# Patient Record
Sex: FEMALE | Race: BLACK OR AFRICAN AMERICAN | NOT HISPANIC OR LATINO | Employment: OTHER | URBAN - METROPOLITAN AREA
[De-identification: names, ages, dates, MRNs, and addresses within clinical notes are randomized per-mention and may not be internally consistent; named-entity substitution may affect disease eponyms.]

---

## 2018-01-18 RX ORDER — FENOFIBRATE 48 MG/1
48 TABLET, FILM COATED ORAL DAILY
COMMUNITY
Start: 2017-10-19 | End: 2018-04-17 | Stop reason: SDUPTHER

## 2018-01-18 RX ORDER — AMLODIPINE BESYLATE 5 MG/1
5 TABLET ORAL DAILY
COMMUNITY
Start: 2017-12-02 | End: 2018-04-17 | Stop reason: SDUPTHER

## 2018-01-18 RX ORDER — CARVEDILOL 3.12 MG/1
3.12 TABLET ORAL DAILY
COMMUNITY
Start: 2017-12-02

## 2018-01-18 RX ORDER — ATORVASTATIN CALCIUM 20 MG/1
20 TABLET, FILM COATED ORAL DAILY
COMMUNITY
Start: 2017-12-02 | End: 2018-04-17 | Stop reason: SDUPTHER

## 2018-01-18 RX ORDER — CARVEDILOL 6.25 MG/1
6.25 TABLET ORAL DAILY
COMMUNITY
Start: 2017-12-02 | End: 2018-04-17 | Stop reason: SDUPTHER

## 2018-01-18 RX ORDER — LISINOPRIL AND HYDROCHLOROTHIAZIDE 12.5; 2 MG/1; MG/1
1 TABLET ORAL DAILY
COMMUNITY
Start: 2017-12-02 | End: 2018-04-17 | Stop reason: SDUPTHER

## 2018-04-17 ENCOUNTER — OFFICE VISIT (OUTPATIENT)
Dept: GASTROENTEROLOGY | Facility: CLINIC | Age: 53
End: 2018-04-17
Payer: MEDICARE

## 2018-04-17 VITALS
HEIGHT: 64 IN | BODY MASS INDEX: 20.93 KG/M2 | RESPIRATION RATE: 16 BRPM | SYSTOLIC BLOOD PRESSURE: 148 MMHG | DIASTOLIC BLOOD PRESSURE: 88 MMHG | WEIGHT: 122.63 LBS | HEART RATE: 84 BPM | TEMPERATURE: 98 F

## 2018-04-17 DIAGNOSIS — K70.30 ALCOHOLIC CIRRHOSIS OF LIVER WITHOUT ASCITES: Primary | ICD-10-CM

## 2018-04-17 DIAGNOSIS — M54.40 ACUTE RIGHT-SIDED LOW BACK PAIN WITH SCIATICA, SCIATICA LATERALITY UNSPECIFIED: ICD-10-CM

## 2018-04-17 DIAGNOSIS — I10 ESSENTIAL HYPERTENSION: ICD-10-CM

## 2018-04-17 DIAGNOSIS — K85.20 ALCOHOL-INDUCED ACUTE PANCREATITIS, UNSPECIFIED COMPLICATION STATUS: ICD-10-CM

## 2018-04-17 DIAGNOSIS — K25.9 GASTRIC ULCER WITHOUT HEMORRHAGE OR PERFORATION, UNSPECIFIED CHRONICITY: ICD-10-CM

## 2018-04-17 PROCEDURE — 99215 OFFICE O/P EST HI 40 MIN: CPT | Mod: ,,, | Performed by: INTERNAL MEDICINE

## 2018-04-17 RX ORDER — POTASSIUM CHLORIDE 20 MEQ/1
1 TABLET, EXTENDED RELEASE ORAL DAILY
COMMUNITY
Start: 2018-03-04 | End: 2018-04-17 | Stop reason: SDUPTHER

## 2018-04-17 RX ORDER — LISINOPRIL AND HYDROCHLOROTHIAZIDE 12.5; 2 MG/1; MG/1
1 TABLET ORAL DAILY
Qty: 90 TABLET | Refills: 0 | Status: SHIPPED | OUTPATIENT
Start: 2018-04-17 | End: 2019-08-15

## 2018-04-17 RX ORDER — FENOFIBRATE 48 MG/1
48 TABLET, FILM COATED ORAL DAILY
Qty: 90 TABLET | Refills: 0 | Status: SHIPPED | OUTPATIENT
Start: 2018-04-17

## 2018-04-17 RX ORDER — CARVEDILOL 6.25 MG/1
6.25 TABLET ORAL DAILY
Qty: 90 TABLET | Refills: 0 | Status: SHIPPED | OUTPATIENT
Start: 2018-04-17 | End: 2018-12-27 | Stop reason: SDUPTHER

## 2018-04-17 RX ORDER — ATORVASTATIN CALCIUM 20 MG/1
20 TABLET, FILM COATED ORAL DAILY
Qty: 90 TABLET | Refills: 0 | Status: SHIPPED | OUTPATIENT
Start: 2018-04-17 | End: 2018-12-27 | Stop reason: SDUPTHER

## 2018-04-17 RX ORDER — LANOLIN ALCOHOL/MO/W.PET/CERES
1 CREAM (GRAM) TOPICAL DAILY
Refills: 3 | COMMUNITY
Start: 2018-03-04

## 2018-04-17 RX ORDER — POTASSIUM CHLORIDE 20 MEQ/1
20 TABLET, EXTENDED RELEASE ORAL DAILY
Qty: 90 TABLET | Refills: 0 | Status: SHIPPED | OUTPATIENT
Start: 2018-04-17 | End: 2019-08-15

## 2018-04-17 RX ORDER — AMLODIPINE BESYLATE 5 MG/1
5 TABLET ORAL DAILY
Qty: 90 TABLET | Refills: 0 | Status: SHIPPED | OUTPATIENT
Start: 2018-04-17

## 2018-04-23 ENCOUNTER — TELEPHONE (OUTPATIENT)
Dept: GASTROENTEROLOGY | Facility: CLINIC | Age: 53
End: 2018-04-23

## 2018-04-23 NOTE — TELEPHONE ENCOUNTER
Patient called to speak with Dr. Vanessa to have a form filled out to get a handicap sticker because of her hip and back. Please call patient.

## 2018-06-17 NOTE — PROGRESS NOTES
[facility] Established Patient Visit    Subjective:           PCP: Chrissy Vanessa    Chief Complaint: Follow-up and Medication Refill       HPI:  Fariba Gamez is a 52 y.o. female here for follow up and medication refill. Patient has hx of cirrhosis and continues to drink alcohol. Patient has hx of pancreatitis, and htn.       ROS:   Constitutional: No fevers, chills, weight loss  ENT: No allergies, sore throat, rhinorrhea  CV: No chest pain, palpitations, edema  Pulm: No cough, shortness of breath, wheezing  Ophtho: No vision changes  GI: No blood in stools, change in bowel habits, nausea/vomiting  Denies alternating diarrhea/constipation.   Derm: No rash  Heme: No easy bruising or lymphadenopathy  MSK: No arthralgias or myalgias  : No dysuria, hematuria, frequency, polyuria, or flank tenderness  Endo: No hot or cold intolerance  Neuro: No syncope or seizure, or dizziness  Psych: No hallucination, depression or suicidal ideation      Medical History:  has a past medical history of Alcohol induced acute pancreatitis; Alcoholic cirrhosis of liver without ascites; Alcoholic liver disease; Gastric ulcer, unspecified as acute or chronic, without hemorrhage or perforation; Hyperlipidemia; Hypertension; Left-sided low back pain with left-sided sciatica; Pancreatitis; Stomach ulcer; and Unspecified cirrhosis of liver.      Surgical History:  has no past surgical history on file.    Family History:   Family History   Problem Relation Age of Onset    Hypertension Mother     Kidney failure Mother     Hypertension Father     Heart attack Father        Social History:   Social History   Substance Use Topics    Smoking status: Never Smoker    Smokeless tobacco: Never Used    Alcohol use Yes      Comment: socially       The patient's social and family histories were reviewed by me and updated in the appropriate section of the electronic medical record.    Allergies: Review of patient's allergies  "indicates:  No Known Allergies      Medications:   Current Outpatient Prescriptions   Medication Sig Dispense Refill    amLODIPine (NORVASC) 5 MG tablet Take 1 tablet (5 mg total) by mouth Daily. 90 tablet 0    atorvastatin (LIPITOR) 20 MG tablet Take 1 tablet (20 mg total) by mouth Daily. 90 tablet 0    carvedilol (COREG) 3.125 MG tablet Take 3.125 mg by mouth Daily.      carvedilol (COREG) 6.25 MG tablet Take 1 tablet (6.25 mg total) by mouth Daily. 90 tablet 0    fenofibrate (TRICOR) 48 MG tablet Take 1 tablet (48 mg total) by mouth Daily. 90 tablet 0    lisinopril-hydrochlorothiazide (PRINZIDE,ZESTORETIC) 20-12.5 mg per tablet Take 1 tablet by mouth Daily. 90 tablet 0    magnesium oxide (MAG-OX) 400 mg tablet Take 1 tablet by mouth once daily.  3    potassium chloride SA (K-DUR,KLOR-CON) 20 MEQ tablet Take 1 tablet (20 mEq total) by mouth once daily. 90 tablet 0     No current facility-administered medications for this visit.      All medications and past history have been reviewed.        Objective:        Vital Signs:  Blood pressure (!) 148/88, pulse 84, temperature 97.9 °F (36.6 °C), resp. rate 16, height 5' 4" (1.626 m), weight 55.6 kg (122 lb 9.6 oz). Body mass index is 21.04 kg/m².    Physical Exam:   General Appearance: Well appearing in no acute distress, well developed, well                nourished  Head: Normocephalic, without obvious abnormality  Eyes:  Pupils equal, round and reactive to light  Throat: Lips, mucosa, and tongue normal; teeth and gums normal  Lungs: CTA bilaterally in anterior and posterior fields, no wheezes, no crackles  Heart:  Regular rate and rhythm, no murmurs heard  Abdomen: Soft, non tender, non distended with positive bowel sounds in all four quadrants. No hepatosplenomegaly, ascites, or mass. Negative for succusion splash  : female   Extremities: No cyanosis, edema or deformity  Skin: No rash  Neurologic: Normal exam    Labs:  Lab Results   Component Value Date    " WBC 5.90 04/09/2010    HGB 9.1 (L) 04/09/2010    HCT 28.8 (L) 04/09/2010     04/09/2010    ALT 12 04/09/2010    AST 22 04/09/2010     04/09/2010    K 3.3 (L) 04/09/2010     04/09/2010    CREATININE 0.7 04/09/2010    BUN 7 04/09/2010    CO2 19 (L) 04/09/2010       Imaging:     All lab results and imaging results have been reviewed and discussed with the patient      Assessment:       1. Alcoholic cirrhosis of liver without ascites    2. Alcohol-induced acute pancreatitis, unspecified complication status    3. Gastric ulcer without hemorrhage or perforation, unspecified chronicity    4. Essential hypertension    5. Acute right-sided low back pain with sciatica, sciatica laterality unspecified        Plan:       Fariba was seen today for follow-up and medication refill.    Diagnoses and all orders for this visit:    Alcoholic cirrhosis of liver without ascites  -     potassium chloride SA (K-DUR,KLOR-CON) 20 MEQ tablet; Take 1 tablet (20 mEq total) by mouth once daily.  -     lisinopril-hydrochlorothiazide (PRINZIDE,ZESTORETIC) 20-12.5 mg per tablet; Take 1 tablet by mouth Daily.  -     fenofibrate (TRICOR) 48 MG tablet; Take 1 tablet (48 mg total) by mouth Daily.  -     carvedilol (COREG) 6.25 MG tablet; Take 1 tablet (6.25 mg total) by mouth Daily.  -     atorvastatin (LIPITOR) 20 MG tablet; Take 1 tablet (20 mg total) by mouth Daily.  -     amLODIPine (NORVASC) 5 MG tablet; Take 1 tablet (5 mg total) by mouth Daily.    Alcohol-induced acute pancreatitis, unspecified complication status  -     potassium chloride SA (K-DUR,KLOR-CON) 20 MEQ tablet; Take 1 tablet (20 mEq total) by mouth once daily.  -     lisinopril-hydrochlorothiazide (PRINZIDE,ZESTORETIC) 20-12.5 mg per tablet; Take 1 tablet by mouth Daily.  -     fenofibrate (TRICOR) 48 MG tablet; Take 1 tablet (48 mg total) by mouth Daily.  -     carvedilol (COREG) 6.25 MG tablet; Take 1 tablet (6.25 mg total) by mouth Daily.  -     atorvastatin  (LIPITOR) 20 MG tablet; Take 1 tablet (20 mg total) by mouth Daily.  -     amLODIPine (NORVASC) 5 MG tablet; Take 1 tablet (5 mg total) by mouth Daily.    Gastric ulcer without hemorrhage or perforation, unspecified chronicity  -     potassium chloride SA (K-DUR,KLOR-CON) 20 MEQ tablet; Take 1 tablet (20 mEq total) by mouth once daily.  -     lisinopril-hydrochlorothiazide (PRINZIDE,ZESTORETIC) 20-12.5 mg per tablet; Take 1 tablet by mouth Daily.  -     fenofibrate (TRICOR) 48 MG tablet; Take 1 tablet (48 mg total) by mouth Daily.  -     carvedilol (COREG) 6.25 MG tablet; Take 1 tablet (6.25 mg total) by mouth Daily.  -     atorvastatin (LIPITOR) 20 MG tablet; Take 1 tablet (20 mg total) by mouth Daily.  -     amLODIPine (NORVASC) 5 MG tablet; Take 1 tablet (5 mg total) by mouth Daily.    Essential hypertension  -     potassium chloride SA (K-DUR,KLOR-CON) 20 MEQ tablet; Take 1 tablet (20 mEq total) by mouth once daily.  -     lisinopril-hydrochlorothiazide (PRINZIDE,ZESTORETIC) 20-12.5 mg per tablet; Take 1 tablet by mouth Daily.  -     fenofibrate (TRICOR) 48 MG tablet; Take 1 tablet (48 mg total) by mouth Daily.  -     carvedilol (COREG) 6.25 MG tablet; Take 1 tablet (6.25 mg total) by mouth Daily.  -     atorvastatin (LIPITOR) 20 MG tablet; Take 1 tablet (20 mg total) by mouth Daily.  -     amLODIPine (NORVASC) 5 MG tablet; Take 1 tablet (5 mg total) by mouth Daily.    Acute right-sided low back pain with sciatica, sciatica laterality unspecified  -     potassium chloride SA (K-DUR,KLOR-CON) 20 MEQ tablet; Take 1 tablet (20 mEq total) by mouth once daily.  -     lisinopril-hydrochlorothiazide (PRINZIDE,ZESTORETIC) 20-12.5 mg per tablet; Take 1 tablet by mouth Daily.  -     fenofibrate (TRICOR) 48 MG tablet; Take 1 tablet (48 mg total) by mouth Daily.  -     carvedilol (COREG) 6.25 MG tablet; Take 1 tablet (6.25 mg total) by mouth Daily.  -     atorvastatin (LIPITOR) 20 MG tablet; Take 1 tablet (20 mg total) by  mouth Daily.  -     amLODIPine (NORVASC) 5 MG tablet; Take 1 tablet (5 mg total) by mouth Daily.        See HPI    Follow-up in about 3 months (around 7/17/2018).    The plan was discussed with the patient and all questions/concerns have been answered to the patient's satisfaction.        Electronically signed by Chrissy Vanessa MD    This note was dictated using voice recognition software and may contain grammatical errors.

## 2018-07-25 LAB
CANCER AG125 SERPL-ACNC: 7.2 U/ML (ref 0–38.1)
CANCER AG19-9 SERPL-ACNC: 67 U/ML (ref 0–35)

## 2018-07-27 LAB
ALBUMIN SERPL-MCNC: 3.2 G/DL (ref 3.1–4.7)
ALP SERPL-CCNC: 46 IU/L (ref 40–104)
ALT (SGPT): 10 IU/L (ref 3–33)
AMYLASE SERPL-CCNC: 54 U/L (ref 28–100)
APTT PPP: 32.6 SEC (ref 21.7–37.8)
AST SERPL-CCNC: 16 IU/L (ref 10–40)
BILIRUB SERPL-MCNC: 0.8 MG/DL (ref 0.3–1)
BUN SERPL-MCNC: <5 MG/DL (ref 8–20)
CALCIUM SERPL-MCNC: 8.9 MG/DL (ref 7.7–10.4)
CHLORIDE: 105 MMOL/L (ref 98–110)
CO2 SERPL-SCNC: 21.8 MMOL/L (ref 22.8–31.6)
CREATININE: 0.48 MG/DL (ref 0.6–1.4)
GLUCOSE: 87 MG/DL (ref 70–99)
HCT VFR BLD AUTO: 28 % (ref 36–48)
HGB BLD-MCNC: 10 G/DL (ref 12–15)
INR PPP: 1.1
MAGNESIUM SERPL-MCNC: 1.3 MG/DL (ref 1.5–2.6)
MCH RBC QN AUTO: 34.8 PG (ref 25–35)
MCHC RBC AUTO-ENTMCNC: 35.7 G/DL (ref 31–36)
MCV RBC AUTO: 97.6 FL (ref 79–98)
NUCLEATED RBCS: 0 %
PLATELET # BLD AUTO: 145 K/UL (ref 140–440)
POTASSIUM SERPL-SCNC: 3.6 MMOL/L (ref 3.5–5)
PROT SERPL-MCNC: 6 G/DL (ref 6–8.2)
PROTHROMBIN TIME: 14.4 SEC (ref 11.3–15.2)
RBC # BLD AUTO: 2.87 M/UL (ref 3.5–5.5)
SODIUM: 136 MMOL/L (ref 134–144)
WBC # BLD AUTO: 4.5 K/UL (ref 5–10)

## 2018-07-28 LAB
ALBUMIN SERPL-MCNC: 3.1 G/DL (ref 3.1–4.7)
ALP SERPL-CCNC: 42 IU/L (ref 40–104)
ALT (SGPT): 10 IU/L (ref 3–33)
AMYLASE SERPL-CCNC: 46 U/L (ref 28–100)
AST SERPL-CCNC: 13 IU/L (ref 10–40)
BILIRUB SERPL-MCNC: 0.9 MG/DL (ref 0.3–1)
BUN SERPL-MCNC: <5 MG/DL (ref 8–20)
CALCIUM SERPL-MCNC: 8.4 MG/DL (ref 7.7–10.4)
CHLORIDE: 104 MMOL/L (ref 98–110)
CO2 SERPL-SCNC: 21.6 MMOL/L (ref 22.8–31.6)
CREATININE: 0.51 MG/DL (ref 0.6–1.4)
GLUCOSE: 101 MG/DL (ref 70–99)
HCT VFR BLD AUTO: 26.1 % (ref 36–48)
HGB BLD-MCNC: 9.6 G/DL (ref 12–15)
LIPASE SERPL-CCNC: 63 U/L (ref 0–38)
MAGNESIUM SERPL-MCNC: 2 MG/DL (ref 1.5–2.6)
MCH RBC QN AUTO: 35.6 PG (ref 25–35)
MCHC RBC AUTO-ENTMCNC: 36.8 G/DL (ref 31–36)
MCV RBC AUTO: 96.7 FL (ref 79–98)
NUCLEATED RBCS: 0 %
PHOSPHATE FLD-MCNC: 3.5 MG/DL (ref 2.5–4.9)
PLATELET # BLD AUTO: 135 K/UL (ref 140–440)
POTASSIUM SERPL-SCNC: 3.2 MMOL/L (ref 3.5–5)
PROT SERPL-MCNC: 5.5 G/DL (ref 6–8.2)
RBC # BLD AUTO: 2.7 M/UL (ref 3.5–5.5)
SODIUM: 134 MMOL/L (ref 134–144)
WBC # BLD AUTO: 3.6 K/UL (ref 5–10)

## 2018-07-31 ENCOUNTER — TELEPHONE (OUTPATIENT)
Dept: GASTROENTEROLOGY | Facility: CLINIC | Age: 53
End: 2018-07-31

## 2018-07-31 NOTE — TELEPHONE ENCOUNTER
Patient has some question about pain meds and wants to know if she can start a eating mash potatoes. She was sent home from the hospital on clears.

## 2018-09-12 ENCOUNTER — OFFICE VISIT (OUTPATIENT)
Dept: HEMATOLOGY/ONCOLOGY | Facility: CLINIC | Age: 53
End: 2018-09-12
Payer: MEDICARE

## 2018-09-12 VITALS
HEART RATE: 75 BPM | BODY MASS INDEX: 21.2 KG/M2 | DIASTOLIC BLOOD PRESSURE: 82 MMHG | SYSTOLIC BLOOD PRESSURE: 124 MMHG | WEIGHT: 123.5 LBS | RESPIRATION RATE: 18 BRPM | TEMPERATURE: 99 F

## 2018-09-12 DIAGNOSIS — D69.6 THROMBOCYTOPENIA: ICD-10-CM

## 2018-09-12 DIAGNOSIS — K86.89 PANCREATIC MASS: ICD-10-CM

## 2018-09-12 DIAGNOSIS — D72.819 LEUKOPENIA, UNSPECIFIED TYPE: ICD-10-CM

## 2018-09-12 DIAGNOSIS — C22.7 OTHER SPECIFIED CARCINOMAS OF LIVER: ICD-10-CM

## 2018-09-12 DIAGNOSIS — D64.9 NORMOCHROMIC NORMOCYTIC ANEMIA: ICD-10-CM

## 2018-09-12 DIAGNOSIS — C25.0 MALIGNANT NEOPLASM OF HEAD OF PANCREAS: ICD-10-CM

## 2018-09-12 PROBLEM — C25.9 PANCREATIC CANCER: Status: ACTIVE | Noted: 2018-09-12

## 2018-09-12 PROCEDURE — 99204 OFFICE O/P NEW MOD 45 MIN: CPT | Mod: ,,, | Performed by: INTERNAL MEDICINE

## 2018-09-12 RX ORDER — SUCRALFATE 1 G/10ML
SUSPENSION ORAL
Refills: 0 | COMMUNITY
Start: 2018-07-28 | End: 2019-08-15

## 2018-09-12 RX ORDER — PANCRELIPASE 60000; 12000; 38000 [USP'U]/1; [USP'U]/1; [USP'U]/1
1 CAPSULE, DELAYED RELEASE PELLETS ORAL
Refills: 3 | COMMUNITY
Start: 2018-08-16

## 2018-09-12 RX ORDER — HYDRALAZINE HYDROCHLORIDE 50 MG/1
50 TABLET, FILM COATED ORAL EVERY 12 HOURS
Refills: 0 | COMMUNITY
Start: 2018-07-28

## 2018-09-12 RX ORDER — PANTOPRAZOLE SODIUM 40 MG/1
40 TABLET, DELAYED RELEASE ORAL DAILY
Refills: 0 | COMMUNITY
Start: 2018-07-28

## 2018-09-12 NOTE — LETTER
September 12, 2018      Arnel Bettencourt Jr., MD  [address] [place] [address]           [facility] - Hematology Oncology  [address]  [address]  [place] [address]  Phone: [phone]  Fax: [phone]          Patient: Fariba Gamez   MR Number: [id number]   YOB: 1965   Date of Visit: 9/12/2018       Dear Dr. Arnel Bettencourt Jr.:    Thank you for referring Fariba Gamez to me for evaluation. Attached you will find relevant portions of my assessment and plan of care.    If you have questions, please do not hesitate to call me. I look forward to following Fariba Gamez along with you.    Sincerely,    Manjinder Christy MD    Enclosure  CC:  No Recipients    If you would like to receive this communication electronically, please contact [email or web address] or [phone] to request more information on [employer] access.    For providers and/or their staff who would like to refer a patient to [facility], please contact us through our one-stop-shop provider referral line, [facility], at [phone].    If you feel you have received this communication in error or would no longer like to receive these types of communications, please e-mail [email or web address]

## 2018-09-12 NOTE — PROGRESS NOTES
"[facility] Hematolgy/Oncology  History & Physical    Subjective:      Patient ID:   NAME: Fariba Gamez : 1965     52 y.o. female    Referring Doc: Arnel Bettencourt Jr.,*  Other Physicians: Eugenie Vanessa        Chief Complaint: pancreatic mass    HPI:  52 y.o. female with diagnosis of pancreatic mass who has been referred by Arnel Bettencourt Jr.,* for evaluation by medical hematology/oncology. She is a patient of Dr Vanessa with GI. She has history of alcoholic liver disease, pancreatitis and cirrhosis. She had recent imaging which showed a mass in the head of her pancreas. She has not had any diagnostic biopsy or other tests done as of yet. She feels "good" and has not had any symptoms of discomfort. She breathing ok. She denies any CP, SOB, HA's or N/V. She has some occasional bilateral edema. She has lost weight but has since gained it back.               ROS:   GEN: normal without any fever, night sweats or weight loss  HEENT: normal with no HA's, sore throat, stiff neck, changes in vision  CV: normal with no CP, SOB, PND, MARTIN or orthopnea  PULM: normal with no SOB, cough, hemoptysis, sputum or pleuritic pain  GI: normal with no abdominal pain, nausea, vomiting, constipation, diarrhea, melanotic stools, BRBPR, or hematemesis  : normal with no hematuria, dysuria  BREAST: normal with no mass, discharge, pain  SKIN: normal with no rash, erythema, bruising, or swelling       Past Medical/Surgical History:  Past Medical History:   Diagnosis Date    Alcohol induced acute pancreatitis     Alcoholic cirrhosis of liver without ascites     Alcoholic liver disease     Gastric ulcer, unspecified as acute or chronic, without hemorrhage or perforation     Hyperlipidemia     Hypertension     Left-sided low back pain with left-sided sciatica     Leucopenia 2018    Normochromic normocytic anemia 2018    Pancreatic cancer 2018    Pancreatic mass 2018    Pancreatitis     Stomach " ulcer     Thrombocytopenia 9/12/2018    Unspecified cirrhosis of liver      History reviewed. No pertinent surgical history.      Allergies:  Review of patient's allergies indicates:  No Known Allergies    Social/Family History:  Social History     Socioeconomic History    Marital status: Single     Spouse name: Not on file    Number of children: Not on file    Years of education: Not on file    Highest education level: Not on file   Social Needs    Financial resource strain: Not on file    Food insecurity - worry: Not on file    Food insecurity - inability: Not on file    Transportation needs - medical: Not on file    Transportation needs - non-medical: Not on file   Occupational History    Not on file   Tobacco Use    Smoking status: Never Smoker    Smokeless tobacco: Never Used   Substance and Sexual Activity    Alcohol use: Yes     Comment: socially    Drug use: No    Sexual activity: Not on file   Other Topics Concern    Not on file   Social History Narrative    Not on file     Family History   Problem Relation Age of Onset    Hypertension Mother     Kidney failure Mother     Hypertension Father     Heart attack Father          Medications:    Current Outpatient Medications:     amLODIPine (NORVASC) 5 MG tablet, Take 1 tablet (5 mg total) by mouth Daily., Disp: 90 tablet, Rfl: 0    atorvastatin (LIPITOR) 20 MG tablet, Take 1 tablet (20 mg total) by mouth Daily., Disp: 90 tablet, Rfl: 0    CARAFATE 100 mg/mL suspension, TK 2 TEA PO BID, Disp: , Rfl: 0    carvedilol (COREG) 3.125 MG tablet, Take 3.125 mg by mouth Daily., Disp: , Rfl:     carvedilol (COREG) 6.25 MG tablet, Take 1 tablet (6.25 mg total) by mouth Daily., Disp: 90 tablet, Rfl: 0    CREON CpDR, TK 1 C PO TID WC, Disp: , Rfl: 3    fenofibrate (TRICOR) 48 MG tablet, Take 1 tablet (48 mg total) by mouth Daily., Disp: 90 tablet, Rfl: 0    hydrALAZINE (APRESOLINE) 50 MG tablet, TK 1 T PO Q 12 H, Disp: , Rfl: 0     lisinopril-hydrochlorothiazide (PRINZIDE,ZESTORETIC) 20-12.5 mg per tablet, Take 1 tablet by mouth Daily., Disp: 90 tablet, Rfl: 0    magnesium oxide (MAG-OX) 400 mg tablet, Take 1 tablet by mouth once daily., Disp: , Rfl: 3    pantoprazole (PROTONIX) 40 MG tablet, TK 1 T PO QD, Disp: , Rfl: 0    potassium chloride SA (K-DUR,KLOR-CON) 20 MEQ tablet, Take 1 tablet (20 mEq total) by mouth once daily., Disp: 90 tablet, Rfl: 0      Pathology:      EGD: 7/27/2018    FINAL DIAGNOSIS:  GASTRIC ANTRUM BIOPSY:    - MODERATE ACTIVE CHRONIC ANTRAL GASTRITIS WITH FOCAL FOLLICULAR      GASTRITIS FEATURES, HELICOBACTER NEGATIVE.    - NO INTESTINAL METAPLASIA, GLAND DYSPLASIA, OR MALIGNANCY SEEN.  Note: The patterns of follicular gastritis are not fully developed and  are only focally seen in one of the level sections.  The Giemsa control  slide is satisfactory.  DUODENUM BIOPSY:    - NO HISTOPATHOLOGIC DIAGNOSIS.  SPECIMEN:  ANTRUM DUODENUM  PRE/POST OPERATIVE DIAGNOSIS: LEFT SIDE ABDOMINAL PAIN / ESOPHAGITIS,  GASTRITIS, DUODENITIS      Objective:   Vitals:  Blood pressure 124/82, pulse 75, temperature 98.5 °F (36.9 °C), resp. rate 18, weight 56 kg (123 lb 8 oz).    Physical Examination:   GEN: no apparent distress, comfortable; AAOx3  HEAD: atraumatic and normocephalic  EYES: no pallor, no icterus, PERRLA  ENT: OMM, no pharyngeal erythema, external ears WNL; no nasal discharge; no thrush  NECK: no masses, thyroid normal, trachea midline, no LAD/LN's, supple  CV: RRR with no murmur; normal pulse; normal S1 and S2; mild pedal edema  CHEST: Normal respiratory effort; CTAB; normal breath sounds; no wheeze or crackles  ABDOM: nontender; soft; normal bowel sounds; no rebound/guarding; mildly distended  MUSC/Skeletal: ROM normal; no crepitus; joints normal; no deformities or arthropathy  EXTREM: no clubbing, cyanosis, inflammation or swelling; mild edema (pedal) bilat  SKIN: no rashes, lesions, ulcers, petechiae or subcutaneous  nodules  : no peña  NEURO: grossly intact; motor/sensory WNL; AAOx3; no tremors  PSYCH: normal mood, affect and behavior  LYMPH: normal cervical, supraclavicular, axillary and groin LN's      Labs:     8/5/2018 on chart      7/28/2018  Lab Results   Component Value Date    WBC 3.6 (L) 07/28/2018    HGB 9.6 (L) 07/28/2018    HCT 26.1 (L) 07/28/2018    MCV 96.7 07/28/2018     (L) 07/28/2018    CMP  Sodium   Date Value Ref Range Status   07/28/2018 134 134 - 144 mmol/L      Potassium   Date Value Ref Range Status   07/28/2018 3.2 (L) 3.5 - 5.0 mmol/L      Chloride   Date Value Ref Range Status   07/28/2018 104 98 - 110 mmol/L      CO2   Date Value Ref Range Status   07/28/2018 21.6 (L) 22.8 - 31.6 mmol/L      Glucose   Date Value Ref Range Status   07/28/2018 101 (H) 70 - 99 mg/dL      BUN, Bld   Date Value Ref Range Status   07/28/2018 <5 (L) 8 - 20 mg/dL      Creatinine   Date Value Ref Range Status   07/28/2018 0.51 (L) 0.60 - 1.40 mg/dL      Calcium   Date Value Ref Range Status   07/28/2018 8.4 7.7 - 10.4 mg/dL      Total Protein   Date Value Ref Range Status   07/28/2018 5.5 (L) 6.0 - 8.2 g/dL      Albumin   Date Value Ref Range Status   07/28/2018 3.1 3.1 - 4.7 g/dL      Total Bilirubin   Date Value Ref Range Status   07/28/2018 0.9 0.3 - 1.0 mg/dL      Alkaline Phosphatase   Date Value Ref Range Status   07/28/2018 42 40 - 104 IU/L      AST   Date Value Ref Range Status   07/28/2018 13 10 - 40 IU/L      ALT   Date Value Ref Range Status   04/09/2010 12 10 - 44 U/L Final     Anion Gap   Date Value Ref Range Status   04/09/2010 16 10 - 20 mmol/L Final     eGFR if    Date Value Ref Range Status   04/09/2010 >60 >60 mL/min Final     Comment:     Estimated glomerular filtration rate (eGFR) is normalized to an  average body surface area of 1.73 square meters.  The calculation  used to obtain the eGFR is the adjusted MDRD equation, which factors  patient sex, age, race, and creatinine result.   Since race is unknown  in our information system, the eGFR values for -American  and Non--American patients are given for each creatinine  result.       eGFR if non    Date Value Ref Range Status   04/09/2010 >60 >60 mL/min Final         Radiology/Diagnostic Studies:    Abdom US 8/5/2018:    Mass in head of pancreas 2.5 cm in size      All lab results and imaging results have been reviewed and discussed with the patient    Assessment:   (1) 52 y.o. female with diagnosis of pancreatic mass in head of pancrease seen on Ultrasonography on 8/5  - she is followed by Dr Vanessa with GI  - she has not had any diagnostic procedure or biopsy    (2) Alcoholic liver disease/cirrhosis and prior alcoholic pancreatitis  - she has been sober for 3 months    (3) HTN and hypercholesterolemia    (4) prior stomach ulcers    (5) NCNC anemia, leucopenia and mild thrombocytopenia - most likely related to her liver disease            Plan:       Malignant neoplasm of head of pancreas    Normochromic normocytic anemia    Thrombocytopenia    Leukopenia, unspecified type    Pancreatic mass            PLAN:  1. Refer to Dr Corrigan with GI for evaluation for ERCP for diagnostic biopsy  2. Refer to Dr Lalo Enriquez at [facility]  3. Check CEA,  and AFP  4. F/u with PCP, Card, etc  RTC in  2-3 weeks   Fax note to Arnel Bettencourt Jr.,* Mina Vanessa [place], [place]        I have explained and the patient understands all of  the current recommendation(s). I have answered all of their questions to the best of my ability and to their complete satisfaction.             Thank you for allowing me to participate in this patient's care. Please call with any questions or concerns.    Electronically signed Manjinder Christy MD

## 2018-09-13 ENCOUNTER — TELEPHONE (OUTPATIENT)
Dept: SURGERY | Facility: CLINIC | Age: 53
End: 2018-09-13

## 2018-09-13 DIAGNOSIS — K86.89 PANCREATIC MASS: Primary | ICD-10-CM

## 2018-09-20 ENCOUNTER — TELEPHONE (OUTPATIENT)
Dept: SURGERY | Facility: CLINIC | Age: 53
End: 2018-09-20

## 2018-09-20 NOTE — TELEPHONE ENCOUNTER
----- Message from Landy Lau sent at 9/20/2018 12:10 PM CDT -----  Contact: Pt.Self   Pt. States she would like to speak with nurse in reference to getting confirmation for CT Scan time due to seeing different times and is confused please call Pt. Back @ [phone] Thank You

## 2018-09-25 ENCOUNTER — TELEPHONE (OUTPATIENT)
Dept: SURGERY | Facility: CLINIC | Age: 53
End: 2018-09-25

## 2018-09-25 NOTE — TELEPHONE ENCOUNTER
----- Message from Landy Lau sent at 9/25/2018  2:03 PM CDT -----  Contact: Pt.Self   Pt. States she is returning call from nurse  please call Pt. Back @ [phone] Thank You

## 2018-09-25 NOTE — TELEPHONE ENCOUNTER
----- Message from Shahab Isabel sent at 9/25/2018 11:16 AM CDT -----  Pt missed her appt on yesterday and needs to reschedule it. Please call pt regarding this at [phone]

## 2018-09-26 ENCOUNTER — INITIAL CONSULT (OUTPATIENT)
Dept: SURGERY | Facility: CLINIC | Age: 53
End: 2018-09-26
Payer: MEDICARE

## 2018-09-26 ENCOUNTER — HOSPITAL ENCOUNTER (OUTPATIENT)
Dept: RADIOLOGY | Facility: HOSPITAL | Age: 53
Discharge: HOME | End: 2018-09-26
Attending: SURGERY
Payer: MEDICARE

## 2018-09-26 VITALS
WEIGHT: 123.69 LBS | DIASTOLIC BLOOD PRESSURE: 93 MMHG | SYSTOLIC BLOOD PRESSURE: 183 MMHG | HEART RATE: 66 BPM | HEIGHT: 64 IN | BODY MASS INDEX: 21.11 KG/M2

## 2018-09-26 DIAGNOSIS — K86.1 CHRONIC CALCIFIC PANCREATITIS: ICD-10-CM

## 2018-09-26 DIAGNOSIS — K86.89 PANCREATIC MASS: ICD-10-CM

## 2018-09-26 PROBLEM — C25.0 MALIGNANT NEOPLASM OF HEAD OF PANCREAS: Status: RESOLVED | Noted: 2018-09-12 | Resolved: 2018-09-26

## 2018-09-26 PROCEDURE — 74177 CT ABD & PELVIS W/CONTRAST: CPT | Mod: TC

## 2018-09-26 PROCEDURE — 74177 CT ABD & PELVIS W/CONTRAST: CPT | Mod: 26,,, | Performed by: RADIOLOGY

## 2018-09-26 PROCEDURE — 71260 CT THORAX DX C+: CPT | Mod: 26,,, | Performed by: RADIOLOGY

## 2018-09-26 PROCEDURE — 25500020 PHARM REV CODE 255: Performed by: SURGERY

## 2018-09-26 PROCEDURE — 99213 OFFICE O/P EST LOW 20 MIN: CPT | Mod: PBBFAC,25 | Performed by: NURSE PRACTITIONER

## 2018-09-26 PROCEDURE — 99205 OFFICE O/P NEW HI 60 MIN: CPT | Mod: S$PBB,,, | Performed by: NURSE PRACTITIONER

## 2018-09-26 PROCEDURE — 99999 PR PBB SHADOW E&M-EST. PATIENT-LVL III: CPT | Mod: PBBFAC,,, | Performed by: NURSE PRACTITIONER

## 2018-09-26 RX ADMIN — IOHEXOL 75 ML: 350 INJECTION, SOLUTION INTRAVENOUS at 10:09

## 2018-09-26 NOTE — PROGRESS NOTES
History & Physical    SUBJECTIVE:     History of Present Illness:  Ms. Gamez is a 52 year old female referred by Dr. Christy for ? Pancreatic mass/? Neoplasm. She also diagnosis of cirrhosis.      She has a hx of chronic alcohol induced pancreatitis with a recent episode of acute pancreatitis.  An US showed a mass in ?panc head.    EUS done by Dr. Cummings on 8/30/16 which showed mild/mod chronic pancreatitis changes thru out entire panc with two 2 cm masses in the HOP which contain calcifications and are suspicious for fibrocalcific chronic pancreatitis.  No specimens were obtained and the plan was to stay on creon and see back in clinic in 6 weeks.     She is feeling quite well, good appetite, no abd pain.  Is gaining weight.  3 months no alcohol.       Chief Complaint   Patient presents with    Consult       Review of patient's allergies indicates:  No Known Allergies    Current Outpatient Medications   Medication Sig Dispense Refill    amLODIPine (NORVASC) 5 MG tablet Take 1 tablet (5 mg total) by mouth Daily. 90 tablet 0    atorvastatin (LIPITOR) 20 MG tablet Take 1 tablet (20 mg total) by mouth Daily. 90 tablet 0    CARAFATE 100 mg/mL suspension TK 2 TEA PO BID  0    carvedilol (COREG) 3.125 MG tablet Take 3.125 mg by mouth Daily.      carvedilol (COREG) 6.25 MG tablet Take 1 tablet (6.25 mg total) by mouth Daily. 90 tablet 0    CREON CpDR TK 1 C PO TID WC  3    fenofibrate (TRICOR) 48 MG tablet Take 1 tablet (48 mg total) by mouth Daily. 90 tablet 0    hydrALAZINE (APRESOLINE) 50 MG tablet TK 1 T PO Q 12 H  0    lisinopril-hydrochlorothiazide (PRINZIDE,ZESTORETIC) 20-12.5 mg per tablet Take 1 tablet by mouth Daily. 90 tablet 0    magnesium oxide (MAG-OX) 400 mg tablet Take 1 tablet by mouth once daily.  3    pantoprazole (PROTONIX) 40 MG tablet TK 1 T PO QD  0    potassium chloride SA (K-DUR,KLOR-CON) 20 MEQ tablet Take 1 tablet (20 mEq total) by mouth once daily. 90 tablet 0     No current  "facility-administered medications for this visit.        Past Medical History:   Diagnosis Date    Alcohol induced acute pancreatitis     Alcoholic cirrhosis of liver without ascites     Alcoholic liver disease     Gastric ulcer, unspecified as acute or chronic, without hemorrhage or perforation     Hyperlipidemia     Hypertension     Left-sided low back pain with left-sided sciatica     Leucopenia 9/12/2018    Normochromic normocytic anemia 9/12/2018    Pancreatic cancer 9/12/2018    Pancreatic mass 9/12/2018    Pancreatitis     Stomach ulcer     Thrombocytopenia 9/12/2018    Unspecified cirrhosis of liver      No past surgical history on file.  Family History   Problem Relation Age of Onset    Hypertension Mother     Kidney failure Mother     Hypertension Father     Heart attack Father      Social History     Tobacco Use    Smoking status: Never Smoker    Smokeless tobacco: Never Used   Substance Use Topics    Alcohol use: Yes     Comment: socially    Drug use: Yes     Frequency: 7.0 times per week     Types: Marijuana     Comment: daily        Review of Systems:  Review of Systems   Constitutional: Negative.    HENT: Negative.    Respiratory: Negative.    Cardiovascular: Negative.    Gastrointestinal: Negative.  Negative for abdominal distention and abdominal pain.   Genitourinary: Negative.    Skin: Negative.    Neurological: Negative.    Psychiatric/Behavioral: Negative.        OBJECTIVE:     Vital Signs (Most Recent)  Pulse: 66 (09/26/18 1121)  BP: (!) 183/93 (09/26/18 1121)  5' 4" (1.626 m)  56.1 kg (123 lb 10.9 oz)     Physical Exam:  Physical Exam   Constitutional: She is oriented to person, place, and time. She appears well-developed and well-nourished.   Eyes: Conjunctivae are normal.   Neck: Normal range of motion.   Cardiovascular: Normal rate.   Pulmonary/Chest: Effort normal.   Musculoskeletal: Normal range of motion.   Neurological: She is alert and oriented to person, place, " and time.   Skin: Skin is dry.   Psychiatric: She has a normal mood and affect.           ASSESSMENT/PLAN:     Fibro calcific pancreatitis.    PLAN:  No role for surgery at present time.  Will refer back to GI for continued pancreatitis, pancreatic mass follow up.

## 2018-09-26 NOTE — PROGRESS NOTES
"Patient seen with Alexsandra Randall NP. Today's CT scan personally reviewed and, overall, is not very impressive. There is a vague, small area of mild hyper-enhancement in the head of the pancreas, but not a definite mass. No upstream PD dilation. She tells me that Dr Cummings told her that her pancreas "looked fine" at EUS. We are trying to obtain this report. Final recs to follow.     Copy Dr Christy  "

## 2018-09-26 NOTE — LETTER
September 26, 2018      Manjinder Christy MD  [address]  [address]  [facility] [address]           [facility]  [address]  [place] [address]  Phone: [phone]          Patient: Fariba Gamez   MR Number: [id number]   YOB: 1965   Date of Visit: 9/26/2018       Dear Dr. Manjinder Christy:    Thank you for referring Fariba Gamez to Dr. Enriquez for evaluation. Attached you will find relevant portions of my assessment and plan of care.    If you have questions, please do not hesitate to call me. I look forward to following Fariba Gamez along with you.    Sincerely,    Delores Randall, NP    Enclosure      If you would like to receive this communication electronically, please contact [email or web address] or [phone] to request more information on [employer] Web Link access.    For providers and/or their staff who would like to refer a patient to [facility], please contact us through our one-stop-shop provider referral line, [facility], at [phone].    If you feel you have received this communication in error or would no longer like to receive these types of communications, please e-mail [email or web address]

## 2018-10-04 ENCOUNTER — OFFICE VISIT (OUTPATIENT)
Dept: HEMATOLOGY/ONCOLOGY | Facility: CLINIC | Age: 53
End: 2018-10-04
Payer: MEDICARE

## 2018-10-04 VITALS
SYSTOLIC BLOOD PRESSURE: 157 MMHG | BODY MASS INDEX: 21.89 KG/M2 | WEIGHT: 127.5 LBS | DIASTOLIC BLOOD PRESSURE: 86 MMHG | TEMPERATURE: 98 F | RESPIRATION RATE: 20 BRPM | HEART RATE: 73 BPM

## 2018-10-04 DIAGNOSIS — D64.9 NORMOCHROMIC NORMOCYTIC ANEMIA: Primary | ICD-10-CM

## 2018-10-04 DIAGNOSIS — D69.6 THROMBOCYTOPENIA: ICD-10-CM

## 2018-10-04 DIAGNOSIS — K86.89 PANCREATIC MASS: ICD-10-CM

## 2018-10-04 DIAGNOSIS — K86.1 CHRONIC CALCIFIC PANCREATITIS: ICD-10-CM

## 2018-10-04 DIAGNOSIS — D72.819 LEUKOPENIA, UNSPECIFIED TYPE: ICD-10-CM

## 2018-10-04 PROCEDURE — 99215 OFFICE O/P EST HI 40 MIN: CPT | Mod: ,,, | Performed by: INTERNAL MEDICINE

## 2018-10-04 NOTE — LETTER
October 4, 2018      Chrissy Vanessa MD  [address]  [address]  [place] [address]           [facility] - Hematology Oncology  [address]  [place] [address]  Phone: [phone]  Fax: [phone]          Patient: Fariba Gamez   MR Number: [id number]   YOB: 1965   Date of Visit: 10/4/2018       Dear Dr. Chrissy Vanessa:    Thank you for referring Fariba Gamez to me for evaluation. Attached you will find relevant portions of my assessment and plan of care.    If you have questions, please do not hesitate to call me. I look forward to following Fariba Gamez along with you.    Sincerely,    Manjinder Christy MD    Enclosure  CC:  No Recipients    If you would like to receive this communication electronically, please contact [email or web address] or [phone] to request more information on [employer] access.    For providers and/or their staff who would like to refer a patient to [facility], please contact us through our one-stop-shop provider referral line, [facility], at [phone].    If you feel you have received this communication in error or would no longer like to receive these types of communications, please e-mail [email or web address]

## 2018-10-04 NOTE — PROGRESS NOTES
[facility] Hematology/Oncology  PROGRESS NOTE - 2nd Follow-up Visit      Subjective:       Patient ID:   NAME: Fariba Gamez : 1965     52 y.o. female    Referring Doc: Rut  Other Physicians: Shekhar Araya Bolton    Chief Complaint:  Pancreatic mass f/u    History of Present Illness:     Patient returns today for a 2nd regularly scheduled follow-up visit.  The patient is here today to go over the results of the recently ordered labs, tests and studies. Patient reports that she has subsequently seen Dr Corrigan/Dr Cummings with GI and she reports that he was unable to identify any pancreatic mass but she had some inflammation. I have no records from Dr Corrigan as of yet. She feels great and has been eating well. She has some mild upper respiratory issues. She denies any CP, SOB, Ha's or N/V. She is here by herself. She is a poor historian. She saw Dr Enriquez on 2018.           ROS:   GEN: normal without any fever, night sweats or weight loss  HEENT: normal with no HA's, sore throat, stiff neck, changes in vision  CV: normal with no CP, SOB, PND, MARTIN or orthopnea  PULM: normal with no SOB, cough, hemoptysis, sputum or pleuritic pain  GI: normal with no abdominal pain, nausea, vomiting, constipation, diarrhea, melanotic stools, BRBPR, or hematemesis  : normal with no hematuria, dysuria  BREAST: normal with no mass, discharge, pain  SKIN: normal with no rash, erythema, bruising, or swelling    Allergies:  Review of patient's allergies indicates:  No Known Allergies    Medications:    Current Outpatient Medications:     amLODIPine (NORVASC) 5 MG tablet, Take 1 tablet (5 mg total) by mouth Daily., Disp: 90 tablet, Rfl: 0    atorvastatin (LIPITOR) 20 MG tablet, Take 1 tablet (20 mg total) by mouth Daily., Disp: 90 tablet, Rfl: 0    CARAFATE 100 mg/mL suspension, TK 2 TEA PO BID, Disp: , Rfl: 0    carvedilol (COREG) 3.125 MG tablet, Take 3.125 mg by mouth Daily., Disp: , Rfl:      carvedilol (COREG) 6.25 MG tablet, Take 1 tablet (6.25 mg total) by mouth Daily., Disp: 90 tablet, Rfl: 0    CREON CpDR, TK 1 C PO TID WC, Disp: , Rfl: 3    fenofibrate (TRICOR) 48 MG tablet, Take 1 tablet (48 mg total) by mouth Daily., Disp: 90 tablet, Rfl: 0    hydrALAZINE (APRESOLINE) 50 MG tablet, TK 1 T PO Q 12 H, Disp: , Rfl: 0    lisinopril-hydrochlorothiazide (PRINZIDE,ZESTORETIC) 20-12.5 mg per tablet, Take 1 tablet by mouth Daily., Disp: 90 tablet, Rfl: 0    magnesium oxide (MAG-OX) 400 mg tablet, Take 1 tablet by mouth once daily., Disp: , Rfl: 3    pantoprazole (PROTONIX) 40 MG tablet, TK 1 T PO QD, Disp: , Rfl: 0    potassium chloride SA (K-DUR,KLOR-CON) 20 MEQ tablet, Take 1 tablet (20 mEq total) by mouth once daily., Disp: 90 tablet, Rfl: 0    PMHx/PSHx Updates:  See patient's last visit with me on 9/12/2018.  See H&P on 9/12/2018        Pathology:  Cancer Staging  No matching staging information was found for the patient.          Objective:     Vitals:  Blood pressure (!) 157/86, pulse 73, temperature 98.1 °F (36.7 °C), resp. rate 20, weight 57.8 kg (127 lb 8 oz).    Physical Examination:   GEN: no apparent distress, comfortable; AAOx3  HEAD: atraumatic and normocephalic  EYES: no pallor, no icterus, PERRLA  ENT: OMM, no pharyngeal erythema, external ears WNL; no nasal discharge; no thrush  NECK: no masses, thyroid normal, trachea midline, no LAD/LN's, supple  CV: RRR with no murmur; normal pulse; normal S1 and S2; mild pedal edema  CHEST: Normal respiratory effort; CTAB; normal breath sounds; no wheeze or crackles  ABDOM: nontender; soft; normal bowel sounds; no rebound/guarding; mildly distended  MUSC/Skeletal: ROM normal; no crepitus; joints normal; no deformities or arthropathy  EXTREM: no clubbing, cyanosis, inflammation or swelling; mild edema (pedal) bilat  SKIN: no rashes, lesions, ulcers, petechiae    or subcutaneous nodules  : no peña  NEURO: grossly intact; motor/sensory WNL;  AAOx3; no tremors  PSYCH: normal mood, affect and behavior  LYMPH: normal cervical, supraclavicular, axillary and groin LN's            Labs:      was 68     7.2    Radiology/Diagnostic Studies:    Ct Chest Abdoment Pelvis With Contrast    Result Date: 9/26/2018  EXAMINATION: CT CHEST ABDOMEN PELVIS WITH CONTRAST (XPD) CLINICAL HISTORY: panc mass; Disease of pancreas, unspecified TECHNIQUE: Low dose axial images, sagittal and coronal reformations were obtained from the thoracic inlet to the pubic symphysis following the IV administration of 75 mL of Omnipaque 350 . No oral contrast was administered.  Pancreatic mass protocol was implemented. COMPARISON: No relevant prior. FINDINGS: There is a heterogeneous exophytic left thyroid nodule measuring 2.0 cm.  Recommend thyroid ultrasound for further evaluation. Heart is normal in size.  Pulmonary arteries distribute normally.  Aorta maintains a normal caliber throughout the thorax and abdomen. No mediastinal lymphadenopathy.  Prominent right hilar lymph node noted. The trachea and proximal airways are patent.  The lungs are equally well expanded.  No large consolidative opacity.  There is minimal platelike atelectasis at the right lung base.  No pleural effusion.  No pneumothorax.  There is a small, 0.4 cm nodule within the lateral segment of the right middle lobe (series 3, image 70). The liver appears normal in size.  No focal hepatic lesions.  Portal vein is patent. There are some small calcified gallstones.  No biliary ductal dilatation.  Spleen, adrenal glands, and stomach are unremarkable. In the expected region of the pancreatic head, there is a poorly marginated, subtle area of relative hyper enhancement measuring 1.3 cm, possibly representing the patient's reported pancreatic head mass.  Remaining pancreatic tissue demonstrates extensive atrophy. Bowel shows no evidence of inflammation or obstruction. Kidneys are normal in size.  There is mild right  pelviectasis.  Visualized portions of the ureters are unremarkable.  There is asymmetric right greater than left urinary bladder wall thickening.  Right adnexal hypodensity likely represents an ovarian cyst. Aorta maintains a normal caliber. Soft tissues are unremarkable.  Osseous structures demonstrate no significant abnormality.     In the expected region of the pancreatic head, there is a poorly marginated, inconspicuous area of lesion like enhancement measuring 1.3 cm, possibly representing the patient's reported pancreatic head mass.  Correlation with EUS is recommended. 0.4 cm nodule within the lateral segment of the right middle lobe.  In a low risk patient, no further follow-up is recommended.  In a high-risk patient (history of smoking and/or malignancy) consider follow-up chest CT in 12 months. Heterogeneous, exophytic left thyroid nodule measuring 2.0 cm.  Thyroid ultrasound is recommended for further evaluation. Mild right pelviectasis and asymmetric right urinary bladder wall thickening. Clinical correlation recommended. Probable right ovarian cyst. This report was flagged in Epic as abnormal. Electronically signed by resident: Iscac Jackson Date:    09/26/2018 Time:    11:21 Electronically signed by: Rambo Childers MD Date:    09/26/2018 Time:    11:54      I have reviewed all available lab results and radiology reports.    Assessment/Plan:   (1) 52 y.o. female with diagnosis of pancreatic mass in head of pancrease seen on Ultrasonography on 8/5  - she is followed by Dr Vanessa with GI  - she saw Dr Corrigan/Dr Cummings per my request, had EUS, and she reports that he was unable to identify any pancreatic mass but she had some inflammation.   - she saw Dr Enriquez with Surg-Onc at [facility] on 9/26/2018 and he is awaiting reports from Dr Cummings     (2) Alcoholic liver disease/cirrhosis and prior alcoholic pancreatitis  - she has been sober for 3-4 months     (3) HTN and hypercholesterolemia     (4) prior  stomach ulcers     (5) NCNC anemia, leucopenia and mild thrombocytopenia - most likely related to her liver disease                   Normochromic normocytic anemia    Thrombocytopenia    Leukopenia, unspecified type    Pancreatic mass    Chronic calcific pancreatitis          PLAN:  1. Need reports from Dr Corrigan/Emiliano  2. She needs to follow-up with Dr Vanessa and her PCP  3. RTC in 3 months  Fax note to Chrissy Vanessa MD, Mina Corrigan,     Discussion:       I spent over 25 mins of time with the patient. Reviewed results of the recently ordered labs, tests and studies; made directives with regards to the results. Over half of this time was spent couseling and coordinating care.    I have explained all of the above in detail and the patient understands all of the current recommendation(s). I have answered all of their questions to the best of my ability and to their complete satisfaction.   The patient is to continue with the current management plan.          ADDENDUM:   When my office called Dr Corrigan's office to get his reports, we were told that the patient has missed two scheduled appointments with him and he has never seen her. We will call Dr Cummings's office to see if she saw them instead. We will also call patient as well.         Electronically signed by Manjinder Christy MD

## 2018-12-04 ENCOUNTER — OFFICE VISIT (OUTPATIENT)
Dept: GASTROENTEROLOGY | Facility: CLINIC | Age: 53
End: 2018-12-04
Payer: MEDICARE

## 2018-12-04 VITALS
SYSTOLIC BLOOD PRESSURE: 124 MMHG | DIASTOLIC BLOOD PRESSURE: 78 MMHG | TEMPERATURE: 98 F | HEART RATE: 76 BPM | HEIGHT: 64 IN | RESPIRATION RATE: 18 BRPM | BODY MASS INDEX: 23.22 KG/M2 | WEIGHT: 136 LBS

## 2018-12-04 DIAGNOSIS — K86.89 PANCREATIC MASS: Primary | ICD-10-CM

## 2018-12-04 DIAGNOSIS — K21.9 GASTROESOPHAGEAL REFLUX DISEASE WITHOUT ESOPHAGITIS: ICD-10-CM

## 2018-12-04 DIAGNOSIS — R10.9 ABDOMINAL PAIN, UNSPECIFIED ABDOMINAL LOCATION: ICD-10-CM

## 2018-12-04 DIAGNOSIS — K70.9 ALCOHOLIC LIVER DISEASE: ICD-10-CM

## 2018-12-04 DIAGNOSIS — K25.9 GASTRIC ULCER WITHOUT HEMORRHAGE OR PERFORATION, UNSPECIFIED CHRONICITY: ICD-10-CM

## 2018-12-04 DIAGNOSIS — K86.1 CHRONIC PANCREATITIS, UNSPECIFIED PANCREATITIS TYPE: ICD-10-CM

## 2018-12-04 PROCEDURE — 99215 OFFICE O/P EST HI 40 MIN: CPT | Mod: ,,, | Performed by: INTERNAL MEDICINE

## 2018-12-04 NOTE — PROGRESS NOTES
[facility] Established Patient Visit    Subjective:           PCP: Chrissy Vanessa    Chief Complaint: Follow-up       HPI:  Fariba Gamez is a 53 y.o. female here for follow-up of her pancreatitis, spent 1 hour with patient regarding various management options of pancreatitis. Patient has had a EUS does not show any cancer. Detailed physical examination was done, all labs reviewed. Patient will be closely followed up.      ROS:   Constitutional: No fevers, chills, weight loss  ENT: No allergies, sore throat, rhinorrhea  CV: No chest pain, palpitations, edema  Pulm: No cough, shortness of breath, wheezing  Ophtho: No vision changes  GI: No blood in stools, change in bowel habits, nausea/vomiting  Denies alternating diarrhea/constipation.   Derm: No rash  Heme: No easy bruising or lymphadenopathy  MSK: No arthralgias or myalgias  : No dysuria, hematuria, frequency, polyuria, or flank tenderness  Endo: No hot or cold intolerance  Neuro: No syncope or seizure, or dizziness  Psych: No hallucination, depression or suicidal ideation      Medical History:  has a past medical history of Alcohol induced acute pancreatitis, Alcoholic cirrhosis of liver without ascites, Alcoholic liver disease, Gastric ulcer, unspecified as acute or chronic, without hemorrhage or perforation, Hyperlipidemia, Hypertension, Left-sided low back pain with left-sided sciatica, Leucopenia (9/12/2018), Normochromic normocytic anemia (9/12/2018), Pancreatic cancer (9/12/2018), Pancreatic mass (9/12/2018), Pancreatitis, Stomach ulcer, Thrombocytopenia (9/12/2018), and Unspecified cirrhosis of liver.      Surgical History:  has no past surgical history on file.    Family History:   Family History   Problem Relation Age of Onset    Hypertension Mother     Kidney failure Mother     Hypertension Father     Heart attack Father        Social History:   Social History     Tobacco Use    Smoking status: Never Smoker    Smokeless  "tobacco: Never Used   Substance Use Topics    Alcohol use: Yes     Comment: socially    Drug use: Yes     Frequency: 7.0 times per week     Types: Marijuana     Comment: daily       The patient's social and family histories were reviewed by me and updated in the appropriate section of the electronic medical record.    Allergies: Review of patient's allergies indicates:  No Known Allergies      Medications:   Current Outpatient Medications   Medication Sig Dispense Refill    amLODIPine (NORVASC) 5 MG tablet Take 1 tablet (5 mg total) by mouth Daily. 90 tablet 0    atorvastatin (LIPITOR) 20 MG tablet Take 1 tablet (20 mg total) by mouth Daily. 90 tablet 0    CARAFATE 100 mg/mL suspension TK 2 TEA PO BID  0    carvedilol (COREG) 3.125 MG tablet Take 3.125 mg by mouth Daily.      carvedilol (COREG) 6.25 MG tablet Take 1 tablet (6.25 mg total) by mouth Daily. 90 tablet 0    CREON CpDR TK 1 C PO TID WC  3    fenofibrate (TRICOR) 48 MG tablet Take 1 tablet (48 mg total) by mouth Daily. 90 tablet 0    hydrALAZINE (APRESOLINE) 50 MG tablet TK 1 T PO Q 12 H  0    lisinopril-hydrochlorothiazide (PRINZIDE,ZESTORETIC) 20-12.5 mg per tablet Take 1 tablet by mouth Daily. 90 tablet 0    magnesium oxide (MAG-OX) 400 mg tablet Take 1 tablet by mouth once daily.  3    pantoprazole (PROTONIX) 40 MG tablet TK 1 T PO QD  0    potassium chloride SA (K-DUR,KLOR-CON) 20 MEQ tablet Take 1 tablet (20 mEq total) by mouth once daily. 90 tablet 0     No current facility-administered medications for this visit.      All medications and past history have been reviewed.        Objective:        Vital Signs:  Blood pressure 124/78, pulse 76, temperature 97.8 °F (36.6 °C), resp. rate 18, height 5' 4" (1.626 m), weight 61.7 kg (136 lb). Body mass index is 23.34 kg/m².    Physical Exam:   General Appearance: Well appearing in no acute distress, well developed, well                nourished  Head: Normocephalic, without obvious " abnormality  Eyes:  Pupils equal, round and reactive to light  Throat: Lips, mucosa, and tongue normal; teeth and gums normal  Lungs: CTA bilaterally in anterior and posterior fields, no wheezes, no crackles  Heart:  Regular rate and rhythm, no murmurs heard  Abdomen: Soft, non tender, non distended with positive bowel sounds in all four quadrants. No hepatosplenomegaly, ascites, or mass. Negative for succusion splash  : female  Extremities: No cyanosis, edema or deformity  Skin: No rash  Neurologic: Normal exam    Labs:  Lab Results   Component Value Date    WBC 3.6 (L) 07/28/2018    HGB 9.6 (L) 07/28/2018    HCT 26.1 (L) 07/28/2018     (L) 07/28/2018    ALT 12 04/09/2010    AST 13 07/28/2018     07/28/2018    K 3.2 (L) 07/28/2018     07/28/2018    CREATININE 0.51 (L) 07/28/2018    BUN <5 (L) 07/28/2018    CO2 21.6 (L) 07/28/2018    INR 1.1 07/27/2018       Imaging:     All lab results and imaging results have been reviewed and discussed with the patient      Assessment:       No diagnosis found.    1. Pancreatic mass  2. abd pain  3. Chronic pancreatitis  4. GERD  5. Gastric ulcer  6. Liver disease  Plan:       There are no diagnoses linked to this encounter.    See HPI    No Follow-up on file.    The plan was discussed with the patient and all questions/concerns have been answered to the patient's satisfaction.        Electronically signed by Chrissy Vanessa MD    This note was dictated using voice recognition software and may contain grammatical errors.       K 3.3L, BUN 6L and Phosphorus 2.3L.

## 2018-12-27 DIAGNOSIS — K25.9 GASTRIC ULCER WITHOUT HEMORRHAGE OR PERFORATION, UNSPECIFIED CHRONICITY: ICD-10-CM

## 2018-12-27 DIAGNOSIS — K85.20 ALCOHOL-INDUCED ACUTE PANCREATITIS, UNSPECIFIED COMPLICATION STATUS: ICD-10-CM

## 2018-12-27 DIAGNOSIS — K70.30 ALCOHOLIC CIRRHOSIS OF LIVER WITHOUT ASCITES: ICD-10-CM

## 2018-12-27 DIAGNOSIS — M54.40 ACUTE RIGHT-SIDED LOW BACK PAIN WITH SCIATICA, SCIATICA LATERALITY UNSPECIFIED: ICD-10-CM

## 2018-12-27 DIAGNOSIS — I10 ESSENTIAL HYPERTENSION: ICD-10-CM

## 2019-01-04 ENCOUNTER — TELEPHONE (OUTPATIENT)
Dept: HEMATOLOGY/ONCOLOGY | Facility: CLINIC | Age: 54
End: 2019-01-04

## 2019-01-04 NOTE — TELEPHONE ENCOUNTER
Called to remind the pt to have labs done prior to f/u appt.  Pt will go to the [facility] labs to have the labs done.    Pt SANDY.

## 2019-01-07 ENCOUNTER — OFFICE VISIT (OUTPATIENT)
Dept: HEMATOLOGY/ONCOLOGY | Facility: CLINIC | Age: 54
End: 2019-01-07
Payer: MEDICARE

## 2019-01-07 VITALS
RESPIRATION RATE: 20 BRPM | TEMPERATURE: 98 F | SYSTOLIC BLOOD PRESSURE: 83 MMHG | WEIGHT: 133.88 LBS | HEART RATE: 62 BPM | DIASTOLIC BLOOD PRESSURE: 48 MMHG | BODY MASS INDEX: 22.98 KG/M2

## 2019-01-07 DIAGNOSIS — D72.819 LEUKOPENIA, UNSPECIFIED TYPE: ICD-10-CM

## 2019-01-07 DIAGNOSIS — K86.89 PANCREATIC MASS: ICD-10-CM

## 2019-01-07 DIAGNOSIS — D64.9 NORMOCHROMIC NORMOCYTIC ANEMIA: ICD-10-CM

## 2019-01-07 DIAGNOSIS — D69.6 THROMBOCYTOPENIA: Primary | ICD-10-CM

## 2019-01-07 DIAGNOSIS — K86.1 CHRONIC CALCIFIC PANCREATITIS: ICD-10-CM

## 2019-01-07 PROCEDURE — 99213 PR OFFICE/OUTPT VISIT, EST, LEVL III, 20-29 MIN: ICD-10-PCS | Mod: ,,, | Performed by: INTERNAL MEDICINE

## 2019-01-07 PROCEDURE — 99213 OFFICE O/P EST LOW 20 MIN: CPT | Mod: ,,, | Performed by: INTERNAL MEDICINE

## 2019-01-07 NOTE — PROGRESS NOTES
[facility] Hematology/Oncology  PROGRESS NOTE       Subjective:       Patient ID:   NAME: Fariba Gamez : 1965     53 y.o. female    Referring Doc: Rut  Other Physicians: Otf; Shekhar Do, Natty Cummings    Chief Complaint:  Pancreatic mass f/u    History of Present Illness:     Patient returns today for a 3rd regularly scheduled follow-up visit.  The patient is here today to go over the results of the recently ordered labs, tests and studies. She saw Dr Vanessa recently for follow-up on 2018. She feels great and has been eating well. She denies any CP, SOB, Ha's or N/V. She did not do my requested labs despite being called for a reminder this past Friday. She saw Dr Bettencourt recently. She reports some leg cramps.           ROS:   GEN: normal without any fever, night sweats or weight loss  HEENT: normal with no HA's, sore throat, stiff neck, changes in vision  CV: normal with no CP, SOB, PND, MARTIN or orthopnea  PULM: normal with no SOB, cough, hemoptysis, sputum or pleuritic pain  GI: normal with no abdominal pain, nausea, vomiting, constipation, diarrhea, melanotic stools, BRBPR, or hematemesis  : normal with no hematuria, dysuria  BREAST: normal with no mass, discharge, pain  SKIN: normal with no rash, erythema, bruising, or swelling    Allergies:  Review of patient's allergies indicates:  No Known Allergies    Medications:    Current Outpatient Medications:     amLODIPine (NORVASC) 5 MG tablet, Take 1 tablet (5 mg total) by mouth Daily., Disp: 90 tablet, Rfl: 0    atorvastatin (LIPITOR) 20 MG tablet, Take 1 tablet (20 mg total) by mouth Daily., Disp: 90 tablet, Rfl: 0    CARAFATE 100 mg/mL suspension, TK 2 TEA PO BID, Disp: , Rfl: 0    carvedilol (COREG) 3.125 MG tablet, Take 3.125 mg by mouth Daily., Disp: , Rfl:     carvedilol (COREG) 6.25 MG tablet, Take 1 tablet (6.25 mg total) by mouth Daily., Disp: 90 tablet, Rfl: 0    CREON CpDR, TK 1 C PO TID WC, Disp: , Rfl: 3     fenofibrate (TRICOR) 48 MG tablet, Take 1 tablet (48 mg total) by mouth Daily., Disp: 90 tablet, Rfl: 0    hydrALAZINE (APRESOLINE) 50 MG tablet, TK 1 T PO Q 12 H, Disp: , Rfl: 0    lisinopril-hydrochlorothiazide (PRINZIDE,ZESTORETIC) 20-12.5 mg per tablet, Take 1 tablet by mouth Daily., Disp: 90 tablet, Rfl: 0    magnesium oxide (MAG-OX) 400 mg tablet, Take 1 tablet by mouth once daily., Disp: , Rfl: 3    pantoprazole (PROTONIX) 40 MG tablet, TK 1 T PO QD, Disp: , Rfl: 0    potassium chloride SA (K-DUR,KLOR-CON) 20 MEQ tablet, Take 1 tablet (20 mEq total) by mouth once daily., Disp: 90 tablet, Rfl: 0    PMHx/PSHx Updates:  See patient's last visit with me on 10/4/2018.  See H&P on 9/12/2018        Pathology:  Cancer Staging  No matching staging information was found for the patient.          Objective:     Vitals:  Blood pressure (!) 83/48, pulse 62, temperature 98.3 °F (36.8 °C), resp. rate 20, weight 60.7 kg (133 lb 14.4 oz).    Physical Examination:   GEN: no apparent distress, comfortable; AAOx3  HEAD: atraumatic and normocephalic  EYES: no pallor, no icterus, PERRLA  ENT: OMM, no pharyngeal erythema, external ears WNL; no nasal discharge; no thrush  NECK: no masses, thyroid normal, trachea midline, no LAD/LN's, supple  CV: RRR with no murmur; normal pulse; normal S1 and S2; mild pedal edema  CHEST: Normal respiratory effort; CTAB; normal breath sounds; no wheeze or crackles  ABDOM: nontender; soft; normal bowel sounds; no rebound/guarding; mildly distended  MUSC/Skeletal: ROM normal; no crepitus; joints normal; no deformities or arthropathy  EXTREM: no clubbing, cyanosis, inflammation or swelling; mild edema (pedal) bilat  SKIN: no rashes, lesions, ulcers, petechiae    or subcutaneous nodules  : no peña  NEURO: grossly intact; motor/sensory WNL; AAOx3; no tremors  PSYCH: normal mood, affect and behavior  LYMPH: normal cervical, supraclavicular, axillary and groin LN's            Labs:     1/5/2019 on  chart    Radiology/Diagnostic Studies:    Ct Chest Abdoment Pelvis With Contrast    Result Date: 9/26/2018  EXAMINATION: CT CHEST ABDOMEN PELVIS WITH CONTRAST (XPD) CLINICAL HISTORY: panc mass; Disease of pancreas, unspecified TECHNIQUE: Low dose axial images, sagittal and coronal reformations were obtained from the thoracic inlet to the pubic symphysis following the IV administration of 75 mL of Omnipaque 350 . No oral contrast was administered.  Pancreatic mass protocol was implemented. COMPARISON: No relevant prior. FINDINGS: There is a heterogeneous exophytic left thyroid nodule measuring 2.0 cm.  Recommend thyroid ultrasound for further evaluation. Heart is normal in size.  Pulmonary arteries distribute normally.  Aorta maintains a normal caliber throughout the thorax and abdomen. No mediastinal lymphadenopathy.  Prominent right hilar lymph node noted. The trachea and proximal airways are patent.  The lungs are equally well expanded.  No large consolidative opacity.  There is minimal platelike atelectasis at the right lung base.  No pleural effusion.  No pneumothorax.  There is a small, 0.4 cm nodule within the lateral segment of the right middle lobe (series 3, image 70). The liver appears normal in size.  No focal hepatic lesions.  Portal vein is patent. There are some small calcified gallstones.  No biliary ductal dilatation.  Spleen, adrenal glands, and stomach are unremarkable. In the expected region of the pancreatic head, there is a poorly marginated, subtle area of relative hyper enhancement measuring 1.3 cm, possibly representing the patient's reported pancreatic head mass.  Remaining pancreatic tissue demonstrates extensive atrophy. Bowel shows no evidence of inflammation or obstruction. Kidneys are normal in size.  There is mild right pelviectasis.  Visualized portions of the ureters are unremarkable.  There is asymmetric right greater than left urinary bladder wall thickening.  Right adnexal  hypodensity likely represents an ovarian cyst. Aorta maintains a normal caliber. Soft tissues are unremarkable.  Osseous structures demonstrate no significant abnormality.     In the expected region of the pancreatic head, there is a poorly marginated, inconspicuous area of lesion like enhancement measuring 1.3 cm, possibly representing the patient's reported pancreatic head mass.  Correlation with EUS is recommended. 0.4 cm nodule within the lateral segment of the right middle lobe.  In a low risk patient, no further follow-up is recommended.  In a high-risk patient (history of smoking and/or malignancy) consider follow-up chest CT in 12 months. Heterogeneous, exophytic left thyroid nodule measuring 2.0 cm.  Thyroid ultrasound is recommended for further evaluation. Mild right pelviectasis and asymmetric right urinary bladder wall thickening. Clinical correlation recommended. Probable right ovarian cyst. This report was flagged in Epic as abnormal. Electronically signed by resident: Isacc Jackson Date:    09/26/2018 Time:    11:21 Electronically signed by: Rambo Childers MD Date:    09/26/2018 Time:    11:54      I have reviewed all available lab results and radiology reports.    Assessment/Plan:   (1) 53 y.o. female with diagnosis of pancreatic mass in head of pancrease seen on Ultrasonography on 8/5  - she is followed by Dr Vanessa with GI  - she saw Dr Corrigan/Dr Cummings per my request, had EUS, and she reports that he was unable to identify any pancreatic mass but she had some inflammation.   - she saw Dr Enriquez with Surg-Onc at [facility] on 9/26/2018 and he was awaiting reports from Dr Cummings  - she saw Dr Vanessa with GI on 12/4/2018     (2) Alcoholic liver disease/cirrhosis and prior alcoholic pancreatitis  - she has been sober for several months     (3) HTN and hypercholesterolemia     (4) prior stomach ulcers     (5) NCNC anemia, leucopenia and mild thrombocytopenia - most likely related to her liver  disease    (6) Noncompliance issues                   Thrombocytopenia    Normochromic normocytic anemia    Leukopenia, unspecified type    Chronic calcific pancreatitis    Pancreatic mass          PLAN:  1. Request reports from Dr Corrigan/Emiliano  2. I requested that Dr Vanessa send us his recommendations as his last note from 12/4 had no assessment or plan  3. Schedule repeat CT scans for end of this month  4. RTC in 3 months  5. Encouraged compliance  6. Check up to date labs  Fax note to Shekhar Vanessa Bolton, Bernstein, [place]    Discussion:       I spent over 25 mins of time with the patient. Reviewed results of the recently ordered labs, tests and studies; made directives with regards to the results. Over half of this time was spent couseling and coordinating care.    I have explained all of the above in detail and the patient understands all of the current recommendation(s). I have answered all of their questions to the best of my ability and to their complete satisfaction.   The patient is to continue with the current management plan.          ADDENDUM:   When my office called Dr Corrigan's office to get his reports, we were told that the patient has missed two scheduled appointments with him and he has never seen her. We will call Dr Cummings's office to see if she saw them instead. We will also call patient as well.         Electronically signed by Manjinder Christy MD

## 2019-01-07 NOTE — LETTER
January 7, 2019      Arnel Bettencourt Jr., MD  [address] [place] [place] [address]           [facility] - Hematology Oncology  [address]  [address]  [place] [address]  Phone: [phone]  Fax: [phone]          Patient: Fariba Gamez   MR Number: [id number]   YOB: 1965   Date of Visit: 1/7/2019       Dear Dr. Arnel Bettencourt Jr.:    Thank you for referring Fariba Gamez to me for evaluation. Attached you will find relevant portions of my assessment and plan of care.    If you have questions, please do not hesitate to call me. I look forward to following Fariba Gamez along with you.    Sincerely,    Manjinder Christy MD    Enclosure  CC:  No Recipients    If you would like to receive this communication electronically, please contact [email or web address] or [phone] to request more information on [employer] access.    For providers and/or their staff who would like to refer a patient to [facility], please contact us through our one-stop-shop provider referral line, [facility], at [phone].    If you feel you have received this communication in error or would no longer like to receive these types of communications, please e-mail [email or web address]

## 2019-01-10 RX ORDER — CARVEDILOL 6.25 MG/1
TABLET ORAL
Qty: 90 TABLET | Refills: 0 | Status: SHIPPED | OUTPATIENT
Start: 2019-01-10

## 2019-01-10 RX ORDER — ATORVASTATIN CALCIUM 20 MG/1
TABLET, FILM COATED ORAL
Qty: 90 TABLET | Refills: 0 | Status: SHIPPED | OUTPATIENT
Start: 2019-01-10

## 2019-01-28 ENCOUNTER — TELEPHONE (OUTPATIENT)
Dept: GASTROENTEROLOGY | Facility: CLINIC | Age: 54
End: 2019-01-28

## 2019-01-28 DIAGNOSIS — K76.9 LIVER DISEASE: ICD-10-CM

## 2019-01-28 DIAGNOSIS — K86.89 PANCREATIC MASS: ICD-10-CM

## 2019-01-28 DIAGNOSIS — D64.9 ANEMIA, UNSPECIFIED TYPE: ICD-10-CM

## 2019-01-28 DIAGNOSIS — R10.9 ABDOMINAL PAIN, UNSPECIFIED ABDOMINAL LOCATION: Primary | ICD-10-CM

## 2019-01-28 RX ORDER — POLYETHYLENE GLYCOL 3350, SODIUM SULFATE ANHYDROUS, SODIUM BICARBONATE, SODIUM CHLORIDE, POTASSIUM CHLORIDE 236; 22.74; 6.74; 5.86; 2.97 G/4L; G/4L; G/4L; G/4L; G/4L
4 POWDER, FOR SOLUTION ORAL ONCE
Qty: 1 BOTTLE | Refills: 0 | Status: SHIPPED | OUTPATIENT
Start: 2019-01-28 | End: 2019-01-28

## 2019-01-28 NOTE — TELEPHONE ENCOUNTER
----- Message from Lita Deleon sent at 1/28/2019 11:16 AM CST -----  PT IS HAVING COLONOSCOPY ON 2/8/19 PER DR REYEZ.  NEED ORDERS PLACED AND PREP SENT TO HER PHARMACY.

## 2019-01-31 ENCOUNTER — TELEPHONE (OUTPATIENT)
Dept: HEMATOLOGY/ONCOLOGY | Facility: CLINIC | Age: 54
End: 2019-01-31

## 2019-01-31 NOTE — TELEPHONE ENCOUNTER
Called to see if the pt had any labs done prior to f/u appt.    Called to cancel the appt as the imaging was r/s to 2/7/19. Pt was given clarification on her schedule and oral contast instructions.

## 2019-02-07 ENCOUNTER — TELEPHONE (OUTPATIENT)
Dept: HEMATOLOGY/ONCOLOGY | Facility: CLINIC | Age: 54
End: 2019-02-07

## 2019-02-07 LAB — ISTAT CREATININE: 1.1 MG/DL (ref 0.6–1.4)

## 2019-02-07 NOTE — TELEPHONE ENCOUNTER
Called to see if the pt had any labs done prior to f/u appt.    Pt recently went to the ER and she had scan and labs there.     Pulled recordeds and confirmed the appt for Monday.     RADHA DELGADO

## 2019-02-10 NOTE — PROGRESS NOTES
[facility] Hematology/Oncology  PROGRESS NOTE       Subjective:       Patient ID:   NAME: Fariba Gamez : 1965     53 y.o. female    Referring Doc: Rut  Other Physicians: Otf; Shekhar Do, Natty Cummings    Chief Complaint:  Pancreatic mass f/u    History of Present Illness:     Patient returns today for a 3rd regularly scheduled follow-up visit.  The patient is here today to go over the results of the recently ordered labs, tests and studies. She saw Dr Vanessa recently for follow-up on 2018. She feels great and has been eating well. She denies any CP, SOB, Ha's or N/V.She  had labs done on 2019. She had repeat CT scans on 2019. She sees Dr Cummings again on .         ROS:   GEN: normal without any fever, night sweats or weight loss  HEENT: normal with no HA's, sore throat, stiff neck, changes in vision  CV: normal with no CP, SOB, PND, MARTIN or orthopnea  PULM: normal with no SOB, cough, hemoptysis, sputum or pleuritic pain  GI: normal with no abdominal pain, nausea, vomiting, constipation, diarrhea, melanotic stools, BRBPR, or hematemesis  : normal with no hematuria, dysuria  BREAST: normal with no mass, discharge, pain  SKIN: normal with no rash, erythema, bruising, or swelling    Allergies:  Review of patient's allergies indicates:  No Known Allergies    Medications:    Current Outpatient Medications:     amLODIPine (NORVASC) 5 MG tablet, Take 1 tablet (5 mg total) by mouth Daily., Disp: 90 tablet, Rfl: 0    atorvastatin (LIPITOR) 20 MG tablet, TAKE 1 TABLET(20 MG) BY MOUTH DAILY, Disp: 90 tablet, Rfl: 0    CARAFATE 100 mg/mL suspension, TK 2 TEA PO BID, Disp: , Rfl: 0    carvedilol (COREG) 3.125 MG tablet, Take 3.125 mg by mouth Daily., Disp: , Rfl:     carvedilol (COREG) 6.25 MG tablet, TAKE 1 TABLET(6.25 MG) BY MOUTH DAILY, Disp: 90 tablet, Rfl: 0    CREON CpDR, TK 1 C PO TID WC, Disp: , Rfl: 3    fenofibrate (TRICOR) 48 MG tablet, Take 1 tablet (48 mg total) by  mouth Daily., Disp: 90 tablet, Rfl: 0    hydrALAZINE (APRESOLINE) 50 MG tablet, TK 1 T PO Q 12 H, Disp: , Rfl: 0    lisinopril-hydrochlorothiazide (PRINZIDE,ZESTORETIC) 20-12.5 mg per tablet, Take 1 tablet by mouth Daily., Disp: 90 tablet, Rfl: 0    magnesium oxide (MAG-OX) 400 mg tablet, Take 1 tablet by mouth once daily., Disp: , Rfl: 3    pantoprazole (PROTONIX) 40 MG tablet, TK 1 T PO QD, Disp: , Rfl: 0    potassium chloride SA (K-DUR,KLOR-CON) 20 MEQ tablet, Take 1 tablet (20 mEq total) by mouth once daily., Disp: 90 tablet, Rfl: 0    PMHx/PSHx Updates:  See patient's last visit with me on 1/7/2019  See H&P on 9/12/2018        Pathology:  Cancer Staging  No matching staging information was found for the patient.          Objective:     Vitals:  Blood pressure 121/76, pulse 62, temperature 98 °F (36.7 °C), resp. rate 20, weight 57.6 kg (126 lb 14.4 oz).    Physical Examination:   GEN: no apparent distress, comfortable; AAOx3  HEAD: atraumatic and normocephalic  EYES: no pallor, no icterus, PERRLA  ENT: OMM, no pharyngeal erythema, external ears WNL; no nasal discharge; no thrush  NECK: no masses, thyroid normal, trachea midline, no LAD/LN's, supple  CV: RRR with no murmur; normal pulse; normal S1 and S2; mild pedal edema  CHEST: Normal respiratory effort; CTAB; normal breath sounds; no wheeze or crackles  ABDOM: nontender; soft; normal bowel sounds; no rebound/guarding; mildly distended  MUSC/Skeletal: ROM normal; no crepitus; joints normal; no deformities or arthropathy  EXTREM: no clubbing, cyanosis, inflammation or swelling; mild edema (pedal) bilat  SKIN: no rashes, lesions, ulcers, petechiae    or subcutaneous nodules  : no peña  NEURO: grossly intact; motor/sensory WNL; AAOx3; no tremors  PSYCH: normal mood, affect and behavior  LYMPH: normal cervical, supraclavicular, axillary and groin LN's            Labs:     2/4/2019    Radiology/Diagnostic Studies:    CT Abdom 2/7/2019    IMPRESSION:    1.  Poorly defined round slightly heterogeneous focus on early arterial phase imaging in pancreatic head measuring up to 11 mm in size, occurring at site of previous cystic-appearing pancreatic mass on 02/03/2017 MRI. It is likely that  this has not significantly changed in size, although comparison on current CT imaging with that of prior MR imaging is suboptimal. In addition to the previously reported differential diagnosis, cystic islet cell tumor is an additional consideration. Considerations for further evaluation include  endoscopic ultrasound targeted to the pancreatic head with potential tissue sampling of the potential persistent pancreatic head mass and/or repeat pancreatic protocol MRI abdomen without and with IV contrast for more precise comparison with the prior MRI.    2. New enlarged 12 mm celiac axis lymph node could either be reactive in nature due to infectious or inflammatory process or indicate neoplasm such as lymphoma  or metastatic disease. There is no additional evidence of metastatic disease or enlarged lymph nodes however. It is conceivable this too could be reassessed at time of potential endoscopic ultrasound, although if this is unable to be performed, short-term imaging follow-up with either CT or MRI of the abdomen would be helpful to evaluate for stability.    3. Resolution of acute pancreatitis since 07/23/2018, now with few scattered pancreatic parenchymal calcifications which could indicate sequelae of chronic  pancreatitis.    4. Cholelithiasis.    5. Mild bilateral urothelial enhancement can be seen with urinary tract infection or reflux.    6. 37 mm left ovarian cyst is new since 07/23/2018. This is statistically likely of benign etiology and may be physiologic in nature. Further evaluation with ultrasound pelvis in 12 weeks is suggested to evaluate for resolution.    7. Unchanged 4 mm right middle lobe nodule.      CXR  2/7/2019:    IMPRESSION: No acute intrathoracic abnormality  seen              Ct Chest Abdoment Pelvis With Contrast    Result Date: 9/26/2018  EXAMINATION: CT CHEST ABDOMEN PELVIS WITH CONTRAST (XPD) CLINICAL HISTORY: panc mass; Disease of pancreas, unspecified TECHNIQUE: Low dose axial images, sagittal and coronal reformations were obtained from the thoracic inlet to the pubic symphysis following the IV administration of 75 mL of Omnipaque 350 . No oral contrast was administered.  Pancreatic mass protocol was implemented. COMPARISON: No relevant prior. FINDINGS: There is a heterogeneous exophytic left thyroid nodule measuring 2.0 cm.  Recommend thyroid ultrasound for further evaluation. Heart is normal in size.  Pulmonary arteries distribute normally.  Aorta maintains a normal caliber throughout the thorax and abdomen. No mediastinal lymphadenopathy.  Prominent right hilar lymph node noted. The trachea and proximal airways are patent.  The lungs are equally well expanded.  No large consolidative opacity.  There is minimal platelike atelectasis at the right lung base.  No pleural effusion.  No pneumothorax.  There is a small, 0.4 cm nodule within the lateral segment of the right middle lobe (series 3, image 70). The liver appears normal in size.  No focal hepatic lesions.  Portal vein is patent. There are some small calcified gallstones.  No biliary ductal dilatation.  Spleen, adrenal glands, and stomach are unremarkable. In the expected region of the pancreatic head, there is a poorly marginated, subtle area of relative hyper enhancement measuring 1.3 cm, possibly representing the patient's reported pancreatic head mass.  Remaining pancreatic tissue demonstrates extensive atrophy. Bowel shows no evidence of inflammation or obstruction. Kidneys are normal in size.  There is mild right pelviectasis.  Visualized portions of the ureters are unremarkable.  There is asymmetric right greater than left urinary bladder wall thickening.  Right adnexal hypodensity likely represents  an ovarian cyst. Aorta maintains a normal caliber. Soft tissues are unremarkable.  Osseous structures demonstrate no significant abnormality.     In the expected region of the pancreatic head, there is a poorly marginated, inconspicuous area of lesion like enhancement measuring 1.3 cm, possibly representing the patient's reported pancreatic head mass.  Correlation with EUS is recommended. 0.4 cm nodule within the lateral segment of the right middle lobe.  In a low risk patient, no further follow-up is recommended.  In a high-risk patient (history of smoking and/or malignancy) consider follow-up chest CT in 12 months. Heterogeneous, exophytic left thyroid nodule measuring 2.0 cm.  Thyroid ultrasound is recommended for further evaluation. Mild right pelviectasis and asymmetric right urinary bladder wall thickening. Clinical correlation recommended. Probable right ovarian cyst. This report was flagged in Epic as abnormal. Electronically signed by resident: Isacc Jackson Date:    09/26/2018 Time:    11:21 Electronically signed by: Rambo Childers MD Date:    09/26/2018 Time:    11:54      I have reviewed all available lab results and radiology reports.    Assessment/Plan:   (1) 53 y.o. female with diagnosis of pancreatic mass in head of pancrease seen on Ultrasonography on 8/5  - she is followed by Dr Vanessa with GI  - she saw Dr Corrigan/Dr Cummings per my request, had EUS, and she reports that he was unable to identify any pancreatic mass but she had some inflammation.   - she saw Dr Enriquez with Surg-Onc at [facility] on 9/26/2018 and he was awaiting reports from Dr Cummings  - she saw Dr Vanessa with GI on 12/4/2018  - she had recent scans on 2/7/2019     (2) Alcoholic liver disease/cirrhosis and prior alcoholic pancreatitis  - she has been sober for several months     (3) HTN and hypercholesterolemia     (4) prior stomach ulcers     (5) NCNC anemia, leucopenia and mild thrombocytopenia - most likely related to her  liver disease  - latest hgb 13.7 and WNL    (6) Noncompliance issues    (7) Left ovarian cyst - will refer to GYN                   Thrombocytopenia    Normochromic normocytic anemia    Leukopenia, unspecified type    Pancreatic mass          PLAN:  1.  F/u with GI and hepatology, and Dr Enriquez  2. I requested that Dr Vanessa send us his recommendations as his last note from 12/4 had no assessment or plan  3.  Get repeat MRI of abdom with pancreatic protocol in 3 months per radiology's recommendations  4. RTC in 3 months  5. Encouraged compliance  6. Refer to GYN for left ovarian cyst    Fax note to Shekhar Vanessa Bolton, Bernstein, [place]    Discussion:       I spent over 25 mins of time with the patient. Reviewed results of the recently ordered labs, tests and studies; made directives with regards to the results. Over half of this time was spent couseling and coordinating care.    I have explained all of the above in detail and the patient understands all of the current recommendation(s). I have answered all of their questions to the best of my ability and to their complete satisfaction.   The patient is to continue with the current management plan.              Electronically signed by Manjinder Christy MD

## 2019-02-11 ENCOUNTER — OFFICE VISIT (OUTPATIENT)
Dept: HEMATOLOGY/ONCOLOGY | Facility: CLINIC | Age: 54
End: 2019-02-11
Payer: MEDICARE

## 2019-02-11 ENCOUNTER — TELEPHONE (OUTPATIENT)
Dept: HEMATOLOGY/ONCOLOGY | Facility: CLINIC | Age: 54
End: 2019-02-11

## 2019-02-11 VITALS
RESPIRATION RATE: 20 BRPM | HEART RATE: 62 BPM | TEMPERATURE: 98 F | WEIGHT: 126.88 LBS | SYSTOLIC BLOOD PRESSURE: 121 MMHG | DIASTOLIC BLOOD PRESSURE: 76 MMHG | BODY MASS INDEX: 21.78 KG/M2

## 2019-02-11 DIAGNOSIS — D69.6 THROMBOCYTOPENIA: Primary | ICD-10-CM

## 2019-02-11 DIAGNOSIS — D72.819 LEUKOPENIA, UNSPECIFIED TYPE: ICD-10-CM

## 2019-02-11 DIAGNOSIS — D64.9 NORMOCHROMIC NORMOCYTIC ANEMIA: ICD-10-CM

## 2019-02-11 DIAGNOSIS — K86.89 PANCREATIC MASS: ICD-10-CM

## 2019-02-11 DIAGNOSIS — N83.202 CYST OF LEFT OVARY: ICD-10-CM

## 2019-02-11 PROCEDURE — 99213 PR OFFICE/OUTPT VISIT, EST, LEVL III, 20-29 MIN: ICD-10-PCS | Mod: ,,, | Performed by: INTERNAL MEDICINE

## 2019-02-11 PROCEDURE — 99213 OFFICE O/P EST LOW 20 MIN: CPT | Mod: ,,, | Performed by: INTERNAL MEDICINE

## 2019-02-11 NOTE — TELEPHONE ENCOUNTER
Patient has appointment 02/11/2019 to go over results     ----- Message from Manjinder Christy MD sent at 2/10/2019  3:16 PM CST -----  She needs RTC to go over

## 2019-03-26 ENCOUNTER — OFFICE VISIT (OUTPATIENT)
Dept: GASTROENTEROLOGY | Facility: CLINIC | Age: 54
End: 2019-03-26
Payer: MEDICARE

## 2019-03-26 VITALS
RESPIRATION RATE: 18 BRPM | HEART RATE: 60 BPM | WEIGHT: 127.81 LBS | BODY MASS INDEX: 21.82 KG/M2 | HEIGHT: 64 IN | DIASTOLIC BLOOD PRESSURE: 72 MMHG | TEMPERATURE: 97 F | SYSTOLIC BLOOD PRESSURE: 112 MMHG

## 2019-03-26 DIAGNOSIS — R10.9 ABDOMINAL PAIN, UNSPECIFIED ABDOMINAL LOCATION: ICD-10-CM

## 2019-03-26 DIAGNOSIS — K21.9 GASTROESOPHAGEAL REFLUX DISEASE WITHOUT ESOPHAGITIS: ICD-10-CM

## 2019-03-26 DIAGNOSIS — I10 ESSENTIAL HYPERTENSION: ICD-10-CM

## 2019-03-26 DIAGNOSIS — K76.9 LIVER DISEASE: Primary | ICD-10-CM

## 2019-03-26 DIAGNOSIS — K70.9 ALCOHOLIC LIVER DISEASE: ICD-10-CM

## 2019-03-26 DIAGNOSIS — D64.9 ANEMIA, UNSPECIFIED TYPE: ICD-10-CM

## 2019-03-26 DIAGNOSIS — K86.1 CHRONIC PANCREATITIS, UNSPECIFIED PANCREATITIS TYPE: ICD-10-CM

## 2019-03-26 PROCEDURE — 99215 PR OFFICE/OUTPT VISIT, EST, LEVL V, 40-54 MIN: ICD-10-PCS | Mod: ,,, | Performed by: INTERNAL MEDICINE

## 2019-03-26 PROCEDURE — 99215 OFFICE O/P EST HI 40 MIN: CPT | Mod: ,,, | Performed by: INTERNAL MEDICINE

## 2019-03-26 RX ORDER — SPIRONOLACTONE 25 MG/1
25 TABLET ORAL DAILY
Refills: 3 | COMMUNITY
Start: 2019-01-29

## 2019-03-26 NOTE — PROGRESS NOTES
[facility] Established Patient Visit    Subjective:           PCP: Chrissy Vanessa    Chief Complaint: No chief complaint on file.       HPI:  Fariba Gamez is a 53 y.o. female here for follow-up of recent colonoscopy and EUS. A detailed examination was done and there was no definite evidence of cancer. She had a needle aspiration of the pancreas and going to be followed by Dr. Cummings. Reviewed 3 months of lab work in detail and this was within normal limits. There were no fresh objective findings. She has been advised to refrain from drinking. Her blood pressure is in good condition. Will check hepatitis profile if not already done.      ROS:   Constitutional: No fevers, chills, weight loss  ENT: No allergies, sore throat, rhinorrhea  CV: No chest pain, palpitations, edema  Pulm: No cough, shortness of breath, wheezing  Ophtho: No vision changes  GI: No blood in stools, change in bowel habits, nausea/vomiting  Denies alternating diarrhea/constipation.   Derm: No rash  Heme: No easy bruising or lymphadenopathy  MSK: No arthralgias or myalgias  : No dysuria, hematuria, frequency, polyuria, or flank tenderness  Endo: No hot or cold intolerance  Neuro: No syncope or seizure, or dizziness  Psych: No hallucination, depression or suicidal ideation      Medical History:  has a past medical history of Alcohol induced acute pancreatitis, Alcoholic cirrhosis of liver without ascites, Alcoholic liver disease, Gastric ulcer, unspecified as acute or chronic, without hemorrhage or perforation, Hyperlipidemia, Hypertension, Left-sided low back pain with left-sided sciatica, Leucopenia (9/12/2018), Normochromic normocytic anemia (9/12/2018), Pancreatic cancer (9/12/2018), Pancreatic mass (9/12/2018), Pancreatitis, Stomach ulcer, Thrombocytopenia (9/12/2018), and Unspecified cirrhosis of liver.      Surgical History:  has no past surgical history on file.    Family History:   Family History   Problem Relation Age  of Onset    Hypertension Mother     Kidney failure Mother     Hypertension Father     Heart attack Father        Social History:   Social History     Tobacco Use    Smoking status: Never Smoker    Smokeless tobacco: Never Used   Substance Use Topics    Alcohol use: Yes     Comment: socially    Drug use: Yes     Frequency: 7.0 times per week     Types: Marijuana     Comment: daily       The patient's social and family histories were reviewed by me and updated in the appropriate section of the electronic medical record.    Allergies: Review of patient's allergies indicates:  No Known Allergies      Medications:   Current Outpatient Medications   Medication Sig Dispense Refill    amLODIPine (NORVASC) 5 MG tablet Take 1 tablet (5 mg total) by mouth Daily. 90 tablet 0    atorvastatin (LIPITOR) 20 MG tablet TAKE 1 TABLET(20 MG) BY MOUTH DAILY 90 tablet 0    CARAFATE 100 mg/mL suspension TK 2 TEA PO BID  0    CREON CpDR TK 1 C PO TID WC  3    fenofibrate (TRICOR) 48 MG tablet Take 1 tablet (48 mg total) by mouth Daily. 90 tablet 0    hydrALAZINE (APRESOLINE) 50 MG tablet TK 1 T PO Q 12 H  0    lisinopril-hydrochlorothiazide (PRINZIDE,ZESTORETIC) 20-12.5 mg per tablet Take 1 tablet by mouth Daily. 90 tablet 0    magnesium oxide (MAG-OX) 400 mg tablet Take 1 tablet by mouth once daily.  3    pantoprazole (PROTONIX) 40 MG tablet TK 1 T PO QD  0    potassium chloride SA (K-DUR,KLOR-CON) 20 MEQ tablet Take 1 tablet (20 mEq total) by mouth once daily. 90 tablet 0    carvedilol (COREG) 3.125 MG tablet Take 3.125 mg by mouth Daily.      carvedilol (COREG) 6.25 MG tablet TAKE 1 TABLET(6.25 MG) BY MOUTH DAILY 90 tablet 0    spironolactone (ALDACTONE) 25 MG tablet Take 1 tablet by mouth once daily.  3     No current facility-administered medications for this visit.      All medications and past history have been reviewed.        Objective:        Vital Signs:  Blood pressure 112/72, pulse 60, temperature 97.2 °F  "(36.2 °C), resp. rate 18, height 5' 4" (1.626 m), weight 58 kg (127 lb 12.8 oz). Body mass index is 21.94 kg/m².    Physical Exam:   General Appearance: Well appearing in no acute distress, well developed, well                nourished  Head: Normocephalic, without obvious abnormality  Eyes:  Pupils equal, round and reactive to light  Throat: Lips, mucosa, and tongue normal; teeth and gums normal  Lungs: CTA bilaterally in anterior and posterior fields, no wheezes, no crackles  Heart:  Regular rate and rhythm, no murmurs heard  Abdomen: Soft, non tender, non distended with positive bowel sounds in all four quadrants. No hepatosplenomegaly, ascites, or mass. Negative for succusion splash  : female   Extremities: No cyanosis, edema or deformity  Skin: No rash  Neurologic: Normal exam    Labs:  Lab Results   Component Value Date    WBC 3.6 (L) 07/28/2018    HGB 9.6 (L) 07/28/2018    HCT 26.1 (L) 07/28/2018     (L) 07/28/2018    ALT 12 04/09/2010    AST 13 07/28/2018     07/28/2018    K 3.2 (L) 07/28/2018     07/28/2018    CREATININE 0.51 (L) 07/28/2018    BUN <5 (L) 07/28/2018    CO2 21.6 (L) 07/28/2018    INR 1.1 07/27/2018       Imaging:     All lab results and imaging results have been reviewed and discussed with the patient      Assessment:       1. Liver disease    2. Alcoholic liver disease    3. Abdominal pain, unspecified abdominal location    4. Gastroesophageal reflux disease without esophagitis    5. Chronic pancreatitis, unspecified pancreatitis type    6. Anemia, unspecified type    7. Essential hypertension        Plan:       Diagnoses and all orders for this visit:    Liver disease    Alcoholic liver disease    Abdominal pain, unspecified abdominal location    Gastroesophageal reflux disease without esophagitis    Chronic pancreatitis, unspecified pancreatitis type    Anemia, unspecified type    Essential hypertension        See HPI    No follow-ups on file.    The plan was discussed " with the patient and all questions/concerns have been answered to the patient's satisfaction.        Electronically signed by Chrissy Vanessa MD    This note was dictated using voice recognition software and may contain grammatical errors.

## 2019-08-14 ENCOUNTER — TELEPHONE (OUTPATIENT)
Dept: HEMATOLOGY/ONCOLOGY | Facility: CLINIC | Age: 54
End: 2019-08-14

## 2019-08-14 NOTE — TELEPHONE ENCOUNTER
Called to see if the pt did the MRI or the labs that was ordered at the last office visit.     Pt did labs but not the MRI.    Pt will f/u tomorrow.

## 2019-08-14 NOTE — PROGRESS NOTES
[facility] Hematology/Oncology  PROGRESS NOTE       Subjective:       Patient ID:   NAME: Fariba Gamez : 1965     53 y.o. female    Referring Doc: Rut  Other Physicians: Otf; Shekhar Do, Natty Cummings    Chief Complaint:  Pancreatic mass f/u    History of Present Illness:     Patient returns today for a regularly scheduled follow-up visit.  The patient is here today to go over the results of the recently ordered labs, tests and studies. She saw Dr Vanessa recently for follow-up on 2019. She feels great and has been eating well. She denies any CP, SOB, Ha's or N/V.    She was supposed to get an MRI of the abdomen but she did not have it done. She has not been getting regular blood work either.     She had pancreatic mass biopsy with Dr Cummings at [facility] on 3/14/2019.     She is seeing Dr Cummings next week.         ROS:   GEN: normal without any fever, night sweats or weight loss  HEENT: normal with no HA's, sore throat, stiff neck, changes in vision  CV: normal with no CP, SOB, PND, MARTIN or orthopnea  PULM: normal with no SOB, cough, hemoptysis, sputum or pleuritic pain  GI: normal with no abdominal pain, nausea, vomiting, constipation, diarrhea, melanotic stools, BRBPR, or hematemesis  : normal with no hematuria, dysuria  BREAST: normal with no mass, discharge, pain  SKIN: normal with no rash, erythema, bruising, or swelling    Allergies:  Review of patient's allergies indicates:  No Known Allergies    Medications:    Current Outpatient Medications:     amLODIPine (NORVASC) 5 MG tablet, Take 1 tablet (5 mg total) by mouth Daily., Disp: 90 tablet, Rfl: 0    atorvastatin (LIPITOR) 20 MG tablet, TAKE 1 TABLET(20 MG) BY MOUTH DAILY, Disp: 90 tablet, Rfl: 0    carvedilol (COREG) 3.125 MG tablet, Take 3.125 mg by mouth Daily., Disp: , Rfl:     carvedilol (COREG) 6.25 MG tablet, TAKE 1 TABLET(6.25 MG) BY MOUTH DAILY, Disp: 90 tablet, Rfl: 0    CREON CpDR, TK 1 C PO TID WC, Disp: , Rfl: 3     fenofibrate (TRICOR) 48 MG tablet, Take 1 tablet (48 mg total) by mouth Daily., Disp: 90 tablet, Rfl: 0    hydrALAZINE (APRESOLINE) 50 MG tablet, TK 1 T PO Q 12 H, Disp: , Rfl: 0    hydrALAZINE (APRESOLINE) 50 MG tablet, Take 50 mg by mouth., Disp: , Rfl:     magnesium oxide (MAG-OX) 400 mg tablet, Take 1 tablet by mouth once daily., Disp: , Rfl: 3    omeprazole (PRILOSEC) 40 MG capsule, omeprazole 40 mg capsule,delayed release, Disp: , Rfl:     pantoprazole (PROTONIX) 40 MG tablet, TK 1 T PO QD, Disp: , Rfl: 0    spironolactone (ALDACTONE) 25 MG tablet, Take 1 tablet by mouth once daily., Disp: , Rfl: 3    PMHx/PSHx Updates:  See patient's last visit with me on 2/11/2019  See H&P on 9/12/2018        Pathology:  Cancer Staging  No matching staging information was found for the patient.    Pancreatic mass biopsy  3/14/2019:    Pancreas, core biopsy:  Scant specimen with mostly blood clot, and few entrapped pancreatic acini and partially preserved, superficial intestinal type mucosal fragments - insufficient for definitive diagnostic assessment.      Objective:     Vitals:  Blood pressure (!) 166/87, pulse 64, temperature 98.4 °F (36.9 °C), temperature source Oral, resp. rate 20, weight 50.6 kg (111 lb 9.6 oz).    Physical Examination:   GEN: no apparent distress, comfortable; AAOx3  HEAD: atraumatic and normocephalic  EYES: no pallor, no icterus, PERRLA  ENT: OMM, no pharyngeal erythema, external ears WNL; no nasal discharge; no thrush  NECK: no masses, thyroid normal, trachea midline, no LAD/LN's, supple  CV: RRR with no murmur; normal pulse; normal S1 and S2; mild pedal edema  CHEST: Normal respiratory effort; CTAB; normal breath sounds; no wheeze or crackles  ABDOM: nontender; soft; normal bowel sounds; no rebound/guarding; mildly distended and improved  MUSC/Skeletal: ROM normal; no crepitus; joints normal; no deformities or arthropathy  EXTREM: no clubbing, cyanosis, inflammation or swelling; prior edema  (pedal) bilat resolved  SKIN: no rashes, lesions, ulcers, petechiae    or subcutaneous nodules  : no peña  NEURO: grossly intact; motor/sensory WNL; AAOx3; no tremors  PSYCH: normal mood, affect and behavior  LYMPH: normal cervical, supraclavicular, axillary and groin LN's            Labs:     2/4/2019 last on chart    Radiology/Diagnostic Studies:    CT Abdom 2/7/2019    IMPRESSION:    1. Poorly defined round slightly heterogeneous focus on early arterial phase imaging in pancreatic head measuring up to 11 mm in size, occurring at site of previous cystic-appearing pancreatic mass on 02/03/2017 MRI. It is likely that  this has not significantly changed in size, although comparison on current CT imaging with that of prior MR imaging is suboptimal. In addition to the previously reported differential diagnosis, cystic islet cell tumor is an additional consideration. Considerations for further evaluation include  endoscopic ultrasound targeted to the pancreatic head with potential tissue sampling of the potential persistent pancreatic head mass and/or repeat pancreatic protocol MRI abdomen without and with IV contrast for more precise comparison with the prior MRI.    2. New enlarged 12 mm celiac axis lymph node could either be reactive in nature due to infectious or inflammatory process or indicate neoplasm such as lymphoma  or metastatic disease. There is no additional evidence of metastatic disease or enlarged lymph nodes however. It is conceivable this too could be reassessed at time of potential endoscopic ultrasound, although if this is unable to be performed, short-term imaging follow-up with either CT or MRI of the abdomen would be helpful to evaluate for stability.    3. Resolution of acute pancreatitis since 07/23/2018, now with few scattered pancreatic parenchymal calcifications which could indicate sequelae of chronic  pancreatitis.    4. Cholelithiasis.    5. Mild bilateral urothelial enhancement can be  seen with urinary tract infection or reflux.    6. 37 mm left ovarian cyst is new since 07/23/2018. This is statistically likely of benign etiology and may be physiologic in nature. Further evaluation with ultrasound pelvis in 12 weeks is suggested to evaluate for resolution.    7. Unchanged 4 mm right middle lobe nodule.      CXR  2/7/2019:    IMPRESSION: No acute intrathoracic abnormality seen              Ct Chest Abdoment Pelvis With Contrast    Result Date: 9/26/2018  EXAMINATION: CT CHEST ABDOMEN PELVIS WITH CONTRAST (XPD) CLINICAL HISTORY: panc mass; Disease of pancreas, unspecified TECHNIQUE: Low dose axial images, sagittal and coronal reformations were obtained from the thoracic inlet to the pubic symphysis following the IV administration of 75 mL of Omnipaque 350 . No oral contrast was administered.  Pancreatic mass protocol was implemented. COMPARISON: No relevant prior. FINDINGS: There is a heterogeneous exophytic left thyroid nodule measuring 2.0 cm.  Recommend thyroid ultrasound for further evaluation. Heart is normal in size.  Pulmonary arteries distribute normally.  Aorta maintains a normal caliber throughout the thorax and abdomen. No mediastinal lymphadenopathy.  Prominent right hilar lymph node noted. The trachea and proximal airways are patent.  The lungs are equally well expanded.  No large consolidative opacity.  There is minimal platelike atelectasis at the right lung base.  No pleural effusion.  No pneumothorax.  There is a small, 0.4 cm nodule within the lateral segment of the right middle lobe (series 3, image 70). The liver appears normal in size.  No focal hepatic lesions.  Portal vein is patent. There are some small calcified gallstones.  No biliary ductal dilatation.  Spleen, adrenal glands, and stomach are unremarkable. In the expected region of the pancreatic head, there is a poorly marginated, subtle area of relative hyper enhancement measuring 1.3 cm, possibly representing the  patient's reported pancreatic head mass.  Remaining pancreatic tissue demonstrates extensive atrophy. Bowel shows no evidence of inflammation or obstruction. Kidneys are normal in size.  There is mild right pelviectasis.  Visualized portions of the ureters are unremarkable.  There is asymmetric right greater than left urinary bladder wall thickening.  Right adnexal hypodensity likely represents an ovarian cyst. Aorta maintains a normal caliber. Soft tissues are unremarkable.  Osseous structures demonstrate no significant abnormality.     In the expected region of the pancreatic head, there is a poorly marginated, inconspicuous area of lesion like enhancement measuring 1.3 cm, possibly representing the patient's reported pancreatic head mass.  Correlation with EUS is recommended. 0.4 cm nodule within the lateral segment of the right middle lobe.  In a low risk patient, no further follow-up is recommended.  In a high-risk patient (history of smoking and/or malignancy) consider follow-up chest CT in 12 months. Heterogeneous, exophytic left thyroid nodule measuring 2.0 cm.  Thyroid ultrasound is recommended for further evaluation. Mild right pelviectasis and asymmetric right urinary bladder wall thickening. Clinical correlation recommended. Probable right ovarian cyst. This report was flagged in Epic as abnormal. Electronically signed by resident: Isacc Jackson Date:    09/26/2018 Time:    11:21 Electronically signed by: Rambo Childers MD Date:    09/26/2018 Time:    11:54      I have reviewed all available lab results and radiology reports.    Assessment/Plan:   (1) 53 y.o. female with diagnosis of pancreatic mass in head of pancrease seen on Ultrasonography on 8/5  - she is followed by Dr Vanessa with GI  - she saw Dr Corrigan/Dr Cummings per my request, had EUS, and she reports that he was unable to identify any pancreatic mass but she had some inflammation.   - she saw Dr Enriquez with Surg-Onc at [facility] on  9/26/2018 and he was awaiting reports from Dr Cummings  - she saw Dr Vanessa with GI on 12/4/2018  - she had last scans on 2/7/2019, and was supposed to get MRI but did not have it done  - she had pancreatic mass biopsy on 4/14/2019 with Dr Cummings which appears to have been nondiagnostic     (2) Alcoholic liver disease/cirrhosis and prior alcoholic pancreatitis  - she has been sober for several months     (3) HTN and hypercholesterolemia     (4) prior stomach ulcers     (5) NCNC anemia, leucopenia and mild thrombocytopenia - most likely related to her liver disease  - latest hgb 13.7 and WNL    (6) Noncompliance issues which hinders my ability to provide her with care    (7) Left ovarian cyst                    Thrombocytopenia    Leukopenia, unspecified type    Normochromic normocytic anemia          PLAN:  1.  F/u with GI and hepatology - she is seeing Dr Cummings next week - will follow there lead   2.  Encouraged compliance  3.  Previously recommended a repeat MRI of abdom with pancreatic protocol in 3 months per radiology's recommendations but patient did not have it done - she is seeing Dr Cummings next week, if they want an MRI, I will let them order it  4. Check up to date labs  5. RTC in 3 months         Fax note to Shekhar Vanessa Bolton, V. Joshi,  Rut Do    Discussion:       I spent over 25 mins of time with the patient. Reviewed results of the recently ordered labs, tests and studies; made directives with regards to the results. Over half of this time was spent couseling and coordinating care.    I have explained all of the above in detail and the patient understands all of the current recommendation(s). I have answered all of their questions to the best of my ability and to their complete satisfaction.   The patient is to continue with the current management plan.              Electronically signed by Manjinder Christy MD

## 2019-08-15 ENCOUNTER — LAB VISIT (OUTPATIENT)
Dept: LAB | Facility: HOSPITAL | Age: 54
End: 2019-08-15
Attending: INTERNAL MEDICINE
Payer: MEDICARE

## 2019-08-15 ENCOUNTER — OFFICE VISIT (OUTPATIENT)
Dept: HEMATOLOGY/ONCOLOGY | Facility: CLINIC | Age: 54
End: 2019-08-15
Payer: MEDICARE

## 2019-08-15 VITALS
RESPIRATION RATE: 20 BRPM | HEART RATE: 64 BPM | SYSTOLIC BLOOD PRESSURE: 166 MMHG | DIASTOLIC BLOOD PRESSURE: 87 MMHG | TEMPERATURE: 98 F | BODY MASS INDEX: 19.16 KG/M2 | WEIGHT: 111.63 LBS

## 2019-08-15 DIAGNOSIS — D72.819 LEUKOPENIA, UNSPECIFIED TYPE: ICD-10-CM

## 2019-08-15 DIAGNOSIS — D69.6 THROMBOCYTOPENIA: ICD-10-CM

## 2019-08-15 DIAGNOSIS — D64.9 NORMOCHROMIC NORMOCYTIC ANEMIA: ICD-10-CM

## 2019-08-15 DIAGNOSIS — D69.6 THROMBOCYTOPENIA: Primary | ICD-10-CM

## 2019-08-15 LAB
ALBUMIN SERPL BCP-MCNC: 4.3 G/DL (ref 3.5–5.2)
ALP SERPL-CCNC: 53 U/L (ref 55–135)
ALT SERPL W/O P-5'-P-CCNC: 14 U/L (ref 10–44)
ANION GAP SERPL CALC-SCNC: 11 MMOL/L (ref 8–16)
AST SERPL-CCNC: 23 U/L (ref 10–40)
BASOPHILS # BLD AUTO: 0.06 K/UL (ref 0–0.2)
BASOPHILS NFR BLD: 0.9 % (ref 0–1.9)
BILIRUB SERPL-MCNC: 0.5 MG/DL (ref 0.1–1)
BUN SERPL-MCNC: 7 MG/DL (ref 6–20)
CALCIUM SERPL-MCNC: 10.1 MG/DL (ref 8.7–10.5)
CHLORIDE SERPL-SCNC: 102 MMOL/L (ref 95–110)
CO2 SERPL-SCNC: 22 MMOL/L (ref 23–29)
CREAT SERPL-MCNC: 0.6 MG/DL (ref 0.5–1.4)
DIFFERENTIAL METHOD: ABNORMAL
EOSINOPHIL # BLD AUTO: 0.1 K/UL (ref 0–0.5)
EOSINOPHIL NFR BLD: 0.9 % (ref 0–8)
ERYTHROCYTE [DISTWIDTH] IN BLOOD BY AUTOMATED COUNT: 12.6 % (ref 11.5–14.5)
EST. GFR  (AFRICAN AMERICAN): >60 ML/MIN/1.73 M^2
EST. GFR  (NON AFRICAN AMERICAN): >60 ML/MIN/1.73 M^2
GLUCOSE SERPL-MCNC: 96 MG/DL (ref 70–110)
HCT VFR BLD AUTO: 34.2 % (ref 37–48.5)
HGB BLD-MCNC: 12.3 G/DL (ref 12–16)
IMM GRANULOCYTES # BLD AUTO: 0.07 K/UL (ref 0–0.04)
IMM GRANULOCYTES NFR BLD AUTO: 1 % (ref 0–0.5)
LYMPHOCYTES # BLD AUTO: 1.9 K/UL (ref 1–4.8)
LYMPHOCYTES NFR BLD: 28.8 % (ref 18–48)
MCH RBC QN AUTO: 34.6 PG (ref 27–31)
MCHC RBC AUTO-ENTMCNC: 36 G/DL (ref 32–36)
MCV RBC AUTO: 96 FL (ref 82–98)
MONOCYTES # BLD AUTO: 1 K/UL (ref 0.3–1)
MONOCYTES NFR BLD: 14.5 % (ref 4–15)
NEUTROPHILS # BLD AUTO: 3.6 K/UL (ref 1.8–7.7)
NEUTROPHILS NFR BLD: 53.9 % (ref 38–73)
NRBC BLD-RTO: 0 /100 WBC
PLATELET # BLD AUTO: 224 K/UL (ref 150–350)
PMV BLD AUTO: 8.5 FL (ref 9.2–12.9)
POTASSIUM SERPL-SCNC: 4 MMOL/L (ref 3.5–5.1)
PROT SERPL-MCNC: 8.1 G/DL (ref 6–8.4)
RBC # BLD AUTO: 3.56 M/UL (ref 4–5.4)
SODIUM SERPL-SCNC: 135 MMOL/L (ref 136–145)
WBC # BLD AUTO: 6.69 K/UL (ref 3.9–12.7)

## 2019-08-15 PROCEDURE — 99213 OFFICE O/P EST LOW 20 MIN: CPT | Mod: S$GLB,,, | Performed by: INTERNAL MEDICINE

## 2019-08-15 PROCEDURE — 85025 COMPLETE CBC W/AUTO DIFF WBC: CPT

## 2019-08-15 PROCEDURE — 99213 PR OFFICE/OUTPT VISIT, EST, LEVL III, 20-29 MIN: ICD-10-PCS | Mod: S$GLB,,, | Performed by: INTERNAL MEDICINE

## 2019-08-15 PROCEDURE — 36415 COLL VENOUS BLD VENIPUNCTURE: CPT

## 2019-08-15 PROCEDURE — 80053 COMPREHEN METABOLIC PANEL: CPT

## 2019-08-15 RX ORDER — OMEPRAZOLE 40 MG/1
40 CAPSULE, DELAYED RELEASE ORAL EVERY MORNING
COMMUNITY

## 2019-08-15 RX ORDER — HYDRALAZINE HYDROCHLORIDE 50 MG/1
50 TABLET, FILM COATED ORAL
COMMUNITY

## 2019-10-03 ENCOUNTER — OFFICE VISIT (OUTPATIENT)
Dept: GASTROENTEROLOGY | Facility: CLINIC | Age: 54
End: 2019-10-03
Payer: MEDICARE

## 2019-10-03 VITALS
HEART RATE: 84 BPM | DIASTOLIC BLOOD PRESSURE: 78 MMHG | WEIGHT: 119 LBS | HEIGHT: 64 IN | BODY MASS INDEX: 20.32 KG/M2 | SYSTOLIC BLOOD PRESSURE: 106 MMHG | TEMPERATURE: 99 F

## 2019-10-03 DIAGNOSIS — D64.9 ANEMIA, UNSPECIFIED TYPE: ICD-10-CM

## 2019-10-03 DIAGNOSIS — K74.60 HEPATIC CIRRHOSIS, UNSPECIFIED HEPATIC CIRRHOSIS TYPE, UNSPECIFIED WHETHER ASCITES PRESENT: ICD-10-CM

## 2019-10-03 DIAGNOSIS — K86.89 PANCREATIC MASS: ICD-10-CM

## 2019-10-03 DIAGNOSIS — K70.9 ALCOHOLIC LIVER DISEASE: Primary | ICD-10-CM

## 2019-10-03 DIAGNOSIS — K86.1 CHRONIC PANCREATITIS, UNSPECIFIED PANCREATITIS TYPE: ICD-10-CM

## 2019-10-03 DIAGNOSIS — K21.9 GASTROESOPHAGEAL REFLUX DISEASE, ESOPHAGITIS PRESENCE NOT SPECIFIED: ICD-10-CM

## 2019-10-03 PROCEDURE — 3078F PR MOST RECENT DIASTOLIC BLOOD PRESSURE < 80 MM HG: ICD-10-PCS | Mod: S$GLB,,, | Performed by: INTERNAL MEDICINE

## 2019-10-03 PROCEDURE — 3008F PR BODY MASS INDEX (BMI) DOCUMENTED: ICD-10-PCS | Mod: S$GLB,,, | Performed by: INTERNAL MEDICINE

## 2019-10-03 PROCEDURE — 3074F SYST BP LT 130 MM HG: CPT | Mod: S$GLB,,, | Performed by: INTERNAL MEDICINE

## 2019-10-03 PROCEDURE — 3074F PR MOST RECENT SYSTOLIC BLOOD PRESSURE < 130 MM HG: ICD-10-PCS | Mod: S$GLB,,, | Performed by: INTERNAL MEDICINE

## 2019-10-03 PROCEDURE — 99215 OFFICE O/P EST HI 40 MIN: CPT | Mod: S$GLB,,, | Performed by: INTERNAL MEDICINE

## 2019-10-03 PROCEDURE — 3078F DIAST BP <80 MM HG: CPT | Mod: S$GLB,,, | Performed by: INTERNAL MEDICINE

## 2019-10-03 PROCEDURE — 3008F BODY MASS INDEX DOCD: CPT | Mod: S$GLB,,, | Performed by: INTERNAL MEDICINE

## 2019-10-03 PROCEDURE — 99215 PR OFFICE/OUTPT VISIT, EST, LEVL V, 40-54 MIN: ICD-10-PCS | Mod: S$GLB,,, | Performed by: INTERNAL MEDICINE

## 2019-10-03 NOTE — PROGRESS NOTES
[facility] Established Patient Visit    Subjective:           PCP: Chrissy Vanessa    Chief Complaint: Follow-up and Cirrhosis       HPI:  Fariba Gamez is a 53 y.o. female here for evaluation of medical problems, liver disease, thrombocytopenia, leukopenia, pancreatic insufficiency, GERD, chronic pancreatitis.  Patient states that she unfortunately started drinking and needs help will refer patient to Dr. Mccord, reviewed  lab work looks reasonably well    ROS:   Constitutional: No fevers, chills, weight loss [place] get  ENT: No allergies, sore throat, rhinorrhea  CV: No chest pain, palpitations, edema  Pulm: No cough, shortness of breath, whee of her medical problems and needs  Ophtho: No vision changes  GI: No blood in stools, change in bowel habits, nausea/vomiting liver disease, cirrhosis, thrombocytopenia, pancreatic insufficiency, GERD, pancreatitis, BMI 20.4  Denies alternating diarrhea/constipation.   Derm: No rash  Heme: No easy bruising or lymphadenopathy  MSK: No arthralgias or myalgias  : No dysuria, hematuria, frequency, polyuria, or flank tenderness  Endo: No hot or cold intolerance  Neuro: No syncope or seizure, or dizziness  Psych: No hallucination, depression or suicidal ideation      Medical History:  has a past medical history of Alcohol induced acute pancreatitis, Alcoholic cirrhosis of liver without ascites, Alcoholic liver disease, Gastric ulcer, unspecified as acute or chronic, without hemorrhage or perforation, Hyperlipidemia, Hypertension, Left-sided low back pain with left-sided sciatica, Leucopenia (9/12/2018), Normochromic normocytic anemia (9/12/2018), Pancreatic cancer (9/12/2018), Pancreatic mass (9/12/2018), Pancreatitis, Stomach ulcer, Thrombocytopenia (9/12/2018), and Unspecified cirrhosis of liver.      Surgical History:  has no past surgical history on file.    Family History:   Family History   Problem Relation Age of Onset    Hypertension Mother      "Kidney failure Mother     Hypertension Father     Heart attack Father        Social History:   Social History     Tobacco Use    Smoking status: Never Smoker    Smokeless tobacco: Never Used   Substance Use Topics    Alcohol use: Yes     Comment: socially    Drug use: Yes     Frequency: 7.0 times per week     Types: Marijuana     Comment: daily       The patient's social and family histories were reviewed by me and updated in the appropriate section of the electronic medical record.    Allergies: Review of patient's allergies indicates:  No Known Allergies      Medications:   Current Outpatient Medications   Medication Sig Dispense Refill    amLODIPine (NORVASC) 5 MG tablet Take 1 tablet (5 mg total) by mouth Daily. 90 tablet 0    atorvastatin (LIPITOR) 20 MG tablet TAKE 1 TABLET(20 MG) BY MOUTH DAILY 90 tablet 0    carvedilol (COREG) 3.125 MG tablet Take 3.125 mg by mouth Daily.      carvedilol (COREG) 6.25 MG tablet TAKE 1 TABLET(6.25 MG) BY MOUTH DAILY 90 tablet 0    CREON CpDR TK 1 C PO TID WC  3    fenofibrate (TRICOR) 48 MG tablet Take 1 tablet (48 mg total) by mouth Daily. 90 tablet 0    hydrALAZINE (APRESOLINE) 50 MG tablet TK 1 T PO Q 12 H  0    hydrALAZINE (APRESOLINE) 50 MG tablet Take 50 mg by mouth.      magnesium oxide (MAG-OX) 400 mg tablet Take 1 tablet by mouth once daily.  3    omeprazole (PRILOSEC) 40 MG capsule omeprazole 40 mg capsule,delayed release      pantoprazole (PROTONIX) 40 MG tablet TK 1 T PO QD  0    spironolactone (ALDACTONE) 25 MG tablet Take 1 tablet by mouth once daily.  3     No current facility-administered medications for this visit.      All medications and past history have been reviewed.        Objective:        Vital Signs:  Blood pressure 106/78, pulse 84, temperature 99 °F (37.2 °C), height 5' 4" (1.626 m), weight 54 kg (119 lb). Body mass index is 20.43 kg/m².    Physical Exam:   General Appearance: Well appearing in no acute distress, well developed, " well                nourished  Head: Normocephalic, without obvious abnormality  Eyes:  Pupils equal, round and reactive to light  Throat: Lips, mucosa, and tongue normal; teeth and gums normal  Lungs: CTA bilaterally in anterior and posterior fields, no wheezes, no crackles  Heart:  Regular rate and rhythm, no murmurs heard  Abdomen: Soft, non tender, non distended with positive bowel sounds in all four quadrants. No hepatosplenomegaly, ascites, or mass. Negative for succusion splash cirrhosis  : female   Extremities: No cyanosis, edema or deformity  Skin: No rash  Neurologic: Normal exam  Psychiatric: alcohol abuse    Labs:  Lab Results   Component Value Date    WBC 6.69 08/15/2019    HGB 12.3 08/15/2019    HCT 34.2 (L) 08/15/2019     08/15/2019    ALT 14 08/15/2019    AST 23 08/15/2019     (L) 08/15/2019    K 4.0 08/15/2019     08/15/2019    CREATININE 0.6 08/15/2019    BUN 7 08/15/2019    CO2 22 (L) 08/15/2019    INR 1.1 07/27/2018       Imaging:     All lab results and imaging results have been reviewed and discussed with the patient      Assessment:       1. Alcoholic liver disease    2. Anemia, unspecified type    3. Gastroesophageal reflux disease, esophagitis presence not specified    4. Pancreatic mass    5. Chronic pancreatitis, unspecified pancreatitis type    6. Hepatic cirrhosis, unspecified hepatic cirrhosis type, unspecified whether ascites present    7. BMI 20.0-20.9, adult        Plan:       Fariba was seen today for follow-up and cirrhosis.    Diagnoses and all orders for this visit:    Alcoholic liver disease    Anemia, unspecified type    Gastroesophageal reflux disease, esophagitis presence not specified    Pancreatic mass    Chronic pancreatitis, unspecified pancreatitis type    Hepatic cirrhosis, unspecified hepatic cirrhosis type, unspecified whether ascites present    BMI 20.0-20.9, adult        See HPI    Follow up in about 3 months (around 1/3/2020), or if symptoms  worsen or fail to improve.    The plan was discussed with the patient and all questions/concerns have been answered to the patient's satisfaction.        Electronically signed by Chrissy Vanessa MD    This note was dictated using voice recognition software and may contain grammatical errors.

## 2019-11-13 ENCOUNTER — TELEPHONE (OUTPATIENT)
Dept: HEMATOLOGY/ONCOLOGY | Facility: CLINIC | Age: 54
End: 2019-11-13

## 2019-11-13 NOTE — TELEPHONE ENCOUNTER
Called to see if the pt had any labs done prior to f/u appt.    Pt VU labs to be done @ [facility]

## 2019-11-14 ENCOUNTER — LAB VISIT (OUTPATIENT)
Dept: LAB | Facility: HOSPITAL | Age: 54
End: 2019-11-14
Attending: INTERNAL MEDICINE
Payer: MEDICARE

## 2019-11-14 ENCOUNTER — OFFICE VISIT (OUTPATIENT)
Dept: HEMATOLOGY/ONCOLOGY | Facility: CLINIC | Age: 54
End: 2019-11-14
Payer: MEDICARE

## 2019-11-14 VITALS
TEMPERATURE: 99 F | RESPIRATION RATE: 18 BRPM | WEIGHT: 124.69 LBS | DIASTOLIC BLOOD PRESSURE: 92 MMHG | HEART RATE: 71 BPM | SYSTOLIC BLOOD PRESSURE: 150 MMHG | BODY MASS INDEX: 21.4 KG/M2

## 2019-11-14 DIAGNOSIS — D64.9 NORMOCHROMIC NORMOCYTIC ANEMIA: ICD-10-CM

## 2019-11-14 DIAGNOSIS — D69.6 THROMBOCYTOPENIA: ICD-10-CM

## 2019-11-14 DIAGNOSIS — D72.819 LEUKOPENIA, UNSPECIFIED TYPE: ICD-10-CM

## 2019-11-14 DIAGNOSIS — D72.819 LEUKOPENIA, UNSPECIFIED TYPE: Primary | ICD-10-CM

## 2019-11-14 DIAGNOSIS — K86.89 PANCREATIC MASS: ICD-10-CM

## 2019-11-14 LAB
ALBUMIN SERPL BCP-MCNC: 4.7 G/DL (ref 3.5–5.2)
ALP SERPL-CCNC: 54 U/L (ref 55–135)
ALT SERPL W/O P-5'-P-CCNC: 14 U/L (ref 10–44)
ANION GAP SERPL CALC-SCNC: 10 MMOL/L (ref 8–16)
AST SERPL-CCNC: 21 U/L (ref 10–40)
BASOPHILS # BLD AUTO: 0.04 K/UL (ref 0–0.2)
BASOPHILS NFR BLD: 0.7 % (ref 0–1.9)
BILIRUB SERPL-MCNC: 1.2 MG/DL (ref 0.1–1)
BUN SERPL-MCNC: 11 MG/DL (ref 6–20)
CALCIUM SERPL-MCNC: 9.7 MG/DL (ref 8.7–10.5)
CHLORIDE SERPL-SCNC: 104 MMOL/L (ref 95–110)
CO2 SERPL-SCNC: 22 MMOL/L (ref 23–29)
CREAT SERPL-MCNC: 0.8 MG/DL (ref 0.5–1.4)
DIFFERENTIAL METHOD: ABNORMAL
EOSINOPHIL # BLD AUTO: 0.1 K/UL (ref 0–0.5)
EOSINOPHIL NFR BLD: 2.1 % (ref 0–8)
ERYTHROCYTE [DISTWIDTH] IN BLOOD BY AUTOMATED COUNT: 12.8 % (ref 11.5–14.5)
EST. GFR  (AFRICAN AMERICAN): >60 ML/MIN/1.73 M^2
EST. GFR  (NON AFRICAN AMERICAN): >60 ML/MIN/1.73 M^2
GLUCOSE SERPL-MCNC: 95 MG/DL (ref 70–110)
HCT VFR BLD AUTO: 36.7 % (ref 37–48.5)
HGB BLD-MCNC: 13 G/DL (ref 12–16)
IMM GRANULOCYTES # BLD AUTO: 0.02 K/UL (ref 0–0.04)
IMM GRANULOCYTES NFR BLD AUTO: 0.3 % (ref 0–0.5)
LYMPHOCYTES # BLD AUTO: 2 K/UL (ref 1–4.8)
LYMPHOCYTES NFR BLD: 31.8 % (ref 18–48)
MCH RBC QN AUTO: 34.3 PG (ref 27–31)
MCHC RBC AUTO-ENTMCNC: 35.4 G/DL (ref 32–36)
MCV RBC AUTO: 97 FL (ref 82–98)
MONOCYTES # BLD AUTO: 0.6 K/UL (ref 0.3–1)
MONOCYTES NFR BLD: 10.3 % (ref 4–15)
NEUTROPHILS # BLD AUTO: 3.4 K/UL (ref 1.8–7.7)
NEUTROPHILS NFR BLD: 54.8 % (ref 38–73)
NRBC BLD-RTO: 0 /100 WBC
PLATELET # BLD AUTO: 226 K/UL (ref 150–350)
PMV BLD AUTO: 8.3 FL (ref 9.2–12.9)
POTASSIUM SERPL-SCNC: 3.2 MMOL/L (ref 3.5–5.1)
PROT SERPL-MCNC: 8.1 G/DL (ref 6–8.4)
RBC # BLD AUTO: 3.79 M/UL (ref 4–5.4)
SODIUM SERPL-SCNC: 136 MMOL/L (ref 136–145)
WBC # BLD AUTO: 6.13 K/UL (ref 3.9–12.7)

## 2019-11-14 PROCEDURE — 99213 PR OFFICE/OUTPT VISIT, EST, LEVL III, 20-29 MIN: ICD-10-PCS | Mod: S$GLB,,, | Performed by: INTERNAL MEDICINE

## 2019-11-14 PROCEDURE — 85025 COMPLETE CBC W/AUTO DIFF WBC: CPT

## 2019-11-14 PROCEDURE — 3008F BODY MASS INDEX DOCD: CPT | Mod: S$GLB,,, | Performed by: INTERNAL MEDICINE

## 2019-11-14 PROCEDURE — 3008F PR BODY MASS INDEX (BMI) DOCUMENTED: ICD-10-PCS | Mod: S$GLB,,, | Performed by: INTERNAL MEDICINE

## 2019-11-14 PROCEDURE — 3077F PR MOST RECENT SYSTOLIC BLOOD PRESSURE >= 140 MM HG: ICD-10-PCS | Mod: S$GLB,,, | Performed by: INTERNAL MEDICINE

## 2019-11-14 PROCEDURE — 99213 OFFICE O/P EST LOW 20 MIN: CPT | Mod: S$GLB,,, | Performed by: INTERNAL MEDICINE

## 2019-11-14 PROCEDURE — 80053 COMPREHEN METABOLIC PANEL: CPT

## 2019-11-14 PROCEDURE — 3080F DIAST BP >= 90 MM HG: CPT | Mod: S$GLB,,, | Performed by: INTERNAL MEDICINE

## 2019-11-14 PROCEDURE — 36415 COLL VENOUS BLD VENIPUNCTURE: CPT

## 2019-11-14 PROCEDURE — 3080F PR MOST RECENT DIASTOLIC BLOOD PRESSURE >= 90 MM HG: ICD-10-PCS | Mod: S$GLB,,, | Performed by: INTERNAL MEDICINE

## 2019-11-14 PROCEDURE — 3077F SYST BP >= 140 MM HG: CPT | Mod: S$GLB,,, | Performed by: INTERNAL MEDICINE

## 2019-11-15 ENCOUNTER — TELEPHONE (OUTPATIENT)
Dept: HEMATOLOGY/ONCOLOGY | Facility: CLINIC | Age: 54
End: 2019-11-15

## 2019-12-05 ENCOUNTER — HOSPITAL ENCOUNTER (OUTPATIENT)
Dept: RADIOLOGY | Facility: HOSPITAL | Age: 54
Discharge: HOME | End: 2019-12-05
Attending: INTERNAL MEDICINE
Payer: MEDICARE

## 2019-12-05 DIAGNOSIS — D69.6 THROMBOCYTOPENIA: ICD-10-CM

## 2019-12-05 DIAGNOSIS — D64.9 NORMOCHROMIC NORMOCYTIC ANEMIA: ICD-10-CM

## 2019-12-05 DIAGNOSIS — D72.819 LEUKOPENIA, UNSPECIFIED TYPE: ICD-10-CM

## 2019-12-05 DIAGNOSIS — K86.89 PANCREATIC MASS: ICD-10-CM

## 2019-12-05 PROCEDURE — 25500020 PHARM REV CODE 255: Mod: PO | Performed by: INTERNAL MEDICINE

## 2019-12-05 PROCEDURE — 74178 CT ABD&PLV WO CNTR FLWD CNTR: CPT | Mod: TC

## 2019-12-05 RX ADMIN — IOHEXOL 100 ML: 350 INJECTION, SOLUTION INTRAVENOUS at 11:12

## 2020-02-12 ENCOUNTER — OFFICE VISIT (OUTPATIENT)
Dept: GASTROENTEROLOGY | Facility: CLINIC | Age: 55
End: 2020-02-12
Payer: MEDICARE

## 2020-02-12 VITALS
BODY MASS INDEX: 21.68 KG/M2 | SYSTOLIC BLOOD PRESSURE: 166 MMHG | WEIGHT: 127 LBS | HEIGHT: 64 IN | OXYGEN SATURATION: 98 % | TEMPERATURE: 98 F | DIASTOLIC BLOOD PRESSURE: 106 MMHG | HEART RATE: 72 BPM

## 2020-02-12 DIAGNOSIS — K74.60 HEPATIC CIRRHOSIS, UNSPECIFIED HEPATIC CIRRHOSIS TYPE, UNSPECIFIED WHETHER ASCITES PRESENT: ICD-10-CM

## 2020-02-12 DIAGNOSIS — K21.9 GASTROESOPHAGEAL REFLUX DISEASE WITHOUT ESOPHAGITIS: ICD-10-CM

## 2020-02-12 DIAGNOSIS — D69.6 THROMBOCYTOPENIA: ICD-10-CM

## 2020-02-12 DIAGNOSIS — K86.1 CHRONIC CALCIFIC PANCREATITIS: ICD-10-CM

## 2020-02-12 DIAGNOSIS — K76.9 LIVER DISEASE: ICD-10-CM

## 2020-02-12 DIAGNOSIS — K86.1 CHRONIC PANCREATITIS, UNSPECIFIED PANCREATITIS TYPE: Primary | ICD-10-CM

## 2020-02-12 PROCEDURE — 3077F PR MOST RECENT SYSTOLIC BLOOD PRESSURE >= 140 MM HG: ICD-10-PCS | Mod: S$GLB,,, | Performed by: INTERNAL MEDICINE

## 2020-02-12 PROCEDURE — 3080F PR MOST RECENT DIASTOLIC BLOOD PRESSURE >= 90 MM HG: ICD-10-PCS | Mod: S$GLB,,, | Performed by: INTERNAL MEDICINE

## 2020-02-12 PROCEDURE — 99215 PR OFFICE/OUTPT VISIT, EST, LEVL V, 40-54 MIN: ICD-10-PCS | Mod: S$GLB,,, | Performed by: INTERNAL MEDICINE

## 2020-02-12 PROCEDURE — 99215 OFFICE O/P EST HI 40 MIN: CPT | Mod: S$GLB,,, | Performed by: INTERNAL MEDICINE

## 2020-02-12 PROCEDURE — 3077F SYST BP >= 140 MM HG: CPT | Mod: S$GLB,,, | Performed by: INTERNAL MEDICINE

## 2020-02-12 PROCEDURE — 3080F DIAST BP >= 90 MM HG: CPT | Mod: S$GLB,,, | Performed by: INTERNAL MEDICINE

## 2020-02-12 PROCEDURE — 3008F PR BODY MASS INDEX (BMI) DOCUMENTED: ICD-10-PCS | Mod: S$GLB,,, | Performed by: INTERNAL MEDICINE

## 2020-02-12 PROCEDURE — 3008F BODY MASS INDEX DOCD: CPT | Mod: S$GLB,,, | Performed by: INTERNAL MEDICINE

## 2020-02-12 NOTE — PROGRESS NOTES
[facility] Established Patient Visit    Subjective:           PCP: Chrissy Vanessa    Chief Complaint: Pancreatic mass       HPI:  Fariba Gamez is a 54 y.o. female here for   evaluation of her abdominal pain, patient has chronic CT pancreatitis, denies dysphagia or odynophagia or early satiety hematemesis fever chills or rigors, no lower GI symptoms, no nausea vomiting, going to have a repeat EUS in the next few months, will review labs from ,  get EKG no other fresh objective findings, detailed examination done. Takes Protonix and Creon. Has significant medical history of pancreatic mass, calcific pancreatitis, thrombocytopenia, liver disease, gastric ulcer, cirrhosis of liver.    ROS:   Constitutional: No fevers, chills, weight loss  ENT: No allergies, sore throat, rhinorrhea  CV: No chest pain, palpitations, edema  Pulm: No cough, shortness of breath, wheezing  Ophtho: No vision changes  GI: No blood in stools, change in bowel habits, nausea/vomiting chronic pancreatitis, liver disease, cirrhosis  Denies alternating diarrhea/constipation.   Derm: No rash  Heme: No easy bruising or lymphadenopathy  MSK: No arthralgias or myalgias  : No dysuria, hematuria, frequency, polyuria, or flank tenderness  Endo: No hot or cold intolerance  Neuro: No syncope or seizure, or dizziness  Psych: No hallucination, depression or suicidal ideation      Medical History:  has a past medical history of Alcohol induced acute pancreatitis, Alcoholic cirrhosis of liver without ascites, Alcoholic liver disease, Gastric ulcer, unspecified as acute or chronic, without hemorrhage or perforation, Hyperlipidemia, Hypertension, Left-sided low back pain with left-sided sciatica, Leucopenia (9/12/2018), Normochromic normocytic anemia (9/12/2018), Pancreatic cancer (9/12/2018), Pancreatic mass (9/12/2018), Pancreatitis, Stomach ulcer, Thrombocytopenia (9/12/2018), and Unspecified cirrhosis of liver.      Surgical  History:  has no past surgical history on file.    Family History:   Family History   Problem Relation Age of Onset    Hypertension Mother     Kidney failure Mother     Hypertension Father     Heart attack Father        Social History:   Social History     Tobacco Use    Smoking status: Never Smoker    Smokeless tobacco: Never Used   Substance Use Topics    Alcohol use: Yes     Comment: socially    Drug use: Yes     Frequency: 7.0 times per week     Types: Marijuana     Comment: daily       The patient's social and family histories were reviewed by me and updated in the appropriate section of the electronic medical record.    Allergies: Review of patient's allergies indicates:  No Known Allergies      Medications:   Current Outpatient Medications   Medication Sig Dispense Refill    amLODIPine (NORVASC) 5 MG tablet Take 1 tablet (5 mg total) by mouth Daily. 90 tablet 0    atorvastatin (LIPITOR) 20 MG tablet TAKE 1 TABLET(20 MG) BY MOUTH DAILY 90 tablet 0    carvedilol (COREG) 3.125 MG tablet Take 3.125 mg by mouth Daily.      carvedilol (COREG) 6.25 MG tablet TAKE 1 TABLET(6.25 MG) BY MOUTH DAILY 90 tablet 0    CREON CpDR TK 1 C PO TID WC  3    fenofibrate (TRICOR) 48 MG tablet Take 1 tablet (48 mg total) by mouth Daily. 90 tablet 0    hydrALAZINE (APRESOLINE) 50 MG tablet TK 1 T PO Q 12 H  0    hydrALAZINE (APRESOLINE) 50 MG tablet Take 50 mg by mouth.      magnesium oxide (MAG-OX) 400 mg tablet Take 1 tablet by mouth once daily.  3    omeprazole (PRILOSEC) 40 MG capsule omeprazole 40 mg capsule,delayed release      pantoprazole (PROTONIX) 40 MG tablet TK 1 T PO QD  0    spironolactone (ALDACTONE) 25 MG tablet Take 1 tablet by mouth once daily.  3     No current facility-administered medications for this visit.      All medications and past history have been reviewed.        Objective:        Vital Signs:  Blood pressure (!) 166/106, pulse 72, temperature 98.2 °F (36.8 °C), temperature source  "Temporal, height 5' 4" (1.626 m), weight 57.6 kg (127 lb), SpO2 98 %. Body mass index is 21.8 kg/m².    Physical Exam:   General Appearance: Well appearing in no acute distress, well developed, well  nourished  Head: Normocephalic, without obvious abnormality  Eyes:  Pupils equal, round and reactive to light  Throat: Lips, mucosa, and tongue normal; teeth and gums normal  Lungs: CTA bilaterally in anterior and posterior fields, no wheezes, no crackles  Heart:  Regular rate and rhythm, no murmurs heard  Abdomen: Soft, non tender, non distended with positive bowel sounds in all four quadrants. No hepatosplenomegaly, ascites, or mass. Negative for succusion splash  : female   Extremities: No cyanosis, edema or deformity  Skin: No rash  Neurologic: Normal exam    Labs:  Lab Results   Component Value Date    WBC 6.13 11/14/2019    HGB 13.0 11/14/2019    HCT 36.7 (L) 11/14/2019     11/14/2019    ALT 14 11/14/2019    AST 21 11/14/2019     11/14/2019    K 3.2 (L) 11/14/2019     11/14/2019    CREATININE 0.8 11/14/2019    BUN 11 11/14/2019    CO2 22 (L) 11/14/2019    INR 1.1 07/27/2018       Imaging:     All lab results and imaging results have been reviewed and discussed with the patient      Assessment:       1. Chronic pancreatitis, unspecified pancreatitis type    2. Hepatic cirrhosis, unspecified hepatic cirrhosis type, unspecified whether ascites present    3. Gastroesophageal reflux disease without esophagitis    4. Chronic calcific pancreatitis    5. Thrombocytopenia    6. Liver disease    7. BMI 21.0-21.9, adult        Plan:       Fariba was seen today for pancreatic mass.    Diagnoses and all orders for this visit:    Chronic pancreatitis, unspecified pancreatitis type    Hepatic cirrhosis, unspecified hepatic cirrhosis type, unspecified whether ascites present    Gastroesophageal reflux disease without esophagitis    Chronic calcific pancreatitis    Thrombocytopenia    Liver disease    BMI " 21.0-21.9, adult        See HPI    Follow up if symptoms worsen or fail to improve.    The plan was discussed with the patient and all questions/concerns have been answered to the patient's satisfaction.        Electronically signed by Chrissy Vanessa MD    This note was dictated using voice recognition software and may contain grammatical errors.

## 2020-02-12 NOTE — PROGRESS NOTES
[facility] Hematology/Oncology  PROGRESS NOTE       Subjective:       Patient ID:   NAME: Fariba Gamez : 1965     54 y.o. female    Referring Doc: Rut  Other Physicians: Otf; Shekhar Do, Natty Cummings    Chief Complaint:  Pancreatic mass f/u    History of Present Illness:     Patient returns today for a regularly scheduled follow-up visit.  The patient is here today to go over the results of the recently ordered labs, tests and studies.  She reports that she has been feeling great and has been eating well. She denies any CP, SOB, Ha's or N/V. She is here with her daughtercon.     She previously had had a pancreatic mass biopsy with Dr Cummings at [facility] on 3/14/2019. She saw Dr Cummings last in 2019. She sees them again in next month for a repeat scope. She had repeat Ct done on 2019. She saw Dr Vanessa for f/u just yesterday.        ROS:   GEN: normal without any fever, night sweats or weight loss  HEENT: normal with no HA's, sore throat, stiff neck, changes in vision  CV: normal with no CP, SOB, PND, MARTIN or orthopnea  PULM: normal with no SOB, cough, hemoptysis, sputum or pleuritic pain  GI: normal with no abdominal pain, nausea, vomiting, constipation, diarrhea, melanotic stools, BRBPR, or hematemesis  : normal with no hematuria, dysuria  BREAST: normal with no mass, discharge, pain  SKIN: normal with no rash, erythema, bruising, or swelling    Allergies:  Review of patient's allergies indicates:  No Known Allergies    Medications:    Current Outpatient Medications:     amLODIPine (NORVASC) 5 MG tablet, Take 1 tablet (5 mg total) by mouth Daily., Disp: 90 tablet, Rfl: 0    atorvastatin (LIPITOR) 20 MG tablet, TAKE 1 TABLET(20 MG) BY MOUTH DAILY, Disp: 90 tablet, Rfl: 0    carvedilol (COREG) 3.125 MG tablet, Take 3.125 mg by mouth Daily., Disp: , Rfl:     carvedilol (COREG) 6.25 MG tablet, TAKE 1 TABLET(6.25 MG) BY MOUTH DAILY, Disp: 90 tablet, Rfl: 0    CREON CpDR, TK 1 C PO TID  WC, Disp: , Rfl: 3    fenofibrate (TRICOR) 48 MG tablet, Take 1 tablet (48 mg total) by mouth Daily., Disp: 90 tablet, Rfl: 0    hydrALAZINE (APRESOLINE) 50 MG tablet, TK 1 T PO Q 12 H, Disp: , Rfl: 0    hydrALAZINE (APRESOLINE) 50 MG tablet, Take 50 mg by mouth., Disp: , Rfl:     magnesium oxide (MAG-OX) 400 mg tablet, Take 1 tablet by mouth once daily., Disp: , Rfl: 3    omeprazole (PRILOSEC) 40 MG capsule, omeprazole 40 mg capsule,delayed release, Disp: , Rfl:     pantoprazole (PROTONIX) 40 MG tablet, TK 1 T PO QD, Disp: , Rfl: 0    spironolactone (ALDACTONE) 25 MG tablet, Take 1 tablet by mouth once daily., Disp: , Rfl: 3    PMHx/PSHx Updates:  See patient's last visit with me on 11/14/2019  See H&P on 9/12/2018        Pathology:  Cancer Staging  No matching staging information was found for the patient.    Pancreatic mass biopsy  3/14/2019:    Pancreas, core biopsy:  Scant specimen with mostly blood clot, and few entrapped pancreatic acini and partially preserved, superficial intestinal type mucosal fragments - insufficient for definitive diagnostic assessment.      Objective:     Vitals:  Blood pressure (!) 150/97, pulse 68, temperature 98.3 °F (36.8 °C), temperature source Oral, resp. rate 19, weight 57.1 kg (125 lb 12.8 oz).    Physical Examination:   GEN: no apparent distress, comfortable; AAOx3  HEAD: atraumatic and normocephalic  EYES: no pallor, no icterus, PERRLA  ENT: OMM, no pharyngeal erythema, external ears WNL; no nasal discharge; no thrush  NECK: no masses, thyroid normal, trachea midline, no LAD/LN's, supple  CV: RRR with no murmur; normal pulse; normal S1 and S2; mild pedal edema  CHEST: Normal respiratory effort; CTAB; normal breath sounds; no wheeze or crackles  ABDOM: nontender; soft; normal bowel sounds; no rebound/guarding; mildly distended and improved  MUSC/Skeletal: ROM normal; no crepitus; joints normal; no deformities or arthropathy  EXTREM: no clubbing, cyanosis, inflammation  or swelling; prior edema (pedal) bilat resolved  SKIN: no rashes, lesions, ulcers, petechiae    or subcutaneous nodules  : no peña  NEURO: grossly intact; motor/sensory WNL; AAOx3; no tremors  PSYCH: normal mood, affect and behavior  LYMPH: normal cervical, supraclavicular, axillary and groin LN's            Labs:     2/13/2020  Lab Results   Component Value Date    WBC 4.10 02/13/2020    HGB 12.1 02/13/2020    HCT 33.3 (L) 02/13/2020    MCV 96 02/13/2020     02/13/2020       CMP pending      Radiology/Diagnostic Studies:    Ct abdom  12/5/2019:  Impression       1. 11 mm heterogeneously enhancing structure of the head of the pancreas is again identified, slightly less conspicuous when compared with previous exam.  2. There is a new focus of hypoenhancement within the body of the pancreas measuring 9 mm in diameter, which may reflect a dilated side duct, or developing cystic lesion.  3. No interval change of 12 mm celiac axis lymph node.  4. Cholelithiasis.  5. Small hiatal hernia.               CT Abdom 2/7/2019    IMPRESSION:    1. Poorly defined round slightly heterogeneous focus on early arterial phase imaging in pancreatic head measuring up to 11 mm in size, occurring at site of previous cystic-appearing pancreatic mass on 02/03/2017 MRI. It is likely that  this has not significantly changed in size, although comparison on current CT imaging with that of prior MR imaging is suboptimal. In addition to the previously reported differential diagnosis, cystic islet cell tumor is an additional consideration. Considerations for further evaluation include  endoscopic ultrasound targeted to the pancreatic head with potential tissue sampling of the potential persistent pancreatic head mass and/or repeat pancreatic protocol MRI abdomen without and with IV contrast for more precise comparison with the prior MRI.    2. New enlarged 12 mm celiac axis lymph node could either be reactive in nature due to infectious  or inflammatory process or indicate neoplasm such as lymphoma  or metastatic disease. There is no additional evidence of metastatic disease or enlarged lymph nodes however. It is conceivable this too could be reassessed at time of potential endoscopic ultrasound, although if this is unable to be performed, short-term imaging follow-up with either CT or MRI of the abdomen would be helpful to evaluate for stability.    3. Resolution of acute pancreatitis since 07/23/2018, now with few scattered pancreatic parenchymal calcifications which could indicate sequelae of chronic  pancreatitis.    4. Cholelithiasis.    5. Mild bilateral urothelial enhancement can be seen with urinary tract infection or reflux.    6. 37 mm left ovarian cyst is new since 07/23/2018. This is statistically likely of benign etiology and may be physiologic in nature. Further evaluation with ultrasound pelvis in 12 weeks is suggested to evaluate for resolution.    7. Unchanged 4 mm right middle lobe nodule.      CXR  2/7/2019:    IMPRESSION: No acute intrathoracic abnormality seen              Ct Chest Abdoment Pelvis With Contrast    Result Date: 9/26/2018  EXAMINATION: CT CHEST ABDOMEN PELVIS WITH CONTRAST (XPD) CLINICAL HISTORY: panc mass; Disease of pancreas, unspecified TECHNIQUE: Low dose axial images, sagittal and coronal reformations were obtained from the thoracic inlet to the pubic symphysis following the IV administration of 75 mL of Omnipaque 350 . No oral contrast was administered.  Pancreatic mass protocol was implemented. COMPARISON: No relevant prior. FINDINGS: There is a heterogeneous exophytic left thyroid nodule measuring 2.0 cm.  Recommend thyroid ultrasound for further evaluation. Heart is normal in size.  Pulmonary arteries distribute normally.  Aorta maintains a normal caliber throughout the thorax and abdomen. No mediastinal lymphadenopathy.  Prominent right hilar lymph node noted. The trachea and proximal airways are patent.   The lungs are equally well expanded.  No large consolidative opacity.  There is minimal platelike atelectasis at the right lung base.  No pleural effusion.  No pneumothorax.  There is a small, 0.4 cm nodule within the lateral segment of the right middle lobe (series 3, image 70). The liver appears normal in size.  No focal hepatic lesions.  Portal vein is patent. There are some small calcified gallstones.  No biliary ductal dilatation.  Spleen, adrenal glands, and stomach are unremarkable. In the expected region of the pancreatic head, there is a poorly marginated, subtle area of relative hyper enhancement measuring 1.3 cm, possibly representing the patient's reported pancreatic head mass.  Remaining pancreatic tissue demonstrates extensive atrophy. Bowel shows no evidence of inflammation or obstruction. Kidneys are normal in size.  There is mild right pelviectasis.  Visualized portions of the ureters are unremarkable.  There is asymmetric right greater than left urinary bladder wall thickening.  Right adnexal hypodensity likely represents an ovarian cyst. Aorta maintains a normal caliber. Soft tissues are unremarkable.  Osseous structures demonstrate no significant abnormality.     In the expected region of the pancreatic head, there is a poorly marginated, inconspicuous area of lesion like enhancement measuring 1.3 cm, possibly representing the patient's reported pancreatic head mass.  Correlation with EUS is recommended. 0.4 cm nodule within the lateral segment of the right middle lobe.  In a low risk patient, no further follow-up is recommended.  In a high-risk patient (history of smoking and/or malignancy) consider follow-up chest CT in 12 months. Heterogeneous, exophytic left thyroid nodule measuring 2.0 cm.  Thyroid ultrasound is recommended for further evaluation. Mild right pelviectasis and asymmetric right urinary bladder wall thickening. Clinical correlation recommended. Probable right ovarian cyst. This  report was flagged in Epic as abnormal. Electronically signed by resident: Isacc Jackson Date:    09/26/2018 Time:    11:21 Electronically signed by: Rambo Childers MD Date:    09/26/2018 Time:    11:54      I have reviewed all available lab results and radiology reports.    Assessment/Plan:   (1) 54 y.o. female with diagnosis of pancreatic mass in head of pancrease seen on Ultrasonography on 8/5  - she is followed by Dr Vanessa with GI  - she saw Dr Corrigan/Dr Cummings per my request, had EUS, and she reports that he was unable to identify any pancreatic mass but she had some inflammation.   - she saw Dr Enriquez with Surg-Onc at [facility] on 9/26/2018 and he was awaiting reports from Dr Cummings  - she saw Dr Vanessa with GI on 12/4/2018  - she had last scans on 2/7/2019, and was supposed to get MRI but did not have it done  - she had pancreatic mass biopsy on 4/14/2019 with Dr Cummings which appears to have been nondiagnostic  - she saw Dr Cummings in Nov 2019 and Dr Vanessa just yesterday  - she previously never did the recommended MRI that had been scheduled  - repeat CT abdom was done on 12/5/2019  - she sees Dr Cummings again next month for repeat scope     (2) Alcoholic liver disease/cirrhosis and prior alcoholic pancreatitis  - she has been sober for several months     (3) HTN and hypercholesterolemia     (4) prior stomach ulcers     (5) NCNC anemia, leucopenia and mild thrombocytopenia - most likely related to her liver disease  - latest hgb 12.1 and WNL  - wbc 4.1 and also WNL  - platelets are 150,000 and adequate    (6) Noncompliance issues which hinders my ability to provide her with care    (7) Left ovarian cyst                    Thrombocytopenia    Leukopenia, unspecified type    Normochromic normocytic anemia    Pancreatic mass          PLAN:  1.  F/u with GI and hepatology - will follow there lead - she sees Dr Cummings again next month for repeat scope  2.  Encouraged compliance  3.  Previously recommended a  repeat MRI of abdom with pancreatic protocol but she never went and had the test done  4. Fax latest Ct of abdom/pelvis to Dr Cummings and Dr Vanessa  5. Check up to date labs every 3 months  6. RTC in 6 months         Fax note to Shekhar Vanessa Bolton, V. Joshi, Bernstein, [place]    Discussion:       I spent over 25 mins of time with the patient. Reviewed results of the recently ordered labs, tests and studies; made directives with regards to the results. Over half of this time was spent couseling and coordinating care.    I have explained all of the above in detail and the patient understands all of the current recommendation(s). I have answered all of their questions to the best of my ability and to their complete satisfaction.   The patient is to continue with the current management plan.              Electronically signed by Manjinder Christy MD

## 2020-02-13 ENCOUNTER — OFFICE VISIT (OUTPATIENT)
Dept: HEMATOLOGY/ONCOLOGY | Facility: CLINIC | Age: 55
End: 2020-02-13
Payer: MEDICARE

## 2020-02-13 ENCOUNTER — LAB VISIT (OUTPATIENT)
Dept: LAB | Facility: HOSPITAL | Age: 55
End: 2020-02-13
Attending: INTERNAL MEDICINE
Payer: MEDICARE

## 2020-02-13 VITALS
SYSTOLIC BLOOD PRESSURE: 150 MMHG | HEART RATE: 68 BPM | RESPIRATION RATE: 19 BRPM | WEIGHT: 125.81 LBS | BODY MASS INDEX: 21.59 KG/M2 | DIASTOLIC BLOOD PRESSURE: 97 MMHG | TEMPERATURE: 98 F

## 2020-02-13 DIAGNOSIS — D69.6 THROMBOCYTOPENIA: ICD-10-CM

## 2020-02-13 DIAGNOSIS — K86.89 PANCREATIC MASS: ICD-10-CM

## 2020-02-13 DIAGNOSIS — D64.9 NORMOCHROMIC NORMOCYTIC ANEMIA: ICD-10-CM

## 2020-02-13 DIAGNOSIS — D72.819 LEUKOPENIA, UNSPECIFIED TYPE: ICD-10-CM

## 2020-02-13 DIAGNOSIS — D69.6 THROMBOCYTOPENIA: Primary | ICD-10-CM

## 2020-02-13 LAB
ALBUMIN SERPL BCP-MCNC: 4 G/DL (ref 3.5–5.2)
ALP SERPL-CCNC: 49 U/L (ref 55–135)
ALT SERPL W/O P-5'-P-CCNC: 11 U/L (ref 10–44)
ANION GAP SERPL CALC-SCNC: 10 MMOL/L (ref 8–16)
AST SERPL-CCNC: 21 U/L (ref 10–40)
BASOPHILS # BLD AUTO: 0.04 K/UL (ref 0–0.2)
BASOPHILS NFR BLD: 1 % (ref 0–1.9)
BILIRUB SERPL-MCNC: 0.8 MG/DL (ref 0.1–1)
BUN SERPL-MCNC: 11 MG/DL (ref 6–20)
CALCIUM SERPL-MCNC: 9.1 MG/DL (ref 8.7–10.5)
CHLORIDE SERPL-SCNC: 104 MMOL/L (ref 95–110)
CO2 SERPL-SCNC: 23 MMOL/L (ref 23–29)
CREAT SERPL-MCNC: 0.8 MG/DL (ref 0.5–1.4)
DIFFERENTIAL METHOD: ABNORMAL
EOSINOPHIL # BLD AUTO: 0.2 K/UL (ref 0–0.5)
EOSINOPHIL NFR BLD: 4.1 % (ref 0–8)
ERYTHROCYTE [DISTWIDTH] IN BLOOD BY AUTOMATED COUNT: 13 % (ref 11.5–14.5)
EST. GFR  (AFRICAN AMERICAN): >60 ML/MIN/1.73 M^2
EST. GFR  (NON AFRICAN AMERICAN): >60 ML/MIN/1.73 M^2
GLUCOSE SERPL-MCNC: 96 MG/DL (ref 70–110)
HCT VFR BLD AUTO: 33.3 % (ref 37–48.5)
HGB BLD-MCNC: 12.1 G/DL (ref 12–16)
IMM GRANULOCYTES # BLD AUTO: 0.01 K/UL (ref 0–0.04)
IMM GRANULOCYTES NFR BLD AUTO: 0.2 % (ref 0–0.5)
LYMPHOCYTES # BLD AUTO: 1.5 K/UL (ref 1–4.8)
LYMPHOCYTES NFR BLD: 36.6 % (ref 18–48)
MCH RBC QN AUTO: 34.8 PG (ref 27–31)
MCHC RBC AUTO-ENTMCNC: 36.3 G/DL (ref 32–36)
MCV RBC AUTO: 96 FL (ref 82–98)
MONOCYTES # BLD AUTO: 0.5 K/UL (ref 0.3–1)
MONOCYTES NFR BLD: 12 % (ref 4–15)
NEUTROPHILS # BLD AUTO: 1.9 K/UL (ref 1.8–7.7)
NEUTROPHILS NFR BLD: 46.1 % (ref 38–73)
NRBC BLD-RTO: 0 /100 WBC
PLATELET # BLD AUTO: 150 K/UL (ref 150–350)
PMV BLD AUTO: 8.7 FL (ref 9.2–12.9)
POTASSIUM SERPL-SCNC: 3.7 MMOL/L (ref 3.5–5.1)
PROT SERPL-MCNC: 7 G/DL (ref 6–8.4)
RBC # BLD AUTO: 3.48 M/UL (ref 4–5.4)
SODIUM SERPL-SCNC: 137 MMOL/L (ref 136–145)
WBC # BLD AUTO: 4.1 K/UL (ref 3.9–12.7)

## 2020-02-13 PROCEDURE — 3080F DIAST BP >= 90 MM HG: CPT | Mod: S$GLB,,, | Performed by: INTERNAL MEDICINE

## 2020-02-13 PROCEDURE — 3008F BODY MASS INDEX DOCD: CPT | Mod: S$GLB,,, | Performed by: INTERNAL MEDICINE

## 2020-02-13 PROCEDURE — 3077F SYST BP >= 140 MM HG: CPT | Mod: S$GLB,,, | Performed by: INTERNAL MEDICINE

## 2020-02-13 PROCEDURE — 80053 COMPREHEN METABOLIC PANEL: CPT

## 2020-02-13 PROCEDURE — 3077F PR MOST RECENT SYSTOLIC BLOOD PRESSURE >= 140 MM HG: ICD-10-PCS | Mod: S$GLB,,, | Performed by: INTERNAL MEDICINE

## 2020-02-13 PROCEDURE — 36415 COLL VENOUS BLD VENIPUNCTURE: CPT

## 2020-02-13 PROCEDURE — 3008F PR BODY MASS INDEX (BMI) DOCUMENTED: ICD-10-PCS | Mod: S$GLB,,, | Performed by: INTERNAL MEDICINE

## 2020-02-13 PROCEDURE — 99213 PR OFFICE/OUTPT VISIT, EST, LEVL III, 20-29 MIN: ICD-10-PCS | Mod: S$GLB,,, | Performed by: INTERNAL MEDICINE

## 2020-02-13 PROCEDURE — 3080F PR MOST RECENT DIASTOLIC BLOOD PRESSURE >= 90 MM HG: ICD-10-PCS | Mod: S$GLB,,, | Performed by: INTERNAL MEDICINE

## 2020-02-13 PROCEDURE — 99213 OFFICE O/P EST LOW 20 MIN: CPT | Mod: S$GLB,,, | Performed by: INTERNAL MEDICINE

## 2020-02-13 PROCEDURE — 85025 COMPLETE CBC W/AUTO DIFF WBC: CPT

## 2020-09-18 ENCOUNTER — TELEPHONE (OUTPATIENT)
Dept: HEMATOLOGY/ONCOLOGY | Facility: CLINIC | Age: 55
End: 2020-09-18

## 2020-09-18 DIAGNOSIS — M25.559 PAIN IN UNSPECIFIED HIP: Primary | ICD-10-CM

## 2020-09-20 NOTE — PROGRESS NOTES
"[facility] Hematology/Oncology  PROGRESS NOTE       Subjective:       Patient ID:   NAME: Fariba Gamez : 1965     54 y.o. female    Referring Doc: Rut  Other Physicians: Shekhar Araya, Natty Cummings    Chief Complaint:  Pancreatic mass f/u    History of Present Illness:     Patient returns today for a regularly scheduled follow-up visit.  The patient is here today to go over the results of the recently ordered labs, tests and studies.  She reports that she has been feeling "great" ; she has been eating well. She denies any CP, SOB, Ha's or N/V. She is here by herself today.     She previously had had a pancreatic mass biopsy with Dr Cummings at [facility] on 3/14/2019. She saw Dr Cummings again next year    Discussed covid19 precautions        ROS:   GEN: normal without any fever, night sweats or weight loss  HEENT: normal with no HA's, sore throat, stiff neck, changes in vision  CV: normal with no CP, SOB, PND, MARTIN or orthopnea  PULM: normal with no SOB, cough, hemoptysis, sputum or pleuritic pain  GI: normal with no abdominal pain, nausea, vomiting, constipation, diarrhea, melanotic stools, BRBPR, or hematemesis  : normal with no hematuria, dysuria  BREAST: normal with no mass, discharge, pain  SKIN: normal with no rash, erythema, bruising, or swelling    Allergies:  Review of patient's allergies indicates:  No Known Allergies    Medications:    Current Outpatient Medications:     amLODIPine (NORVASC) 5 MG tablet, Take 1 tablet (5 mg total) by mouth Daily., Disp: 90 tablet, Rfl: 0    atorvastatin (LIPITOR) 20 MG tablet, TAKE 1 TABLET(20 MG) BY MOUTH DAILY, Disp: 90 tablet, Rfl: 0    carvedilol (COREG) 3.125 MG tablet, Take 3.125 mg by mouth Daily., Disp: , Rfl:     carvedilol (COREG) 6.25 MG tablet, TAKE 1 TABLET(6.25 MG) BY MOUTH DAILY, Disp: 90 tablet, Rfl: 0    CREON CpDR, TK 1 C PO TID WC, Disp: , Rfl: 3    fenofibrate (TRICOR) 48 MG tablet, Take 1 tablet (48 mg total) by mouth Daily., Disp: " 90 tablet, Rfl: 0    hydrALAZINE (APRESOLINE) 50 MG tablet, TK 1 T PO Q 12 H, Disp: , Rfl: 0    hydrALAZINE (APRESOLINE) 50 MG tablet, Take 50 mg by mouth., Disp: , Rfl:     magnesium oxide (MAG-OX) 400 mg tablet, Take 1 tablet by mouth once daily., Disp: , Rfl: 3    omeprazole (PRILOSEC) 40 MG capsule, omeprazole 40 mg capsule,delayed release, Disp: , Rfl:     pantoprazole (PROTONIX) 40 MG tablet, TK 1 T PO QD, Disp: , Rfl: 0    spironolactone (ALDACTONE) 25 MG tablet, Take 1 tablet by mouth once daily., Disp: , Rfl: 3    PMHx/PSHx Updates:  See patient's last visit with me on 2/13/2020  See H&P on 9/12/2018        Pathology:  Cancer Staging  No matching staging information was found for the patient.    Pancreatic mass biopsy  3/14/2019:    Pancreas, core biopsy:  Scant specimen with mostly blood clot, and few entrapped pancreatic acini and partially preserved, superficial intestinal type mucosal fragments - insufficient for definitive diagnostic assessment.      Objective:     Vitals:  Blood pressure 114/75, pulse 69, temperature 96.8 °F (36 °C), weight 63.5 kg (140 lb).    Physical Examination:   GEN: no apparent distress, comfortable; AAOx3  HEAD: atraumatic and normocephalic  EYES: no pallor, no icterus, PERRLA  ENT: OMM, no pharyngeal erythema, external ears WNL; no nasal discharge; no thrush  NECK: no masses, thyroid normal, trachea midline, no LAD/LN's, supple  CV: RRR with no murmur; normal pulse; normal S1 and S2; mild pedal edema  CHEST: Normal respiratory effort; CTAB; normal breath sounds; no wheeze or crackles  ABDOM: nontender; soft; normal bowel sounds; no rebound/guarding; mildly distended and improved  MUSC/Skeletal: ROM normal; no crepitus; joints normal; no deformities or arthropathy  EXTREM: no clubbing, cyanosis, inflammation or swelling; prior edema (pedal) bilat resolved  SKIN: no rashes, lesions, ulcers, petechiae    or subcutaneous nodules  : no peña  NEURO: grossly intact;  motor/sensory WNL; AAOx3; no tremors  PSYCH: normal mood, affect and behavior  LYMPH: normal cervical, supraclavicular, axillary and groin LN's            Labs:      done at [place] this past Saturday           Radiology/Diagnostic Studies:    Ct abdom  12/5/2019:  Impression       1. 11 mm heterogeneously enhancing structure of the head of the pancreas is again identified, slightly less conspicuous when compared with previous exam.  2. There is a new focus of hypoenhancement within the body of the pancreas measuring 9 mm in diameter, which may reflect a dilated side duct, or developing cystic lesion.  3. No interval change of 12 mm celiac axis lymph node.  4. Cholelithiasis.  5. Small hiatal hernia.               CT Abdom 2/7/2019    IMPRESSION:    1. Poorly defined round slightly heterogeneous focus on early arterial phase imaging in pancreatic head measuring up to 11 mm in size, occurring at site of previous cystic-appearing pancreatic mass on 02/03/2017 MRI. It is likely that  this has not significantly changed in size, although comparison on current CT imaging with that of prior MR imaging is suboptimal. In addition to the previously reported differential diagnosis, cystic islet cell tumor is an additional consideration. Considerations for further evaluation include  endoscopic ultrasound targeted to the pancreatic head with potential tissue sampling of the potential persistent pancreatic head mass and/or repeat pancreatic protocol MRI abdomen without and with IV contrast for more precise comparison with the prior MRI.    2. New enlarged 12 mm celiac axis lymph node could either be reactive in nature due to infectious or inflammatory process or indicate neoplasm such as lymphoma  or metastatic disease. There is no additional evidence of metastatic disease or enlarged lymph nodes however. It is conceivable this too could be reassessed at time of potential endoscopic ultrasound, although if this is unable to be  performed, short-term imaging follow-up with either CT or MRI of the abdomen would be helpful to evaluate for stability.    3. Resolution of acute pancreatitis since 07/23/2018, now with few scattered pancreatic parenchymal calcifications which could indicate sequelae of chronic  pancreatitis.    4. Cholelithiasis.    5. Mild bilateral urothelial enhancement can be seen with urinary tract infection or reflux.    6. 37 mm left ovarian cyst is new since 07/23/2018. This is statistically likely of benign etiology and may be physiologic in nature. Further evaluation with ultrasound pelvis in 12 weeks is suggested to evaluate for resolution.    7. Unchanged 4 mm right middle lobe nodule.      CXR  2/7/2019:    IMPRESSION: No acute intrathoracic abnormality seen              Ct Chest Abdoment Pelvis With Contrast    Result Date: 9/26/2018  EXAMINATION: CT CHEST ABDOMEN PELVIS WITH CONTRAST (XPD) CLINICAL HISTORY: panc mass; Disease of pancreas, unspecified TECHNIQUE: Low dose axial images, sagittal and coronal reformations were obtained from the thoracic inlet to the pubic symphysis following the IV administration of 75 mL of Omnipaque 350 . No oral contrast was administered.  Pancreatic mass protocol was implemented. COMPARISON: No relevant prior. FINDINGS: There is a heterogeneous exophytic left thyroid nodule measuring 2.0 cm.  Recommend thyroid ultrasound for further evaluation. Heart is normal in size.  Pulmonary arteries distribute normally.  Aorta maintains a normal caliber throughout the thorax and abdomen. No mediastinal lymphadenopathy.  Prominent right hilar lymph node noted. The trachea and proximal airways are patent.  The lungs are equally well expanded.  No large consolidative opacity.  There is minimal platelike atelectasis at the right lung base.  No pleural effusion.  No pneumothorax.  There is a small, 0.4 cm nodule within the lateral segment of the right middle lobe (series 3, image 70). The liver  appears normal in size.  No focal hepatic lesions.  Portal vein is patent. There are some small calcified gallstones.  No biliary ductal dilatation.  Spleen, adrenal glands, and stomach are unremarkable. In the expected region of the pancreatic head, there is a poorly marginated, subtle area of relative hyper enhancement measuring 1.3 cm, possibly representing the patient's reported pancreatic head mass.  Remaining pancreatic tissue demonstrates extensive atrophy. Bowel shows no evidence of inflammation or obstruction. Kidneys are normal in size.  There is mild right pelviectasis.  Visualized portions of the ureters are unremarkable.  There is asymmetric right greater than left urinary bladder wall thickening.  Right adnexal hypodensity likely represents an ovarian cyst. Aorta maintains a normal caliber. Soft tissues are unremarkable.  Osseous structures demonstrate no significant abnormality.     In the expected region of the pancreatic head, there is a poorly marginated, inconspicuous area of lesion like enhancement measuring 1.3 cm, possibly representing the patient's reported pancreatic head mass.  Correlation with EUS is recommended. 0.4 cm nodule within the lateral segment of the right middle lobe.  In a low risk patient, no further follow-up is recommended.  In a high-risk patient (history of smoking and/or malignancy) consider follow-up chest CT in 12 months. Heterogeneous, exophytic left thyroid nodule measuring 2.0 cm.  Thyroid ultrasound is recommended for further evaluation. Mild right pelviectasis and asymmetric right urinary bladder wall thickening. Clinical correlation recommended. Probable right ovarian cyst. This report was flagged in Epic as abnormal. Electronically signed by resident: Isacc Jackson Date:    09/26/2018 Time:    11:21 Electronically signed by: Rambo Childers MD Date:    09/26/2018 Time:    11:54      I have reviewed all available lab results and radiology reports.    Assessment/Plan:    (1) 54 y.o. female with diagnosis of pancreatic mass in head of pancrease seen on Ultrasonography on 8/5  - she is followed by Dr Vanessa with GI  - she saw Dr Corrigan/Dr Cummings per my request, had EUS, and she reports that he was unable to identify any pancreatic mass but she had some inflammation.   - she saw Dr Enriquez with Surg-Onc at [facility] on 9/26/2018 and he was awaiting reports from Dr Cummings  - she saw Dr Vanessa with GI on 12/4/2018  - she had last scans on 2/7/2019, and was supposed to get MRI but did not have it done  - she had pancreatic mass biopsy on 4/14/2019 with Dr Cummings which appears to have been nondiagnostic  - she saw Dr Cummings in Nov 2019 and Dr Vanessa just yesterday  - she previously never did the recommended MRI that had been scheduled  - repeat CT abdom was done on 12/5/2019 9/21/2020:  - she sees Dr Cummings again next year  - she had some labs done recently at [place]     (2) Alcoholic liver disease/cirrhosis and prior alcoholic pancreatitis  - she has been sober for several months     (3) HTN and hypercholesterolemia     (4) prior stomach ulcers     (5) NCNC anemia, leucopenia and mild thrombocytopenia - most likely related to her liver disease  - latest hgb 12.1 and WNL  - wbc 4.1 and also WNL  - platelets are 150,000 and adequate    (6) Noncompliance issues which hinders my ability to provide her with care    (7) Left ovarian cyst                    Normochromic normocytic anemia    Leukopenia, unspecified type    Thrombocytopenia          PLAN:  1.  F/u with GI and hepatology - will follow there lead - she sees Dr Cummings again next year  2.  Encouraged compliance  3.  recommend repeat CT abdom   5. Check up to date labs every 6 months  6. RTC in 6 months         Fax note to Shekhar Vanessa Bolton, V. Joshi,  [place]    Discussion:     COVID-19 Discussion:    I had long discussion with patient and any applicable family about the COVID-19 coronavirus epidemic and  the recommended precautions with regard to cancer and/or hematology patients. I have re-iterated the CDC recommendations for adequate hand washing, use of hand -like products, and coughing into elbow, etc. In addition, especially for our patients who are on chemotherapy and/or our otherwise immunocompromised patients, I have recommended avoidance of crowds, including movie theaters, restaurants, churches, etc. I have recommended avoidance of any sick or symptomatic family members and/or friends. I have also recommended avoidance of any raw and unwashed food products, and general avoidance of food items that have not been prepared by themselves. The patient has been asked to call us immediately with any symptom developments, issues, questions or other general concerns.       I spent over 25 mins of time with the patient. Reviewed results of the recently ordered labs, tests and studies; made directives with regards to the results. Over half of this time was spent couseling and coordinating care.    I have explained all of the above in detail and the patient understands all of the current recommendation(s). I have answered all of their questions to the best of my ability and to their complete satisfaction.   The patient is to continue with the current management plan.              Electronically signed by Manjinder Christy MD

## 2020-09-21 ENCOUNTER — OFFICE VISIT (OUTPATIENT)
Dept: HEMATOLOGY/ONCOLOGY | Facility: CLINIC | Age: 55
End: 2020-09-21
Payer: MEDICARE

## 2020-09-21 VITALS
SYSTOLIC BLOOD PRESSURE: 114 MMHG | DIASTOLIC BLOOD PRESSURE: 75 MMHG | TEMPERATURE: 97 F | BODY MASS INDEX: 24.03 KG/M2 | WEIGHT: 140 LBS | HEART RATE: 69 BPM

## 2020-09-21 DIAGNOSIS — D69.6 THROMBOCYTOPENIA: ICD-10-CM

## 2020-09-21 DIAGNOSIS — K86.89 PANCREATIC MASS: ICD-10-CM

## 2020-09-21 DIAGNOSIS — D72.819 LEUKOPENIA, UNSPECIFIED TYPE: ICD-10-CM

## 2020-09-21 DIAGNOSIS — D64.9 NORMOCHROMIC NORMOCYTIC ANEMIA: Primary | ICD-10-CM

## 2020-09-21 DIAGNOSIS — R97.8 OTHER ABNORMAL TUMOR MARKERS: ICD-10-CM

## 2020-09-21 PROCEDURE — 3078F PR MOST RECENT DIASTOLIC BLOOD PRESSURE < 80 MM HG: ICD-10-PCS | Mod: S$GLB,,, | Performed by: INTERNAL MEDICINE

## 2020-09-21 PROCEDURE — 3074F SYST BP LT 130 MM HG: CPT | Mod: S$GLB,,, | Performed by: INTERNAL MEDICINE

## 2020-09-21 PROCEDURE — 99213 OFFICE O/P EST LOW 20 MIN: CPT | Mod: S$GLB,,, | Performed by: INTERNAL MEDICINE

## 2020-09-21 PROCEDURE — 3078F DIAST BP <80 MM HG: CPT | Mod: S$GLB,,, | Performed by: INTERNAL MEDICINE

## 2020-09-21 PROCEDURE — 3008F PR BODY MASS INDEX (BMI) DOCUMENTED: ICD-10-PCS | Mod: S$GLB,,, | Performed by: INTERNAL MEDICINE

## 2020-09-21 PROCEDURE — 99213 PR OFFICE/OUTPT VISIT, EST, LEVL III, 20-29 MIN: ICD-10-PCS | Mod: S$GLB,,, | Performed by: INTERNAL MEDICINE

## 2020-09-21 PROCEDURE — 3074F PR MOST RECENT SYSTOLIC BLOOD PRESSURE < 130 MM HG: ICD-10-PCS | Mod: S$GLB,,, | Performed by: INTERNAL MEDICINE

## 2020-09-21 PROCEDURE — 3008F BODY MASS INDEX DOCD: CPT | Mod: S$GLB,,, | Performed by: INTERNAL MEDICINE

## 2020-10-14 ENCOUNTER — HOSPITAL ENCOUNTER (OUTPATIENT)
Dept: RADIOLOGY | Facility: HOSPITAL | Age: 55
Discharge: HOME | End: 2020-10-14
Attending: INTERNAL MEDICINE
Payer: MEDICARE

## 2020-10-14 DIAGNOSIS — K86.89 PANCREATIC MASS: ICD-10-CM

## 2020-10-14 DIAGNOSIS — D72.819 LEUKOPENIA, UNSPECIFIED TYPE: ICD-10-CM

## 2020-10-14 DIAGNOSIS — D64.9 NORMOCHROMIC NORMOCYTIC ANEMIA: ICD-10-CM

## 2020-10-14 DIAGNOSIS — D69.6 THROMBOCYTOPENIA: ICD-10-CM

## 2020-10-14 LAB
CREAT SERPL-MCNC: 0.7 MG/DL (ref 0.5–1.4)
SAMPLE: NORMAL

## 2020-10-14 PROCEDURE — 74178 CT ABD&PLV WO CNTR FLWD CNTR: CPT | Mod: TC,PO

## 2020-10-14 PROCEDURE — 25500020 PHARM REV CODE 255: Mod: PO | Performed by: INTERNAL MEDICINE

## 2020-10-14 RX ADMIN — IOHEXOL 100 ML: 350 INJECTION, SOLUTION INTRAVENOUS at 01:10

## 2020-10-15 ENCOUNTER — TELEPHONE (OUTPATIENT)
Dept: HEMATOLOGY/ONCOLOGY | Facility: CLINIC | Age: 55
End: 2020-10-15

## 2020-10-15 NOTE — TELEPHONE ENCOUNTER
Called the patient and instructed her on the CT.  I asked her who does she see for GI and she instructed me Dr. Cummings.   I faxed the CT report.

## 2020-11-24 ENCOUNTER — TELEPHONE (OUTPATIENT)
Dept: HEMATOLOGY/ONCOLOGY | Facility: CLINIC | Age: 55
End: 2020-11-24

## 2020-11-24 NOTE — TELEPHONE ENCOUNTER
----- Message from Chanelle Goldman sent at 11/19/2020  3:47 PM CST -----  The patient wants a call back to diswcuss the results of her scan. She said she would like to talk to Dr. Christy. [phone].

## 2021-02-12 ENCOUNTER — HOSPITAL ENCOUNTER (OUTPATIENT)
Dept: PREADMISSION TESTING | Facility: HOSPITAL | Age: 56
Discharge: HOME | End: 2021-02-12
Attending: INTERNAL MEDICINE
Payer: MEDICARE

## 2021-02-12 DIAGNOSIS — Z01.818 OTHER SPECIFIED PRE-OPERATIVE EXAMINATION: Primary | ICD-10-CM

## 2021-02-12 DIAGNOSIS — Z01.818 OTHER SPECIFIED PRE-OPERATIVE EXAMINATION: ICD-10-CM

## 2021-02-12 PROCEDURE — 93010 EKG 12-LEAD: ICD-10-PCS | Mod: ,,, | Performed by: INTERNAL MEDICINE

## 2021-02-12 PROCEDURE — 93005 ELECTROCARDIOGRAM TRACING: CPT | Performed by: INTERNAL MEDICINE

## 2021-02-12 PROCEDURE — 93010 ELECTROCARDIOGRAM REPORT: CPT | Mod: ,,, | Performed by: INTERNAL MEDICINE

## 2021-04-19 DIAGNOSIS — K74.60 UNSPECIFIED CIRRHOSIS OF LIVER: Primary | ICD-10-CM

## 2021-04-30 NOTE — PROGRESS NOTES
[facility] Hematology/Oncology  PROGRESS NOTE       Subjective:       Patient ID:   NAME: Fariba Gamez : 1965     54 y.o. female    Referring Doc: Rut  Other Physicians: Shekhar Araya, Natty Cummings    Chief Complaint:  Pancreatic mass f/u    History of Present Illness:     Patient returns today for a regularly scheduled follow-up visit.  The patient is here today to go over the results of the recently ordered labs, tests and studies.  She reports that she has been feeling great and has been eating well. She denies any CP, SOB, Ha's or N/V. She is here with her daughtercon.      She saw Dr Vanessa in Oct 2019.    She had pancreatic mass biopsy with Dr Cummings at [facility] on 3/14/2019. She saw Dr Cummings just last week and she is seeing him again in one year.        ROS:   GEN: normal without any fever, night sweats or weight loss  HEENT: normal with no HA's, sore throat, stiff neck, changes in vision  CV: normal with no CP, SOB, PND, MARTIN or orthopnea  PULM: normal with no SOB, cough, hemoptysis, sputum or pleuritic pain  GI: normal with no abdominal pain, nausea, vomiting, constipation, diarrhea, melanotic stools, BRBPR, or hematemesis  : normal with no hematuria, dysuria  BREAST: normal with no mass, discharge, pain  SKIN: normal with no rash, erythema, bruising, or swelling    Allergies:  Review of patient's allergies indicates:  No Known Allergies    Medications:    Current Outpatient Medications:     amLODIPine (NORVASC) 5 MG tablet, Take 1 tablet (5 mg total) by mouth Daily., Disp: 90 tablet, Rfl: 0    atorvastatin (LIPITOR) 20 MG tablet, TAKE 1 TABLET(20 MG) BY MOUTH DAILY, Disp: 90 tablet, Rfl: 0    carvedilol (COREG) 3.125 MG tablet, Take 3.125 mg by mouth Daily., Disp: , Rfl:     carvedilol (COREG) 6.25 MG tablet, TAKE 1 TABLET(6.25 MG) BY MOUTH DAILY, Disp: 90 tablet, Rfl: 0    CREON CpDR, TK 1 C PO TID WC, Disp: , Rfl: 3    fenofibrate (TRICOR) 48 MG tablet, Take 1 tablet (48 mg  total) by mouth Daily., Disp: 90 tablet, Rfl: 0    hydrALAZINE (APRESOLINE) 50 MG tablet, TK 1 T PO Q 12 H, Disp: , Rfl: 0    hydrALAZINE (APRESOLINE) 50 MG tablet, Take 50 mg by mouth., Disp: , Rfl:     magnesium oxide (MAG-OX) 400 mg tablet, Take 1 tablet by mouth once daily., Disp: , Rfl: 3    omeprazole (PRILOSEC) 40 MG capsule, omeprazole 40 mg capsule,delayed release, Disp: , Rfl:     pantoprazole (PROTONIX) 40 MG tablet, TK 1 T PO QD, Disp: , Rfl: 0    spironolactone (ALDACTONE) 25 MG tablet, Take 1 tablet by mouth once daily., Disp: , Rfl: 3    PMHx/PSHx Updates:  See patient's last visit with me on 8/15/2019  See H&P on 9/12/2018        Pathology:  Cancer Staging  No matching staging information was found for the patient.    Pancreatic mass biopsy  3/14/2019:    Pancreas, core biopsy:  Scant specimen with mostly blood clot, and few entrapped pancreatic acini and partially preserved, superficial intestinal type mucosal fragments - insufficient for definitive diagnostic assessment.      Objective:     Vitals:  Blood pressure (!) 150/92, pulse 71, temperature 98.9 °F (37.2 °C), temperature source Oral, resp. rate 18, weight 56.6 kg (124 lb 11.2 oz).    Physical Examination:   GEN: no apparent distress, comfortable; AAOx3  HEAD: atraumatic and normocephalic  EYES: no pallor, no icterus, PERRLA  ENT: OMM, no pharyngeal erythema, external ears WNL; no nasal discharge; no thrush  NECK: no masses, thyroid normal, trachea midline, no LAD/LN's, supple  CV: RRR with no murmur; normal pulse; normal S1 and S2; mild pedal edema  CHEST: Normal respiratory effort; CTAB; normal breath sounds; no wheeze or crackles  ABDOM: nontender; soft; normal bowel sounds; no rebound/guarding; mildly distended and improved  MUSC/Skeletal: ROM normal; no crepitus; joints normal; no deformities or arthropathy  EXTREM: no clubbing, cyanosis, inflammation or swelling; prior edema (pedal) bilat resolved  SKIN: no rashes, lesions,  ulcers, petechiae    or subcutaneous nodules  : no peña  NEURO: grossly intact; motor/sensory WNL; AAOx3; no tremors  PSYCH: normal mood, affect and behavior  LYMPH: normal cervical, supraclavicular, axillary and groin LN's            Labs:     11/14/2019  Lab Results   Component Value Date    WBC 6.13 11/14/2019    HGB 13.0 11/14/2019    HCT 36.7 (L) 11/14/2019    MCV 97 11/14/2019     11/14/2019         [facility]    Radiology/Diagnostic Studies:    CT Abdom 2/7/2019    IMPRESSION:    1. Poorly defined round slightly heterogeneous focus on early arterial phase imaging in pancreatic head measuring up to 11 mm in size, occurring at site of previous cystic-appearing pancreatic mass on 02/03/2017 MRI. It is likely that  this has not significantly changed in size, although comparison on current CT imaging with that of prior MR imaging is suboptimal. In addition to the previously reported differential diagnosis, cystic islet cell tumor is an additional consideration. Considerations for further evaluation include  endoscopic ultrasound targeted to the pancreatic head with potential tissue sampling of the potential persistent pancreatic head mass and/or repeat pancreatic protocol MRI abdomen without and with IV contrast for more precise comparison with the prior MRI.    2. New enlarged 12 mm celiac axis lymph node could either be reactive in nature due to infectious or inflammatory process or indicate neoplasm such as lymphoma  or metastatic disease. There is no additional evidence of metastatic disease or enlarged lymph nodes however. It is conceivable this too could be reassessed at time of potential endoscopic ultrasound, although if this is unable to be performed, short-term imaging follow-up with either CT or MRI of the abdomen would be helpful to evaluate for stability.    3. Resolution of acute pancreatitis since 07/23/2018, now with few scattered pancreatic parenchymal calcifications which could  indicate sequelae of chronic  pancreatitis.    4. Cholelithiasis.    5. Mild bilateral urothelial enhancement can be seen with urinary tract infection or reflux.    6. 37 mm left ovarian cyst is new since 07/23/2018. This is statistically likely of benign etiology and may be physiologic in nature. Further evaluation with ultrasound pelvis in 12 weeks is suggested to evaluate for resolution.    7. Unchanged 4 mm right middle lobe nodule.      CXR  2/7/2019:    IMPRESSION: No acute intrathoracic abnormality seen              Ct Chest Abdoment Pelvis With Contrast    Result Date: 9/26/2018  EXAMINATION: CT CHEST ABDOMEN PELVIS WITH CONTRAST (XPD) CLINICAL HISTORY: panc mass; Disease of pancreas, unspecified TECHNIQUE: Low dose axial images, sagittal and coronal reformations were obtained from the thoracic inlet to the pubic symphysis following the IV administration of 75 mL of Omnipaque 350 . No oral contrast was administered.  Pancreatic mass protocol was implemented. COMPARISON: No relevant prior. FINDINGS: There is a heterogeneous exophytic left thyroid nodule measuring 2.0 cm.  Recommend thyroid ultrasound for further evaluation. Heart is normal in size.  Pulmonary arteries distribute normally.  Aorta maintains a normal caliber throughout the thorax and abdomen. No mediastinal lymphadenopathy.  Prominent right hilar lymph node noted. The trachea and proximal airways are patent.  The lungs are equally well expanded.  No large consolidative opacity.  There is minimal platelike atelectasis at the right lung base.  No pleural effusion.  No pneumothorax.  There is a small, 0.4 cm nodule within the lateral segment of the right middle lobe (series 3, image 70). The liver appears normal in size.  No focal hepatic lesions.  Portal vein is patent. There are some small calcified gallstones.  No biliary ductal dilatation.  Spleen, adrenal glands, and stomach are unremarkable. In the expected region of the pancreatic head,  there is a poorly marginated, subtle area of relative hyper enhancement measuring 1.3 cm, possibly representing the patient's reported pancreatic head mass.  Remaining pancreatic tissue demonstrates extensive atrophy. Bowel shows no evidence of inflammation or obstruction. Kidneys are normal in size.  There is mild right pelviectasis.  Visualized portions of the ureters are unremarkable.  There is asymmetric right greater than left urinary bladder wall thickening.  Right adnexal hypodensity likely represents an ovarian cyst. Aorta maintains a normal caliber. Soft tissues are unremarkable.  Osseous structures demonstrate no significant abnormality.     In the expected region of the pancreatic head, there is a poorly marginated, inconspicuous area of lesion like enhancement measuring 1.3 cm, possibly representing the patient's reported pancreatic head mass.  Correlation with EUS is recommended. 0.4 cm nodule within the lateral segment of the right middle lobe.  In a low risk patient, no further follow-up is recommended.  In a high-risk patient (history of smoking and/or malignancy) consider follow-up chest CT in 12 months. Heterogeneous, exophytic left thyroid nodule measuring 2.0 cm.  Thyroid ultrasound is recommended for further evaluation. Mild right pelviectasis and asymmetric right urinary bladder wall thickening. Clinical correlation recommended. Probable right ovarian cyst. This report was flagged in Epic as abnormal. Electronically signed by resident: Isacc Jackson Date:    09/26/2018 Time:    11:21 Electronically signed by: Rambo Childers MD Date:    09/26/2018 Time:    11:54      I have reviewed all available lab results and radiology reports.    Assessment/Plan:   (1) 54 y.o. female with diagnosis of pancreatic mass in head of pancrease seen on Ultrasonography on 8/5  - she is followed by Dr Vanessa with GI  - she saw Dr Corrigan/Dr Cummings per my request, had EUS, and she reports that he was unable to  identify any pancreatic mass but she had some inflammation.   - she saw Dr Enriquez with Surg-Onc at [facility] on 9/26/2018 and he was awaiting reports from Dr Cummings  - she saw Dr Vanessa with GI on 12/4/2018  - she had last scans on 2/7/2019, and was supposed to get MRI but did not have it done  - she had pancreatic mass biopsy on 4/14/2019 with Dr Cummings which appears to have been nondiagnostic  - she saw Dr Cummings just last week and Dr Vanessa this past Oct 2019  - she never did the recommended MRI that had been scheduled     (2) Alcoholic liver disease/cirrhosis and prior alcoholic pancreatitis  - she has been sober for several months     (3) HTN and hypercholesterolemia     (4) prior stomach ulcers     (5) NCNC anemia, leucopenia and mild thrombocytopenia - most likely related to her liver disease  - latest hgb 13.0 and WNL    (6) Noncompliance issues which hinders my ability to provide her with care    (7) Left ovarian cyst                    Leukopenia, unspecified type    Normochromic normocytic anemia    Thrombocytopenia    Pancreatic mass          PLAN:  1.  F/u with GI and hepatology - will follow there lead   2.  Encouraged compliance  3.  Previously recommended a repeat MRI of abdom with pancreatic protocol but she never went and had the test done  4. Will re-order Ct of abdom/pelvis  5. Check up to date labs every 3 months  6. RTC in 3 months         Fax note to Shekhar Vanessa Bolton, V. Joshi,  [place]    Discussion:       I spent over 25 mins of time with the patient. Reviewed results of the recently ordered labs, tests and studies; made directives with regards to the results. Over half of this time was spent couseling and coordinating care.    I have explained all of the above in detail and the patient understands all of the current recommendation(s). I have answered all of their questions to the best of my ability and to their complete satisfaction.   The patient is to continue with  the current management plan.              Electronically signed by Manjinder Christy MD         Minocycline Pregnancy And Lactation Text: This medication is Pregnancy Category D and not consider safe during pregnancy. It is also excreted in breast milk.

## 2021-05-03 ENCOUNTER — HOSPITAL ENCOUNTER (OUTPATIENT)
Dept: RADIOLOGY | Facility: HOSPITAL | Age: 56
Discharge: HOME | End: 2021-05-03
Attending: INTERNAL MEDICINE
Payer: MEDICARE

## 2021-05-03 DIAGNOSIS — K74.60 UNSPECIFIED CIRRHOSIS OF LIVER: ICD-10-CM

## 2021-05-03 PROCEDURE — 76705 ECHO EXAM OF ABDOMEN: CPT | Mod: TC

## 2022-01-14 DIAGNOSIS — K74.60 UNSPECIFIED CIRRHOSIS OF LIVER: Primary | ICD-10-CM

## 2022-01-28 NOTE — PROGRESS NOTES
"[facility] Hematology/Oncology  PROGRESS NOTE       Subjective:       Patient ID:   NAME: Fariba Gamez : 1965     56 y.o. female    Referring Doc: Rut  Other Physicians: Shekhar Araya, Natty Cummings    Chief Complaint:  Pancreatic mass f/u    History of Present Illness:     Patient returns today for a regularly scheduled follow-up visit.  The patient is here today to go over the results of the recently ordered labs, tests and studies. She last showed for a clinic visit in 2020.      She reports that she has been feeling "great" ; she has been eating well. She denies any CP, SOB, Ha's or N/V. She is here by herself today.     She previously had had a pancreatic mass biopsy with Dr Cummings at [facility] on 3/14/2019. She sees Dr Cummings again in 2022    Discussed covid19 precautions - she has not been vaccinated        ROS:   GEN: normal without any fever, night sweats or weight loss  HEENT: normal with no HA's, sore throat, stiff neck, changes in vision  CV: normal with no CP, SOB, PND, MARTIN or orthopnea  PULM: normal with no SOB, cough, hemoptysis, sputum or pleuritic pain  GI: normal with no abdominal pain, nausea, vomiting, constipation, diarrhea, melanotic stools, BRBPR, or hematemesis  : normal with no hematuria, dysuria  BREAST: normal with no mass, discharge, pain  SKIN: normal with no rash, erythema, bruising, or swelling    Allergies:  Review of patient's allergies indicates:  No Known Allergies    Medications:    Current Outpatient Medications:     amLODIPine (NORVASC) 5 MG tablet, Take 1 tablet (5 mg total) by mouth Daily., Disp: 90 tablet, Rfl: 0    atorvastatin (LIPITOR) 20 MG tablet, TAKE 1 TABLET(20 MG) BY MOUTH DAILY, Disp: 90 tablet, Rfl: 0    carvedilol (COREG) 3.125 MG tablet, Take 3.125 mg by mouth Daily., Disp: , Rfl:     carvedilol (COREG) 6.25 MG tablet, TAKE 1 TABLET(6.25 MG) BY MOUTH DAILY, Disp: 90 tablet, Rfl: 0    CREON CpDR, TK 1 C PO TID WC, Disp: , Rfl: 3    " "fenofibrate (TRICOR) 48 MG tablet, Take 1 tablet (48 mg total) by mouth Daily., Disp: 90 tablet, Rfl: 0    hydrALAZINE (APRESOLINE) 50 MG tablet, TK 1 T PO Q 12 H, Disp: , Rfl: 0    hydrALAZINE (APRESOLINE) 50 MG tablet, Take 50 mg by mouth., Disp: , Rfl:     magnesium oxide (MAG-OX) 400 mg tablet, Take 1 tablet by mouth once daily., Disp: , Rfl: 3    omeprazole (PRILOSEC) 40 MG capsule, omeprazole 40 mg capsule,delayed release, Disp: , Rfl:     pantoprazole (PROTONIX) 40 MG tablet, TK 1 T PO QD, Disp: , Rfl: 0    spironolactone (ALDACTONE) 25 MG tablet, Take 1 tablet by mouth once daily., Disp: , Rfl: 3    PMHx/PSHx Updates:  See patient's last visit with me on 9/21/2020  See H&P on 9/12/2018        Pathology:  Cancer Staging  No matching staging information was found for the patient.    Pancreatic mass biopsy  3/14/2019:    Pancreas, core biopsy:  Scant specimen with mostly blood clot, and few entrapped pancreatic acini and partially preserved, superficial intestinal type mucosal fragments - insufficient for definitive diagnostic assessment.      Objective:     Vitals:  Blood pressure (!) 146/87, pulse 76, temperature 98 °F (36.7 °C), resp. rate 18, height 5' 4" (1.626 m), weight 63 kg (139 lb).    Physical Examination:   GEN: no apparent distress, comfortable; AAOx3  HEAD: atraumatic and normocephalic  EYES: no pallor, no icterus, PERRLA  ENT: OMM, no pharyngeal erythema, external ears WNL; no nasal discharge; no thrush  NECK: no masses, thyroid normal, trachea midline, no LAD/LN's, supple  CV: RRR with no murmur; normal pulse; normal S1 and S2; mild pedal edema  CHEST: Normal respiratory effort; CTAB; normal breath sounds; no wheeze or crackles  ABDOM: nontender; soft; normal bowel sounds; no rebound/guarding; mildly distended and improved  MUSC/Skeletal: ROM normal; no crepitus; joints normal; no deformities or arthropathy  EXTREM: no clubbing, cyanosis, inflammation or swelling; no current edema  SKIN: " no rashes, lesions, ulcers, petechiae    or subcutaneous nodules  : no peña  NEURO: grossly intact; motor/sensory WNL; AAOx3; no tremors  PSYCH: normal mood, affect and behavior  LYMPH: normal cervical, supraclavicular, axillary and groin LN's            Labs:     None recent           Radiology/Diagnostic Studies:    US abdom  1/31/2022:    IMPRESSION:  1. Mild morphologic features of cirrhosis without mass to suggest hepatocellular carcinoma.  2. Cholelithiasis without acute cholecystitis.  3. Heterogeneous appearing pancreatic parenchyma with macroscopic calcification compatible with chronic pancreatitis. No ductal dilation or discrete mass is identified.  4. No ascites present.            Ct abdom  12/5/2019:  Impression       1. 11 mm heterogeneously enhancing structure of the head of the pancreas is again identified, slightly less conspicuous when compared with previous exam.  2. There is a new focus of hypoenhancement within the body of the pancreas measuring 9 mm in diameter, which may reflect a dilated side duct, or developing cystic lesion.  3. No interval change of 12 mm celiac axis lymph node.  4. Cholelithiasis.  5. Small hiatal hernia.               CT Abdom 2/7/2019    IMPRESSION:    1. Poorly defined round slightly heterogeneous focus on early arterial phase imaging in pancreatic head measuring up to 11 mm in size, occurring at site of previous cystic-appearing pancreatic mass on 02/03/2017 MRI. It is likely that  this has not significantly changed in size, although comparison on current CT imaging with that of prior MR imaging is suboptimal. In addition to the previously reported differential diagnosis, cystic islet cell tumor is an additional consideration. Considerations for further evaluation include  endoscopic ultrasound targeted to the pancreatic head with potential tissue sampling of the potential persistent pancreatic head mass and/or repeat pancreatic protocol MRI abdomen without and  with IV contrast for more precise comparison with the prior MRI.    2. New enlarged 12 mm celiac axis lymph node could either be reactive in nature due to infectious or inflammatory process or indicate neoplasm such as lymphoma  or metastatic disease. There is no additional evidence of metastatic disease or enlarged lymph nodes however. It is conceivable this too could be reassessed at time of potential endoscopic ultrasound, although if this is unable to be performed, short-term imaging follow-up with either CT or MRI of the abdomen would be helpful to evaluate for stability.    3. Resolution of acute pancreatitis since 07/23/2018, now with few scattered pancreatic parenchymal calcifications which could indicate sequelae of chronic  pancreatitis.    4. Cholelithiasis.    5. Mild bilateral urothelial enhancement can be seen with urinary tract infection or reflux.    6. 37 mm left ovarian cyst is new since 07/23/2018. This is statistically likely of benign etiology and may be physiologic in nature. Further evaluation with ultrasound pelvis in 12 weeks is suggested to evaluate for resolution.    7. Unchanged 4 mm right middle lobe nodule.      CXR  2/7/2019:    IMPRESSION: No acute intrathoracic abnormality seen              Ct Chest Abdoment Pelvis With Contrast    Result Date: 9/26/2018  EXAMINATION: CT CHEST ABDOMEN PELVIS WITH CONTRAST (XPD) CLINICAL HISTORY: panc mass; Disease of pancreas, unspecified TECHNIQUE: Low dose axial images, sagittal and coronal reformations were obtained from the thoracic inlet to the pubic symphysis following the IV administration of 75 mL of Omnipaque 350 . No oral contrast was administered.  Pancreatic mass protocol was implemented. COMPARISON: No relevant prior. FINDINGS: There is a heterogeneous exophytic left thyroid nodule measuring 2.0 cm.  Recommend thyroid ultrasound for further evaluation. Heart is normal in size.  Pulmonary arteries distribute normally.  Aorta maintains a  normal caliber throughout the thorax and abdomen. No mediastinal lymphadenopathy.  Prominent right hilar lymph node noted. The trachea and proximal airways are patent.  The lungs are equally well expanded.  No large consolidative opacity.  There is minimal platelike atelectasis at the right lung base.  No pleural effusion.  No pneumothorax.  There is a small, 0.4 cm nodule within the lateral segment of the right middle lobe (series 3, image 70). The liver appears normal in size.  No focal hepatic lesions.  Portal vein is patent. There are some small calcified gallstones.  No biliary ductal dilatation.  Spleen, adrenal glands, and stomach are unremarkable. In the expected region of the pancreatic head, there is a poorly marginated, subtle area of relative hyper enhancement measuring 1.3 cm, possibly representing the patient's reported pancreatic head mass.  Remaining pancreatic tissue demonstrates extensive atrophy. Bowel shows no evidence of inflammation or obstruction. Kidneys are normal in size.  There is mild right pelviectasis.  Visualized portions of the ureters are unremarkable.  There is asymmetric right greater than left urinary bladder wall thickening.  Right adnexal hypodensity likely represents an ovarian cyst. Aorta maintains a normal caliber. Soft tissues are unremarkable.  Osseous structures demonstrate no significant abnormality.     In the expected region of the pancreatic head, there is a poorly marginated, inconspicuous area of lesion like enhancement measuring 1.3 cm, possibly representing the patient's reported pancreatic head mass.  Correlation with EUS is recommended. 0.4 cm nodule within the lateral segment of the right middle lobe.  In a low risk patient, no further follow-up is recommended.  In a high-risk patient (history of smoking and/or malignancy) consider follow-up chest CT in 12 months. Heterogeneous, exophytic left thyroid nodule measuring 2.0 cm.  Thyroid ultrasound is recommended  for further evaluation. Mild right pelviectasis and asymmetric right urinary bladder wall thickening. Clinical correlation recommended. Probable right ovarian cyst. This report was flagged in Epic as abnormal. Electronically signed by resident: Isacc Jackson Date:    09/26/2018 Time:    11:21 Electronically signed by: Rambo Childers MD Date:    09/26/2018 Time:    11:54      I have reviewed all available lab results and radiology reports.    Assessment/Plan:   (1) 56 y.o. female with diagnosis of pancreatic mass in head of pancrease seen on Ultrasonography on 8/5  - she is followed by Dr Vanessa with GI  - she saw Dr Corrigan/Dr Parikh per my request, had EUS, and she reports that he was unable to identify any pancreatic mass but she had some inflammation.   - she saw Dr Enriquez with Surg-Onc at [facility] on 9/26/2018 and he was awaiting reports from Dr Parikh  - she saw Dr Vanessa with GI on 12/4/2018  - she had last scans on 2/7/2019, and was supposed to get MRI but did not have it done  - she had pancreatic mass biopsy on 4/14/2019 with Dr Parikh which appears to have been nondiagnostic  - she saw Dr Parikh in Nov 2019 and Dr Vanessa just yesterday  - she previously never did the recommended MRI that had been scheduled  - repeat CT abdom was done on 12/5/2019 9/21/2020:  - she sees Dr Parikh again next year  - she had some labs done recently at [facility]    1/31/2022:  - she had repeat US of abdom earlier today with report as detailed above  - she sees Dr Corrigan again in near future and Dr parikh in Feb 2022  - she needs up to date labs     (2) Alcoholic liver disease/cirrhosis and prior alcoholic pancreatitis  - she has been sober for several months     (3) HTN and hypercholesterolemia     (4) prior stomach ulcers     (5) NCNC anemia, leucopenia and mild thrombocytopenia - most likely related to her liver disease      1/31/2022:  - labs pending    (6) Noncompliance issues which hinders my ability to provide  her with care    (7) Left ovarian cyst                    Leukopenia, unspecified type    Thrombocytopenia    Normochromic normocytic anemia    Chronic calcific pancreatitis          PLAN:  1.  F/u with GI and hepatology - will follow there lead - she sees Dr Cummings again in Feb 2022  2.  Encouraged compliance  3.  recommend repeat CT abdom   5. Check up to date labs every 6 months  6. RTC in 12 months         Fax note to  Mina Corrigan V. Joshi,  [place]    Discussion:     COVID-19 Discussion:    I had long discussion with patient and any applicable family about the COVID-19 coronavirus epidemic and the recommended precautions with regard to cancer and/or hematology patients. I have re-iterated the CDC recommendations for adequate hand washing, use of hand -like products, and coughing into elbow, etc. In addition, especially for our patients who are on chemotherapy and/or our otherwise immunocompromised patients, I have recommended avoidance of crowds, including movie theaters, restaurants, churches, etc. I have recommended avoidance of any sick or symptomatic family members and/or friends. I have also recommended avoidance of any raw and unwashed food products, and general avoidance of food items that have not been prepared by themselves. The patient has been asked to call us immediately with any symptom developments, issues, questions or other general concerns.       I spent over 25 mins of time with the patient. Reviewed results of the recently ordered labs, tests and studies; made directives with regards to the results. Over half of this time was spent couseling and coordinating care.    I have explained all of the above in detail and the patient understands all of the current recommendation(s). I have answered all of their questions to the best of my ability and to their complete satisfaction.   The patient is to continue with the current management plan.              Electronically  signed by Manjinder Christy MD

## 2022-01-31 ENCOUNTER — HOSPITAL ENCOUNTER (OUTPATIENT)
Dept: RADIOLOGY | Facility: HOSPITAL | Age: 57
Discharge: HOME | End: 2022-01-31
Attending: INTERNAL MEDICINE
Payer: MEDICARE

## 2022-01-31 ENCOUNTER — OFFICE VISIT (OUTPATIENT)
Dept: HEMATOLOGY/ONCOLOGY | Facility: CLINIC | Age: 57
End: 2022-01-31
Payer: MEDICARE

## 2022-01-31 VITALS
HEIGHT: 64 IN | BODY MASS INDEX: 23.73 KG/M2 | WEIGHT: 139 LBS | SYSTOLIC BLOOD PRESSURE: 146 MMHG | RESPIRATION RATE: 18 BRPM | TEMPERATURE: 98 F | DIASTOLIC BLOOD PRESSURE: 87 MMHG | HEART RATE: 76 BPM

## 2022-01-31 DIAGNOSIS — D64.9 NORMOCHROMIC NORMOCYTIC ANEMIA: ICD-10-CM

## 2022-01-31 DIAGNOSIS — D69.6 THROMBOCYTOPENIA: ICD-10-CM

## 2022-01-31 DIAGNOSIS — D72.819 LEUKOPENIA, UNSPECIFIED TYPE: Primary | ICD-10-CM

## 2022-01-31 DIAGNOSIS — K74.60 UNSPECIFIED CIRRHOSIS OF LIVER: ICD-10-CM

## 2022-01-31 DIAGNOSIS — K86.1 CHRONIC CALCIFIC PANCREATITIS: ICD-10-CM

## 2022-01-31 PROCEDURE — 3008F PR BODY MASS INDEX (BMI) DOCUMENTED: ICD-10-PCS | Mod: S$GLB,,, | Performed by: INTERNAL MEDICINE

## 2022-01-31 PROCEDURE — 3079F PR MOST RECENT DIASTOLIC BLOOD PRESSURE 80-89 MM HG: ICD-10-PCS | Mod: S$GLB,,, | Performed by: INTERNAL MEDICINE

## 2022-01-31 PROCEDURE — 3077F SYST BP >= 140 MM HG: CPT | Mod: S$GLB,,, | Performed by: INTERNAL MEDICINE

## 2022-01-31 PROCEDURE — 3077F PR MOST RECENT SYSTOLIC BLOOD PRESSURE >= 140 MM HG: ICD-10-PCS | Mod: S$GLB,,, | Performed by: INTERNAL MEDICINE

## 2022-01-31 PROCEDURE — 3079F DIAST BP 80-89 MM HG: CPT | Mod: S$GLB,,, | Performed by: INTERNAL MEDICINE

## 2022-01-31 PROCEDURE — 1160F RVW MEDS BY RX/DR IN RCRD: CPT | Mod: S$GLB,,, | Performed by: INTERNAL MEDICINE

## 2022-01-31 PROCEDURE — 99213 OFFICE O/P EST LOW 20 MIN: CPT | Mod: S$GLB,,, | Performed by: INTERNAL MEDICINE

## 2022-01-31 PROCEDURE — 76700 US EXAM ABDOM COMPLETE: CPT | Mod: TC,PO

## 2022-01-31 PROCEDURE — 1160F PR REVIEW ALL MEDS BY PRESCRIBER/CLIN PHARMACIST DOCUMENTED: ICD-10-PCS | Mod: S$GLB,,, | Performed by: INTERNAL MEDICINE

## 2022-01-31 PROCEDURE — 3008F BODY MASS INDEX DOCD: CPT | Mod: S$GLB,,, | Performed by: INTERNAL MEDICINE

## 2022-01-31 PROCEDURE — 99213 PR OFFICE/OUTPT VISIT, EST, LEVL III, 20-29 MIN: ICD-10-PCS | Mod: S$GLB,,, | Performed by: INTERNAL MEDICINE

## 2022-06-15 ENCOUNTER — TELEPHONE (OUTPATIENT)
Dept: HEMATOLOGY/ONCOLOGY | Facility: CLINIC | Age: 57
End: 2022-06-15

## 2022-06-15 NOTE — TELEPHONE ENCOUNTER
Called pt to see if she was able to get her labs done for appt tomorrow. No answer and not an option to lvm. Will call pt back  Kevin

## 2022-06-16 ENCOUNTER — OFFICE VISIT (OUTPATIENT)
Dept: HEMATOLOGY/ONCOLOGY | Facility: CLINIC | Age: 57
End: 2022-06-16
Payer: MEDICARE

## 2022-06-16 VITALS
WEIGHT: 139.5 LBS | HEART RATE: 75 BPM | TEMPERATURE: 99 F | DIASTOLIC BLOOD PRESSURE: 93 MMHG | SYSTOLIC BLOOD PRESSURE: 182 MMHG | BODY MASS INDEX: 23.95 KG/M2

## 2022-06-16 DIAGNOSIS — D64.9 NORMOCHROMIC NORMOCYTIC ANEMIA: ICD-10-CM

## 2022-06-16 DIAGNOSIS — K86.1 CHRONIC CALCIFIC PANCREATITIS: Primary | ICD-10-CM

## 2022-06-16 DIAGNOSIS — D69.6 THROMBOCYTOPENIA: ICD-10-CM

## 2022-06-16 DIAGNOSIS — D72.819 LEUKOPENIA, UNSPECIFIED TYPE: ICD-10-CM

## 2022-06-16 DIAGNOSIS — K86.89 PANCREATIC MASS: ICD-10-CM

## 2022-06-16 PROCEDURE — 1159F PR MEDICATION LIST DOCUMENTED IN MEDICAL RECORD: ICD-10-PCS | Mod: CPTII,S$GLB,, | Performed by: INTERNAL MEDICINE

## 2022-06-16 PROCEDURE — 3077F PR MOST RECENT SYSTOLIC BLOOD PRESSURE >= 140 MM HG: ICD-10-PCS | Mod: CPTII,S$GLB,, | Performed by: INTERNAL MEDICINE

## 2022-06-16 PROCEDURE — 1160F RVW MEDS BY RX/DR IN RCRD: CPT | Mod: CPTII,S$GLB,, | Performed by: INTERNAL MEDICINE

## 2022-06-16 PROCEDURE — 99213 OFFICE O/P EST LOW 20 MIN: CPT | Mod: S$GLB,,, | Performed by: INTERNAL MEDICINE

## 2022-06-16 PROCEDURE — 3008F PR BODY MASS INDEX (BMI) DOCUMENTED: ICD-10-PCS | Mod: CPTII,S$GLB,, | Performed by: INTERNAL MEDICINE

## 2022-06-16 PROCEDURE — 3077F SYST BP >= 140 MM HG: CPT | Mod: CPTII,S$GLB,, | Performed by: INTERNAL MEDICINE

## 2022-06-16 PROCEDURE — 1159F MED LIST DOCD IN RCRD: CPT | Mod: CPTII,S$GLB,, | Performed by: INTERNAL MEDICINE

## 2022-06-16 PROCEDURE — 1160F PR REVIEW ALL MEDS BY PRESCRIBER/CLIN PHARMACIST DOCUMENTED: ICD-10-PCS | Mod: CPTII,S$GLB,, | Performed by: INTERNAL MEDICINE

## 2022-06-16 PROCEDURE — 3080F DIAST BP >= 90 MM HG: CPT | Mod: CPTII,S$GLB,, | Performed by: INTERNAL MEDICINE

## 2022-06-16 PROCEDURE — 99213 PR OFFICE/OUTPT VISIT, EST, LEVL III, 20-29 MIN: ICD-10-PCS | Mod: S$GLB,,, | Performed by: INTERNAL MEDICINE

## 2022-06-16 PROCEDURE — 3080F PR MOST RECENT DIASTOLIC BLOOD PRESSURE >= 90 MM HG: ICD-10-PCS | Mod: CPTII,S$GLB,, | Performed by: INTERNAL MEDICINE

## 2022-06-16 PROCEDURE — 3008F BODY MASS INDEX DOCD: CPT | Mod: CPTII,S$GLB,, | Performed by: INTERNAL MEDICINE

## 2022-08-08 ENCOUNTER — HOSPITAL ENCOUNTER (EMERGENCY)
Facility: HOSPITAL | Age: 57
Discharge: HOME | End: 2022-08-08
Attending: EMERGENCY MEDICINE
Payer: MEDICARE

## 2022-08-08 VITALS
WEIGHT: 125 LBS | HEIGHT: 64 IN | TEMPERATURE: 98 F | HEART RATE: 72 BPM | SYSTOLIC BLOOD PRESSURE: 128 MMHG | RESPIRATION RATE: 16 BRPM | OXYGEN SATURATION: 98 % | BODY MASS INDEX: 21.34 KG/M2 | DIASTOLIC BLOOD PRESSURE: 82 MMHG

## 2022-08-08 DIAGNOSIS — M54.9 BACK PAIN: ICD-10-CM

## 2022-08-08 DIAGNOSIS — E87.6 HYPOKALEMIA: Primary | ICD-10-CM

## 2022-08-08 LAB
ALBUMIN SERPL BCP-MCNC: 3.9 G/DL (ref 3.5–5.2)
ALP SERPL-CCNC: 90 U/L (ref 55–135)
ALT SERPL W/O P-5'-P-CCNC: 16 U/L (ref 10–44)
ANION GAP SERPL CALC-SCNC: 11 MMOL/L (ref 8–16)
AST SERPL-CCNC: 19 U/L (ref 10–40)
BASOPHILS # BLD AUTO: 0.04 K/UL (ref 0–0.2)
BASOPHILS NFR BLD: 0.6 % (ref 0–1.9)
BILIRUB SERPL-MCNC: 0.5 MG/DL (ref 0.1–1)
BILIRUB UR QL STRIP: NEGATIVE
BUN SERPL-MCNC: 6 MG/DL (ref 6–20)
CALCIUM SERPL-MCNC: 9.3 MG/DL (ref 8.7–10.5)
CHLORIDE SERPL-SCNC: 105 MMOL/L (ref 95–110)
CLARITY UR: CLEAR
CO2 SERPL-SCNC: 21 MMOL/L (ref 23–29)
COLOR UR: COLORLESS
CREAT SERPL-MCNC: 0.7 MG/DL (ref 0.5–1.4)
DIFFERENTIAL METHOD: ABNORMAL
EOSINOPHIL # BLD AUTO: 0.2 K/UL (ref 0–0.5)
EOSINOPHIL NFR BLD: 2.9 % (ref 0–8)
ERYTHROCYTE [DISTWIDTH] IN BLOOD BY AUTOMATED COUNT: 13.8 % (ref 11.5–14.5)
EST. GFR  (NO RACE VARIABLE): >60 ML/MIN/1.73 M^2
GLUCOSE SERPL-MCNC: 107 MG/DL (ref 70–110)
GLUCOSE UR QL STRIP: NEGATIVE
HCT VFR BLD AUTO: 35.5 % (ref 37–48.5)
HGB BLD-MCNC: 12.6 G/DL (ref 12–16)
HGB UR QL STRIP: NEGATIVE
IMM GRANULOCYTES # BLD AUTO: 0.02 K/UL (ref 0–0.04)
IMM GRANULOCYTES NFR BLD AUTO: 0.3 % (ref 0–0.5)
KETONES UR QL STRIP: NEGATIVE
LEUKOCYTE ESTERASE UR QL STRIP: NEGATIVE
LIPASE SERPL-CCNC: 26 U/L (ref 4–60)
LYMPHOCYTES # BLD AUTO: 2.6 K/UL (ref 1–4.8)
LYMPHOCYTES NFR BLD: 38.6 % (ref 18–48)
MCH RBC QN AUTO: 32.6 PG (ref 27–31)
MCHC RBC AUTO-ENTMCNC: 35.5 G/DL (ref 32–36)
MCV RBC AUTO: 92 FL (ref 82–98)
MONOCYTES # BLD AUTO: 0.6 K/UL (ref 0.3–1)
MONOCYTES NFR BLD: 8.3 % (ref 4–15)
NEUTROPHILS # BLD AUTO: 3.3 K/UL (ref 1.8–7.7)
NEUTROPHILS NFR BLD: 49.3 % (ref 38–73)
NITRITE UR QL STRIP: NEGATIVE
NRBC BLD-RTO: 0 /100 WBC
PH UR STRIP: 6 [PH] (ref 5–8)
PLATELET # BLD AUTO: 183 K/UL (ref 150–450)
PMV BLD AUTO: 9 FL (ref 9.2–12.9)
POTASSIUM SERPL-SCNC: 2.8 MMOL/L (ref 3.5–5.1)
PROT SERPL-MCNC: 6.8 G/DL (ref 6–8.4)
PROT UR QL STRIP: NEGATIVE
RBC # BLD AUTO: 3.86 M/UL (ref 4–5.4)
SODIUM SERPL-SCNC: 137 MMOL/L (ref 136–145)
SP GR UR STRIP: 1 (ref 1–1.03)
TROPONIN I SERPL DL<=0.01 NG/ML-MCNC: <0.03 NG/ML
URN SPEC COLLECT METH UR: ABNORMAL
UROBILINOGEN UR STRIP-ACNC: NEGATIVE EU/DL
WBC # BLD AUTO: 6.65 K/UL (ref 3.9–12.7)

## 2022-08-08 PROCEDURE — 83690 ASSAY OF LIPASE: CPT | Performed by: EMERGENCY MEDICINE

## 2022-08-08 PROCEDURE — 84484 ASSAY OF TROPONIN QUANT: CPT | Performed by: EMERGENCY MEDICINE

## 2022-08-08 PROCEDURE — 93005 ELECTROCARDIOGRAM TRACING: CPT | Performed by: INTERNAL MEDICINE

## 2022-08-08 PROCEDURE — 99284 EMERGENCY DEPT VISIT MOD MDM: CPT | Mod: 25

## 2022-08-08 PROCEDURE — 93010 ELECTROCARDIOGRAM REPORT: CPT | Mod: ,,, | Performed by: INTERNAL MEDICINE

## 2022-08-08 PROCEDURE — 25000003 PHARM REV CODE 250: Performed by: EMERGENCY MEDICINE

## 2022-08-08 PROCEDURE — 36415 COLL VENOUS BLD VENIPUNCTURE: CPT | Performed by: EMERGENCY MEDICINE

## 2022-08-08 PROCEDURE — 93010 EKG 12-LEAD: ICD-10-PCS | Mod: ,,, | Performed by: INTERNAL MEDICINE

## 2022-08-08 PROCEDURE — 85025 COMPLETE CBC W/AUTO DIFF WBC: CPT | Performed by: EMERGENCY MEDICINE

## 2022-08-08 PROCEDURE — 81003 URINALYSIS AUTO W/O SCOPE: CPT | Performed by: EMERGENCY MEDICINE

## 2022-08-08 PROCEDURE — 80053 COMPREHEN METABOLIC PANEL: CPT | Performed by: EMERGENCY MEDICINE

## 2022-08-08 RX ORDER — METHOCARBAMOL 500 MG/1
1000 TABLET, FILM COATED ORAL 3 TIMES DAILY
Qty: 30 TABLET | Refills: 0 | Status: SHIPPED | OUTPATIENT
Start: 2022-08-08 | End: 2022-08-13

## 2022-08-08 RX ORDER — POTASSIUM CHLORIDE 20 MEQ/1
40 TABLET, EXTENDED RELEASE ORAL ONCE
Status: COMPLETED | OUTPATIENT
Start: 2022-08-08 | End: 2022-08-08

## 2022-08-08 RX ORDER — METHOCARBAMOL 500 MG/1
1000 TABLET, FILM COATED ORAL 3 TIMES DAILY
Qty: 30 TABLET | Refills: 0 | Status: SHIPPED | OUTPATIENT
Start: 2022-08-08 | End: 2022-08-08 | Stop reason: SDUPTHER

## 2022-08-08 RX ADMIN — POTASSIUM CHLORIDE 40 MEQ: 1500 TABLET, EXTENDED RELEASE ORAL at 03:08

## 2022-08-08 NOTE — ED PROVIDER NOTES
Encounter Date: 8/8/2022       History     Chief Complaint   Patient presents with    Back Pain     MID X 1 DAY     56-year-old female who has had prior history of pancreatitis probably secondary to alcohol, hyperlipidemia, hypertension, anemia, thrombocytopenia and cirrhosis, presents with complaints of mid back pain.  The patient states the symptoms began yesterday.  She states it was similar pain that she had when she had had pancreatitis previously.  She does admit to recently drinking alcohol again additionally admits to use of marijuana.  No complaints of any lower abdominal pain.  Bowel habits have been normal.  No nausea vomiting.  No fever.  No complaints of any chest pain coughing or shortness of breath.  No palpitations.  No history any trauma changes in normal activities.  Patient states that her pain is a little worse with movement.        Review of patient's allergies indicates:  No Known Allergies  Past Medical History:   Diagnosis Date    Alcohol induced acute pancreatitis     Alcoholic cirrhosis of liver without ascites     Alcoholic liver disease     Gastric ulcer, unspecified as acute or chronic, without hemorrhage or perforation     Hyperlipidemia     Hypertension     Left-sided low back pain with left-sided sciatica     Leucopenia 9/12/2018    Normochromic normocytic anemia 9/12/2018    Pancreatic cancer 9/12/2018    Pancreatic mass 9/12/2018    Pancreatitis     Stomach ulcer     Thrombocytopenia 9/12/2018    Unspecified cirrhosis of liver      No past surgical history on file.  Family History   Problem Relation Age of Onset    Hypertension Mother     Kidney failure Mother     Hypertension Father     Heart attack Father      Social History     Tobacco Use    Smoking status: Never Smoker    Smokeless tobacco: Never Used   Substance Use Topics    Alcohol use: Yes     Comment: socially    Drug use: Yes     Frequency: 7.0 times per week     Types: Marijuana     Comment: daily      Review of Systems   Constitutional: Negative for appetite change, chills, diaphoresis and fever.   HENT: Negative.  Negative for sore throat.    Respiratory: Negative.  Negative for shortness of breath.    Cardiovascular: Negative.  Negative for chest pain.   Gastrointestinal: Positive for abdominal pain. Negative for nausea and vomiting.   Genitourinary: Negative for difficulty urinating and dysuria.   Musculoskeletal: Positive for back pain.   Skin: Negative for rash.   Neurological: Negative for dizziness, weakness and headaches.   Hematological: Does not bruise/bleed easily.   All other systems reviewed and are negative.      Physical Exam     Initial Vitals [08/08/22 1313]   BP Pulse Resp Temp SpO2   120/86 63 20 98.1 °F (36.7 °C) 99 %      MAP       --         Physical Exam    Vitals reviewed.  Constitutional: She appears well-developed and well-nourished. She is not diaphoretic. No distress.   HENT:   Head: Normocephalic and atraumatic.   Nose: Nose normal.   Mouth/Throat: Oropharynx is clear and moist. No oropharyngeal exudate.   Eyes: Conjunctivae are normal. Pupils are equal, round, and reactive to light.   Neck: Neck supple. No JVD present.   Normal range of motion.  Cardiovascular: Normal rate, regular rhythm, normal heart sounds and intact distal pulses. Exam reveals no gallop and no friction rub.    No murmur heard.  Pulmonary/Chest: Breath sounds normal. No respiratory distress. She has no wheezes. She has no rhonchi. She has no rales. She exhibits no tenderness.   Abdominal: Abdomen is soft. Bowel sounds are normal. She exhibits no distension. There is no abdominal tenderness. There is no rebound and no guarding.   Musculoskeletal:         General: No tenderness or edema. Normal range of motion.      Cervical back: Normal range of motion and neck supple.     Lymphadenopathy:     She has no cervical adenopathy.   Neurological: She is alert and oriented to person, place, and time. She has normal  strength. GCS score is 15. GCS eye subscore is 4. GCS verbal subscore is 5. GCS motor subscore is 6.   Skin: Skin is warm and dry. Capillary refill takes less than 2 seconds. No rash noted. No erythema. No pallor.   Psychiatric: She has a normal mood and affect. Her behavior is normal. Judgment and thought content normal.         ED Course   Procedures  Labs Reviewed   CBC W/ AUTO DIFFERENTIAL - Abnormal; Notable for the following components:       Result Value    RBC 3.86 (*)     Hematocrit 35.5 (*)     MCH 32.6 (*)     MPV 9.0 (*)     All other components within normal limits   COMPREHENSIVE METABOLIC PANEL - Abnormal; Notable for the following components:    Potassium 2.8 (*)     CO2 21 (*)     All other components within normal limits    Narrative:        K critical result(s) called and verbal readback obtained from   Chanda Swain RN  by NERIS 08/08/2022 14:53   URINALYSIS, REFLEX TO URINE CULTURE - Abnormal; Notable for the following components:    Color, UA Colorless (*)     All other components within normal limits    Narrative:     Specimen Source->Urine   LIPASE   TROPONIN I   TROPONIN I        ECG Results          EKG 12-lead (In process)  Result time 08/08/22 14:54:55    In process by Interface, Lab In [facility] (08/08/22 14:54:55)                 Narrative:    Test Reason : M54.9,    Vent. Rate : 054 BPM     Atrial Rate : 054 BPM     P-R Int : 194 ms          QRS Dur : 088 ms      QT Int : 448 ms       P-R-T Axes : 074 072 061 degrees     QTc Int : 424 ms    Sinus bradycardia  Moderate voltage criteria for LVH, may be normal variant  Cannot rule out Septal infarct (cited on or before 12-FEB-2021)  T wave abnormality, consider anterior ischemia  Abnormal ECG  When compared with ECG of 12-FEB-2021 14:09,  No significant change was found    Referred By: [email or web address]   SELF           Confirmed By:                             Imaging Results          X-Ray Chest PA And Lateral (Final result)  Result time 08/08/22  14:03:41    Final result by Eloy Obrien MD (08/08/22 14:03:41)                 Narrative:    Reason: Mid back pain.    FINDINGS:    PA and lateral chest without comparisons show normal cardiomediastinal silhouette.  Lungs are clear. Pulmonary vasculature is normal. No acute osseous abnormality.    IMPRESSION:    No acute cardiopulmonary abnormality.    Electronically signed by:  Eloy Obrien DO  8/8/2022 2:03 PM CDT Workstation: [id number]                               Medications   potassium chloride SA CR tablet 40 mEq (40 mEq Oral Given 8/8/22 1503)                Attending Attestation:             Attending ED Notes:   This 56-year-old female who presented with complaints of midthoracic back pain which she thought was secondary to her pancreatic problems foot she has had pancreatitis in the past with a similar presentation.  The patient at that time was drinking heavily and has recently started drinking alcohol again.  Today she has some mild direct epigastric tenderness as well as some mid back tenderness but this back discomfort that was worsened to some degree with movement.  The patient had screening labs obtained were unremarkable except for potassium of 2.8.  The patient is normally taking 20 mEq of potassium daily.  She is not on any diuretics.  During the ED course the patient received an additional 40 mEq of potassium in the ED the H EKG obtained showed some anterior ischemic changes but this is comparable to that seen in February of 2021.  Troponin today is negative.  The patient will be discharged and is to continue potassium but taking 40 mEq twice daily for several days.  She is again advised to refrain from any further alcohol use.  The patient is to also take Robaxin for her complaints of mid back pain.               Clinical Impression:   Final diagnoses:  [M54.9] Back pain  [E87.6] Hypokalemia (Primary)          ED Disposition Condition    Discharge Stable        ED Prescriptions      Medication Sig Dispense Start Date End Date Auth. Provider    methocarbamoL (ROBAXIN) 500 MG Tab Take 2 tablets (1,000 mg total) by mouth 3 (three) times daily. for 5 days 30 tablet 8/8/2022 8/13/2022 Osbaldo Narvaez Jr., MD        Follow-up Information    None          Osbaldo Narvaez Jr., MD  08/08/22 [id number]

## 2022-08-08 NOTE — DISCHARGE INSTRUCTIONS
Take 40 mEq of potassium which is 2 tablets twice daily for the next 3 days.  Afterwards have your blood potassium level rechecked.  Stop drinking alcohol.  Take ibuprofen 600 mg 3 times daily with food.  Return to the ER if needed.

## 2022-08-08 NOTE — Clinical Note
"Fariba Vaughn" Franco was seen and treated in our emergency department on 8/8/2022.  She may return to work on 08/10/2022.       If you have any questions or concerns, please don't hesitate to call.      Fariba RETANA RN"

## 2022-08-10 DIAGNOSIS — K86.1 CHRONIC PANCREATITIS: Primary | ICD-10-CM

## 2022-08-10 DIAGNOSIS — Z78.9 ALCOHOL USE: ICD-10-CM

## 2022-08-10 DIAGNOSIS — Z86.010 PERSONAL HISTORY OF COLONIC POLYPS: ICD-10-CM

## 2022-08-18 ENCOUNTER — HOSPITAL ENCOUNTER (OUTPATIENT)
Dept: PREADMISSION TESTING | Facility: HOSPITAL | Age: 57
Discharge: HOME | End: 2022-08-18
Attending: INTERNAL MEDICINE
Payer: MEDICARE

## 2022-08-18 VITALS
DIASTOLIC BLOOD PRESSURE: 82 MMHG | SYSTOLIC BLOOD PRESSURE: 124 MMHG | RESPIRATION RATE: 18 BRPM | BODY MASS INDEX: 21.34 KG/M2 | HEART RATE: 55 BPM | HEIGHT: 64 IN | TEMPERATURE: 99 F | OXYGEN SATURATION: 99 % | WEIGHT: 125 LBS

## 2022-08-18 DIAGNOSIS — Z01.818 PRE-OP TESTING: Primary | ICD-10-CM

## 2022-08-18 LAB — SARS-COV-2 RDRP RESP QL NAA+PROBE: NEGATIVE

## 2022-08-18 PROCEDURE — U0002 COVID-19 LAB TEST NON-CDC: HCPCS | Performed by: INTERNAL MEDICINE

## 2022-08-18 RX ORDER — ASPIRIN 81 MG/1
81 TABLET ORAL DAILY
COMMUNITY

## 2022-08-18 NOTE — PRE-PROCEDURE INSTRUCTIONS
Pre op interview - reviewed history and medications.  Reviewed colon prep and clear liquids.  Verbalized understanding.  NPO after midnight  Advised to take amlodipine and carvedilol am of procedure with sip of water.  . Questions answered

## 2022-08-18 NOTE — DISCHARGE INSTRUCTIONS
To confirm, Your doctor has instructed you that procedure is scheduled for: August 19    1 Person can come with you the day of surgery     Endoscopy nurse will call the afternoon prior to surgery with your arrival time.      Please  Enter at [facility] [employer] and come through front entrance.       Check in at registration.     After registration, proceed past gift shop and through glass door ( [facility] [place]) Check in at the nurses station to the left.     FOLLOW CLEAR LIQUIDS AND COLON PREP AS DIRECTED BY PHYSICIAN  Do not eat or drink anything after midnight the night before your surgery - THIS INCLUDES  WATER, GUM, MINTS AND CANDY.  YOU MAY BRUSH YOUR TEETH BUT DO NOT SWALLOW       TAKE ONLY THESE MEDICATIONS WITH A SMALL SIP OF WATER THE MORNING OF YOUR PROCEDURE: Carvedilol  Amlodipine       PLEASE NOTE:  The surgery schedule has many variables which may affect the time of your surgery case.  Family members should be available if your surgery time changes.  Plan to be here the day of your procedure between 4-6 hours.          IMPORTANT INSTRUCTIONS      Do not smoke, vape or drink alcoholic beverages 24 hours prior to your procedure.  Shower the night before  and sleep in a bed with clean sheets.  Do not sleep with a pet in the bed.       If you wear contact lenses, dentures, hearing aids or glasses, bring a container to put them in during surgery and give to a family member for safe keeping.    Please leave all jewelry, piercing's and valuables at home.   Wear rubber soled shoes (no flip flops).  ONLY for patients requiring bowel prep, written instructions will be given by your doctor's office.  If your doctor has scheduled you for an overnight stay, bring a small overnight bag with any personal items you need.    Make arrangements in advance for transportation home by a responsible adult.      You must make arrangements for transportation, TAXI'S, UBER'S OR LYFTS ARE NOT ALLOWED.        If you have any  questions about these instructions, call (Monday - Friday)  Endoscopy [phone]

## 2022-08-19 ENCOUNTER — ANESTHESIA EVENT (OUTPATIENT)
Dept: SURGERY | Facility: HOSPITAL | Age: 57
End: 2022-08-19
Payer: MEDICARE

## 2022-08-19 ENCOUNTER — HOSPITAL ENCOUNTER (OUTPATIENT)
Facility: HOSPITAL | Age: 57
Discharge: HOME | End: 2022-08-19
Attending: INTERNAL MEDICINE | Admitting: INTERNAL MEDICINE
Payer: MEDICARE

## 2022-08-19 ENCOUNTER — ANESTHESIA (OUTPATIENT)
Dept: SURGERY | Facility: HOSPITAL | Age: 57
End: 2022-08-19
Payer: MEDICARE

## 2022-08-19 VITALS
TEMPERATURE: 97 F | HEIGHT: 64 IN | HEART RATE: 65 BPM | RESPIRATION RATE: 15 BRPM | DIASTOLIC BLOOD PRESSURE: 78 MMHG | WEIGHT: 125.25 LBS | SYSTOLIC BLOOD PRESSURE: 141 MMHG | BODY MASS INDEX: 21.38 KG/M2 | OXYGEN SATURATION: 100 %

## 2022-08-19 VITALS
SYSTOLIC BLOOD PRESSURE: 101 MMHG | TEMPERATURE: 98 F | OXYGEN SATURATION: 100 % | HEART RATE: 47 BPM | RESPIRATION RATE: 13 BRPM | DIASTOLIC BLOOD PRESSURE: 67 MMHG

## 2022-08-19 DIAGNOSIS — K86.1 CHRONIC PANCREATITIS: ICD-10-CM

## 2022-08-19 PROCEDURE — 63600175 PHARM REV CODE 636 W HCPCS: Performed by: NURSE ANESTHETIST, CERTIFIED REGISTERED

## 2022-08-19 PROCEDURE — 27100019 HC AMBU BAG ADULT/PED: Performed by: NURSE ANESTHETIST, CERTIFIED REGISTERED

## 2022-08-19 PROCEDURE — 37000008 HC ANESTHESIA 1ST 15 MINUTES: Performed by: INTERNAL MEDICINE

## 2022-08-19 PROCEDURE — 25000003 PHARM REV CODE 250: Performed by: NURSE ANESTHETIST, CERTIFIED REGISTERED

## 2022-08-19 PROCEDURE — 27200043 HC FORCEPS, BIOPSY: Performed by: INTERNAL MEDICINE

## 2022-08-19 PROCEDURE — 27000671 HC TUBING MICROBORE EXT: Performed by: NURSE ANESTHETIST, CERTIFIED REGISTERED

## 2022-08-19 PROCEDURE — 27000675 HC TUBING MICRODRIP: Performed by: NURSE ANESTHETIST, CERTIFIED REGISTERED

## 2022-08-19 PROCEDURE — 45378 DIAGNOSTIC COLONOSCOPY: CPT | Performed by: INTERNAL MEDICINE

## 2022-08-19 PROCEDURE — 25000003 PHARM REV CODE 250: Performed by: INTERNAL MEDICINE

## 2022-08-19 PROCEDURE — 43239 EGD BIOPSY SINGLE/MULTIPLE: CPT | Performed by: INTERNAL MEDICINE

## 2022-08-19 PROCEDURE — 43237 ENDOSCOPIC US EXAM ESOPH: CPT | Performed by: INTERNAL MEDICINE

## 2022-08-19 PROCEDURE — 27202103: Performed by: NURSE ANESTHETIST, CERTIFIED REGISTERED

## 2022-08-19 PROCEDURE — 88312 SPECIAL STAINS GROUP 1: CPT | Mod: TC

## 2022-08-19 PROCEDURE — 00813 ANES UPR LWR GI NDSC PX: CPT | Performed by: INTERNAL MEDICINE

## 2022-08-19 PROCEDURE — 88305 TISSUE EXAM BY PATHOLOGIST: CPT | Mod: TC

## 2022-08-19 PROCEDURE — 37000009 HC ANESTHESIA EA ADD 15 MINS: Performed by: INTERNAL MEDICINE

## 2022-08-19 RX ORDER — CETIRIZINE HYDROCHLORIDE 10 MG/1
10 TABLET ORAL DAILY
COMMUNITY

## 2022-08-19 RX ORDER — PROPOFOL 10 MG/ML
VIAL (ML) INTRAVENOUS
Status: DISCONTINUED | OUTPATIENT
Start: 2022-08-19 | End: 2022-08-19

## 2022-08-19 RX ORDER — LISINOPRIL 20 MG/1
20 TABLET ORAL DAILY
COMMUNITY

## 2022-08-19 RX ORDER — POTASSIUM CHLORIDE 750 MG/1
10 CAPSULE, EXTENDED RELEASE ORAL ONCE
COMMUNITY

## 2022-08-19 RX ORDER — PHENYLEPHRINE HYDROCHLORIDE 10 MG/ML
INJECTION INTRAVENOUS
Status: DISCONTINUED | OUTPATIENT
Start: 2022-08-19 | End: 2022-08-19

## 2022-08-19 RX ADMIN — SODIUM CHLORIDE: 0.9 INJECTION, SOLUTION INTRAVENOUS at 12:08

## 2022-08-19 RX ADMIN — PROPOFOL 30 MG: 10 INJECTION, EMULSION INTRAVENOUS at 02:08

## 2022-08-19 RX ADMIN — PROPOFOL 30 MG: 10 INJECTION, EMULSION INTRAVENOUS at 01:08

## 2022-08-19 RX ADMIN — PROPOFOL 50 MG: 10 INJECTION, EMULSION INTRAVENOUS at 01:08

## 2022-08-19 RX ADMIN — SIMETHICONE 20 MG: 20 SUSPENSION/ DROPS ORAL at 02:08

## 2022-08-19 RX ADMIN — SODIUM CHLORIDE: 0.9 INJECTION, SOLUTION INTRAVENOUS at 01:08

## 2022-08-19 RX ADMIN — PHENYLEPHRINE HYDROCHLORIDE 200 MCG: 10 INJECTION INTRAVENOUS at 02:08

## 2022-08-19 NOTE — ANESTHESIA PREPROCEDURE EVALUATION
08/19/2022  Fariba Gamez is a 56 y.o., female.      Patient Active Problem List   Diagnosis    Normochromic normocytic anemia    Thrombocytopenia    Leucopenia    Pancreatic mass    Chronic calcific pancreatitis       Past Surgical History:   Procedure Laterality Date    ESOPHAGOGASTRODUODENOSCOPY      PARTIAL HYSTERECTOMY          Tobacco Use:  The patient  reports that she has never smoked. She has never used smokeless tobacco.     Results for orders placed or performed during the hospital encounter of 08/08/22   EKG 12-lead    Collection Time: 08/08/22  2:55 PM    Narrative    Test Reason : M54.9,    Vent. Rate : 054 BPM     Atrial Rate : 054 BPM     P-R Int : 194 ms          QRS Dur : 088 ms      QT Int : 448 ms       P-R-T Axes : 074 072 061 degrees     QTc Int : 424 ms    Sinus bradycardia  Moderate voltage criteria for LVH, may be normal variant  Cannot rule out Septal infarct (cited on or before 12-FEB-2021)  T wave abnormality, consider anterior ischemia  Abnormal ECG  When compared with ECG of 12-FEB-2021 14:09,  No significant change was found  Confirmed by Dwight Soliz MD ([id number]) on 8/10/2022 1:08:56 PM    Referred By: [email or web address]   SELF           Confirmed By:Dwight Soliz MD             Lab Results   Component Value Date    WBC 5.76 08/18/2022    HGB 13.9 08/18/2022    HCT 39.1 08/18/2022    MCV 94 08/18/2022     08/18/2022     BMP  Lab Results   Component Value Date     08/18/2022    K 4.5 08/18/2022     08/18/2022    CO2 25 08/18/2022    BUN 10 08/18/2022    CREATININE 0.9 08/18/2022    CALCIUM 9.3 08/18/2022    ANIONGAP 9 08/18/2022     08/18/2022     08/08/2022     (H) 02/12/2021       No results found for this or any previous visit.        Pre-op Assessment    I have reviewed the Patient Summary Reports.     I have reviewed the Nursing  Notes. I have reviewed the NPO Status.   I have reviewed the Medications.     Review of Systems  Anesthesia Hx:  No problems with previous Anesthesia Denies Hx of Anesthetic complications  Denies Family Hx of Anesthesia complications.   Denies Personal Hx of Anesthesia complications.   Social:  Alcohol Use, Smoker Marijuana; Times per week: 7 Illicit Drug Use: Types of drugs include Marijuana,   Hematology/Oncology:  Hematology Normal   Oncology Normal     EENT/Dental:EENT/Dental Normal   Cardiovascular:   Hypertension, well controlled hyperlipidemia ECG has been reviewed.    Pulmonary:   Patient with history of bronchitis  Inhaler use as needed last use 2 days ago  No home oxygen use  No hospitalizations for exacerbations  No pulmonologist at this time   Renal/:  Renal/ Normal     Hepatic/GI:   PUD, Liver Disease, Patient with no active nausea vomiting at this  Patient with no intestinal obstruction at this time  Pancreatic mass  Chronic calcific pancreatitis  Alcoholic cirrhosis of liver without ascites  Alcohol induced acute pancreatitis   Musculoskeletal:   Left-sided low back pain with left-sided sciatica Spine Disorders: lumbar Chronic Pain    Neurological:   Neuromuscular Disease,    Endocrine:  Endocrine Normal    Dermatological:  Skin Normal    Psych:  Psychiatric Normal           Physical Exam  General: Well nourished, Cooperative, Alert and Oriented    Airway:  Mallampati: II / I  Mouth Opening: Normal  TM Distance: Normal  Tongue: Normal  Neck ROM: Normal ROM    Dental:    Chest/Lungs:  Clear to auscultation, Normal Respiratory Rate    Heart:  Rate: Normal  Rhythm: Regular Rhythm  Sounds: Normal        Anesthesia Plan  Type of Anesthesia, risks & benefits discussed:    Anesthesia Type: MAC  Intra-op Monitoring Plan: Standard ASA Monitors  Induction:  IV  Informed Consent: Informed consent signed with the Patient and all parties understand the risks and agree with anesthesia plan.  All questions  answered.   ASA Score: 2  Anesthesia Plan Notes:     MAC with Propofol  POM Mask    Ready For Surgery From Anesthesia Perspective.     .

## 2022-08-19 NOTE — TRANSFER OF CARE
"Anesthesia Transfer of Care Note    Patient: Fariba Gamez    Procedure(s) Performed: Procedure(s) (LRB):  ULTRASOUND, UPPER GI TRACT, ENDOSCOPIC (N/A)  COLONOSCOPY (N/A)    Patient location: GI    Anesthesia Type: MAC    Transport from OR: Transported from OR on room air with adequate spontaneous ventilation    Post pain: adequate analgesia    Post assessment: no apparent anesthetic complications    Post vital signs: stable    Level of consciousness: awake and alert    Nausea/Vomiting: no nausea/vomiting    Complications: none    Transfer of care protocol was followed      Last vitals:   Visit Vitals  BP (!) 152/82 (BP Location: Left arm, Patient Position: Lying)   Pulse (!) 56   Temp 36.4 °C (97.5 °F) (Oral)   Resp 16   Ht 5' 4" (1.626 m)   Wt 56.8 kg (125 lb 3.9 oz)   SpO2 100%   BMI 21.50 kg/m²     "

## 2022-08-19 NOTE — H&P
GASTROENTEROLOGY PRE-PROCEDURE H&P NOTE  Patient Name: Fariba Gamez  Patient MRN: [id number]  Patient : 1965    Service date: 2022    PCP: Sari De Los Santos NP    No chief complaint on file.      HPI: Patient is a 56 y.o. female with PMHx as below here for evaluation of chronic pancreatitis, abnormal CT, nodular liver, and h/o polyps    Past Medical History:  Past Medical History:   Diagnosis Date    Alcohol induced acute pancreatitis     Alcoholic cirrhosis of liver without ascites     Alcoholic liver disease     Gastric ulcer, unspecified as acute or chronic, without hemorrhage or perforation     Hyperlipidemia     Hypertension     Left-sided low back pain with left-sided sciatica     Leucopenia 2018    Normochromic normocytic anemia 2018    Pancreatic cancer 2018    Pancreatic mass 2018    Pancreatitis     Stomach ulcer     Thrombocytopenia 2018    Unspecified cirrhosis of liver         Past Surgical History:  Past Surgical History:   Procedure Laterality Date    ESOPHAGOGASTRODUODENOSCOPY      PARTIAL HYSTERECTOMY          Home Medications:  Medications Prior to Admission   Medication Sig Dispense Refill Last Dose    amLODIPine (NORVASC) 5 MG tablet Take 1 tablet (5 mg total) by mouth Daily. 90 tablet 0 2022 at 0800    aspirin (ECOTRIN) 81 MG EC tablet Take 81 mg by mouth once daily.   Past Week at Unknown time    atorvastatin (LIPITOR) 20 MG tablet TAKE 1 TABLET(20 MG) BY MOUTH DAILY (Patient taking differently: Take 20 mg by mouth once daily.) 90 tablet 0 2022 at Unknown time    carvedilol (COREG) 6.25 MG tablet TAKE 1 TABLET(6.25 MG) BY MOUTH DAILY 90 tablet 0 2022 at Unknown time    cetirizine (ZYRTEC) 10 MG tablet Take 10 mg by mouth once daily.   2022 at Unknown time    CREON CpDR 1 capsule 3 (three) times daily with meals.  3 2022 at Unknown time    fenofibrate (TRICOR) 48 MG tablet Take 1 tablet (48 mg total) by mouth  Daily. 90 tablet 0 8/18/2022 at Unknown time    hydrALAZINE (APRESOLINE) 50 MG tablet Take 50 mg by mouth every 12 (twelve) hours.  0 8/18/2022 at Unknown time    lisinopriL (PRINIVIL,ZESTRIL) 20 MG tablet Take 20 mg by mouth once daily.   8/19/2022 at Unknown time    magnesium oxide (MAG-OX) 400 mg tablet Take 1 tablet by mouth once daily.  3 8/18/2022 at Unknown time    pantoprazole (PROTONIX) 40 MG tablet Take 40 mg by mouth once daily.  0 8/18/2022 at Unknown time    potassium chloride (MICRO-K) 10 MEQ CpSR Take 10 mEq by mouth once.   8/18/2022 at Unknown time    spironolactone (ALDACTONE) 25 MG tablet Take 25 mg by mouth once daily.  3 8/18/2022 at Unknown time    carvedilol (COREG) 3.125 MG tablet Take 3.125 mg by mouth Daily.       hydrALAZINE (APRESOLINE) 50 MG tablet Take 50 mg by mouth.       omeprazole (PRILOSEC) 40 MG capsule Take 40 mg by mouth every morning.          Inpatient Medications:        Review of patient's allergies indicates:   Allergen Reactions    Opioids - morphine analogues Itching       Social History:   Social History     Occupational History    Not on file   Tobacco Use    Smoking status: Never Smoker    Smokeless tobacco: Never Used   Substance and Sexual Activity    Alcohol use: Yes     Alcohol/week: 2.0 standard drinks     Types: 2 Cans of beer per week     Comment: socially    Drug use: Yes     Frequency: 7.0 times per week     Types: Marijuana     Comment: daily    Sexual activity: Not on file       Family History:   Family History   Problem Relation Age of Onset    Hypertension Mother     Kidney failure Mother     Hypertension Father     Heart attack Father        Review of Systems:  A 10 point review of systems was performed and was normal, except as mentioned in the HPI, including constitutional, HEENT, heme, lymph, cardiovascular, respiratory, gastrointestinal, genitourinary, neurologic, endocrine, psychiatric and  "musculoskeletal.      OBJECTIVE:    Physical Exam:  24 Hour Vital Sign Ranges: BP: (135)/(81) 135/81  Most recent vitals: /81   Ht 5' 4" (1.626 m)   Wt 56.8 kg (125 lb 3.9 oz)   BMI 21.50 kg/m²    GEN: well-developed, well-nourished, awake and alert, non-toxic appearing adult  HEENT: PERRL, sclera anicteric, oral mucosa pink and moist without lesion  NECK: trachea midline; Good ROM  CV: regular rate and rhythm, no murmurs or gallops  RESP: clear to auscultation bilaterally, no wheezes, rhonci or rales  ABD: soft, non-tender, non-distended, normal bowel sounds  EXT: no swelling or edema, 2+ pulses distally  SKIN: no rashes or jaundice  PSYCH: normal affect    Labs:   Recent Labs     08/18/22  1119   WBC 5.76   MCV 94        Recent Labs     08/18/22  1119 08/19/22  1031     --    K 4.5 3.4*     --    CO2 25  --    BUN 10  --      --      No results for input(s): ALB in the last 72 hours.    Invalid input(s): ALKP, SGOT, SGPT, TBIL, DBIL, TPRO  No results for input(s): PT, INR, PTT in the last 72 hours.      IMPRESSION / RECOMMENDATIONS:  EGD / EUS /COlon  RIsks, benefits, alternatives discussed in detail regarding upcoming procedures and sedation. Some of the more common endoscopic complications include but not limited to immediate or delayed perforation, bleeding, infections, pain, inadvertent injury to surrounding tissue / organs and possible need for surgical evaluation. Patient expressed understanding, all questions answered and will proceed with procedure as planned.     Simon CORADO [employer]  8/19/2022  1:44 PM      "

## 2022-08-19 NOTE — ANESTHESIA POSTPROCEDURE EVALUATION
Anesthesia Post Evaluation    Patient: Fariba Gamez    Procedure(s) Performed: Procedure(s) (LRB):  ULTRASOUND, UPPER GI TRACT, ENDOSCOPIC (N/A)  COLONOSCOPY (N/A)    Final Anesthesia Type: MAC      Patient location during evaluation: GI PACU  Patient participation: Yes- Able to Participate  Level of consciousness: awake and alert  Post-procedure vital signs: reviewed and stable  Pain management: adequate  Airway patency: patent    PONV status at discharge: No PONV  Anesthetic complications: no      Cardiovascular status: blood pressure returned to baseline and stable  Respiratory status: unassisted and room air  Hydration status: euvolemic  Follow-up not needed.          Vitals Value Taken Time   /67 08/19/22 1504   Temp 96.8 08/19/22 1504   Pulse 47 08/19/22 1504   Resp 16 08/19/22 1504   SpO2 100% 08/19/22 1504         Event Time   Out of Recovery 14:54:27         Pain/Riya Score: No data recorded

## 2022-08-19 NOTE — [employer]OVATION PATIENT INSTRUCTIONS
Discharge Summary/Instructions after an Endoscopic Procedure  Patient Name: Fariba Gamez  Patient MRN: [id number]  Patient YOB: 1965  Friday, August 19, 2022  Simon Corrigan III, MD  RESTRICTIONS:  During your procedure today, you received medications for sedation.  These   medications may affect your judgment, balance and coordination.  Therefore,   for 24 hours, you have the following restrictions:   - DO NOT drive a car, operate machinery, make legal/financial decisions,   sign important papers or drink alcohol.    ACTIVITY:  Today: no heavy lifting, straining or running due to procedural   sedation/anesthesia.  The following day: return to full activity including work.  DIET:  Eat and drink normally unless instructed otherwise.     TREATMENT FOR COMMON SIDE EFFECTS:  - Mild abdominal pain, nausea, belching, bloating or excessive gas:  rest,   eat lightly and use a heating pad.  - Sore Throat: treat with throat lozenges and/or gargle with warm salt   water.  - Because air was used during the procedure, expelling large amounts of air   from your rectum or belching is normal.  - If a bowel prep was taken, you may not have a bowel movement for 1-3 days.    This is normal.  SYMPTOMS TO WATCH FOR AND REPORT TO YOUR PHYSICIAN:  1. Abdominal pain or bloating, other than gas cramps.  2. Chest pain.  3. Back pain.  4. Signs of infection such as: chills or fever occurring within 24 hours   after the procedure.  5. Rectal bleeding, which would show as bright red, maroon, or black stools.   (A tablespoon of blood from the rectum is not serious, especially if   hemorrhoids are present.)  6. Vomiting.  7. Weakness or dizziness.  GO DIRECTLY TO THE NEAREST EMERGENCY ROOM IF YOU HAVE ANY OF THE FOLLOWING:      Difficulty breathing              Chills and/or fever over 101 F   Persistent vomiting and/or vomiting blood   Severe abdominal pain   Severe chest pain   Black, tarry stools   Bleeding- more than one  tablespoon   Any other symptom or condition that you feel may need urgent attention  Your doctor recommends these additional instructions:  If any biopsies were taken, your doctors clinic will contact you in 1 to 2   weeks with any results.  - Discharge patient to home.   - Patient has a contact number available for emergencies.  The signs and   symptoms of potential delayed complications were discussed with the   patient.  Return to normal activities tomorrow.  Written discharge   instructions were provided to the patient.   - Resume regular diet.   - Continue present medications.  For questions, problems or results please call your physician - Simon Corrigan III, MD at Work:  [phone].  [facility], EMERGENCY ROOM PHONE NUMBER: [phone]  IF A COMPLICATION OR EMERGENCY SITUATION ARISES AND YOU ARE UNABLE TO REACH   YOUR PHYSICIAN - GO DIRECTLY TO THE EMERGENCY ROOM.  Simon Corrigan III, MD  8/19/2022 2:31:35 PM  This report has been verified and signed electronically.  Dear patient,  As a result of recent federal legislation (The Federal Cures Act), you may   receive lab or pathology results from your procedure in your [facility]   account before your physician is able to contact you. Your physician or   their representative will relay the results to you with their   recommendations at their soonest availability.  Thank you,  [employer]ATION

## 2022-08-19 NOTE — PROVATION PATIENT INSTRUCTIONS
Discharge Summary/Instructions after an Endoscopic Procedure  Patient Name: Fariba Gamez  Patient MRN: [id number]  Patient YOB: 1965  Friday, August 19, 2022  Simon Corrigan III, MD  RESTRICTIONS:  During your procedure today, you received medications for sedation.  These   medications may affect your judgment, balance and coordination.  Therefore,   for 24 hours, you have the following restrictions:   - DO NOT drive a car, operate machinery, make legal/financial decisions,   sign important papers or drink alcohol.    ACTIVITY:  Today: no heavy lifting, straining or running due to procedural   sedation/anesthesia.  The following day: return to full activity including work.  DIET:  Eat and drink normally unless instructed otherwise.     TREATMENT FOR COMMON SIDE EFFECTS:  - Mild abdominal pain, nausea, belching, bloating or excessive gas:  rest,   eat lightly and use a heating pad.  - Sore Throat: treat with throat lozenges and/or gargle with warm salt   water.  - Because air was used during the procedure, expelling large amounts of air   from your rectum or belching is normal.  - If a bowel prep was taken, you may not have a bowel movement for 1-3 days.    This is normal.  SYMPTOMS TO WATCH FOR AND REPORT TO YOUR PHYSICIAN:  1. Abdominal pain or bloating, other than gas cramps.  2. Chest pain.  3. Back pain.  4. Signs of infection such as: chills or fever occurring within 24 hours   after the procedure.  5. Rectal bleeding, which would show as bright red, maroon, or black stools.   (A tablespoon of blood from the rectum is not serious, especially if   hemorrhoids are present.)  6. Vomiting.  7. Weakness or dizziness.  GO DIRECTLY TO THE NEAREST EMERGENCY ROOM IF YOU HAVE ANY OF THE FOLLOWING:      Difficulty breathing              Chills and/or fever over 101 F   Persistent vomiting and/or vomiting blood   Severe abdominal pain   Severe chest pain   Black, tarry stools   Bleeding- more than one  tablespoon   Any other symptom or condition that you feel may need urgent attention  Your doctor recommends these additional instructions:  If any biopsies were taken, your doctors clinic will contact you in 1 to 2   weeks with any results.  - Patient has a contact number available for emergencies.  The signs and   symptoms of potential delayed complications were discussed with the   patient.  Return to normal activities tomorrow.  Written discharge   instructions were provided to the patient.   - Resume previous diet.   - Discharge patient to home (ambulatory).   - Continue present medications.   - Repeat colonoscopy in 5 years for surveillance.   - Return to GI clinic PRN.  For questions, problems or results please call your physician - Simon Corrigan III, MD at Work:  [phone].  [facility], EMERGENCY ROOM PHONE NUMBER: [phone]  IF A COMPLICATION OR EMERGENCY SITUATION ARISES AND YOU ARE UNABLE TO REACH   YOUR PHYSICIAN - GO DIRECTLY TO THE EMERGENCY ROOM.  Simon Corrigan III, MD  8/19/2022 2:33:09 PM  This report has been verified and signed electronically.  Dear patient,  As a result of recent federal legislation (The Federal Cures Act), you may   receive lab or pathology results from your procedure in your [facility]   account before your physician is able to contact you. Your physician or   their representative will relay the results to you with their   recommendations at their soonest availability.  Thank you,  PROVATION

## 2022-08-26 ENCOUNTER — HOSPITAL ENCOUNTER (OUTPATIENT)
Dept: RADIOLOGY | Facility: HOSPITAL | Age: 57
Discharge: HOME | End: 2022-08-26
Attending: INTERNAL MEDICINE
Payer: MEDICARE

## 2022-08-26 DIAGNOSIS — Z78.9 ALCOHOL USE: ICD-10-CM

## 2022-08-26 DIAGNOSIS — K86.1 CHRONIC PANCREATITIS: ICD-10-CM

## 2022-08-26 DIAGNOSIS — Z86.010 PERSONAL HISTORY OF COLONIC POLYPS: ICD-10-CM

## 2022-08-26 PROCEDURE — 76705 ECHO EXAM OF ABDOMEN: CPT | Mod: TC,PO

## 2022-10-06 NOTE — PROGRESS NOTES
"[facility] Hematology/Oncology  PROGRESS NOTE       Subjective:       Patient ID:   NAME: Fariba Gamez : 1965     56 y.o. female    Referring Doc: Rut  Other Physicians: Shekhar Araya, Natty Cummings    Chief Complaint:  Pancreatic mass f/u    History of Present Illness:     Patient returns today for a regularly scheduled follow-up visit.  The patient is here today to go over the results of the recently ordered labs, tests and studies. She is here with two family members.        She reports that she has been feeling "good" ; she has been eating well. She denies any CP, SOB, Ha's or N/V.      she saw opthalmology yesterday at [facility] in [place] and is now on an oral medication for her eyes    She previously had had a pancreatic mass biopsy with Dr Cummings at [facility] on 3/14/2019. She sees Dr Cummings again in 2022     Discussed covid19 precautions - she has not been vaccinated        ROS:   GEN: normal without any fever, night sweats or weight loss  HEENT: normal with no HA's, sore throat, stiff neck, changes in vision  CV: normal with no CP, SOB, PND, MARTIN or orthopnea  PULM: normal with no SOB, cough, hemoptysis, sputum or pleuritic pain  GI: normal with no abdominal pain, nausea, vomiting, constipation, diarrhea, melanotic stools, BRBPR, or hematemesis  : normal with no hematuria, dysuria  BREAST: normal with no mass, discharge, pain  SKIN: normal with no rash, erythema, bruising, or swelling    Allergies:  Review of patient's allergies indicates:  No Known Allergies    Medications:    Current Outpatient Medications:     amLODIPine (NORVASC) 5 MG tablet, Take 1 tablet (5 mg total) by mouth Daily., Disp: 90 tablet, Rfl: 0    atorvastatin (LIPITOR) 20 MG tablet, TAKE 1 TABLET(20 MG) BY MOUTH DAILY, Disp: 90 tablet, Rfl: 0    carvedilol (COREG) 3.125 MG tablet, Take 3.125 mg by mouth Daily., Disp: , Rfl:     carvedilol (COREG) 6.25 MG tablet, TAKE 1 TABLET(6.25 MG) BY MOUTH DAILY, " Disp: 90 tablet, Rfl: 0    CREON CpDR, TK 1 C PO TID WC, Disp: , Rfl: 3    fenofibrate (TRICOR) 48 MG tablet, Take 1 tablet (48 mg total) by mouth Daily., Disp: 90 tablet, Rfl: 0    hydrALAZINE (APRESOLINE) 50 MG tablet, TK 1 T PO Q 12 H, Disp: , Rfl: 0    hydrALAZINE (APRESOLINE) 50 MG tablet, Take 50 mg by mouth., Disp: , Rfl:     magnesium oxide (MAG-OX) 400 mg tablet, Take 1 tablet by mouth once daily., Disp: , Rfl: 3    omeprazole (PRILOSEC) 40 MG capsule, omeprazole 40 mg capsule,delayed release, Disp: , Rfl:     pantoprazole (PROTONIX) 40 MG tablet, TK 1 T PO QD, Disp: , Rfl: 0    spironolactone (ALDACTONE) 25 MG tablet, Take 1 tablet by mouth once daily., Disp: , Rfl: 3    PMHx/PSHx Updates:  See patient's last visit with me on 1/31/2022  See H&P on 9/12/2018        Pathology:  Cancer Staging  No matching staging information was found for the patient.    Pancreatic mass biopsy  3/14/2019:    Pancreas, core biopsy:  Scant specimen with mostly blood clot, and few entrapped pancreatic acini and partially preserved, superficial intestinal type mucosal fragments - insufficient for definitive diagnostic assessment.      Objective:     Vitals:  Blood pressure (!) 182/93, pulse 75, temperature 98.5 °F (36.9 °C), weight 63.3 kg (139 lb 8 oz).    Physical Examination:   GEN: no apparent distress, comfortable; AAOx3  HEAD: atraumatic and normocephalic  EYES: no pallor, no icterus, PERRLA  ENT: OMM, no pharyngeal erythema, external ears WNL; no nasal discharge; no thrush  NECK: no masses, thyroid normal, trachea midline, no LAD/LN's, supple  CV: RRR with no murmur; normal pulse; normal S1 and S2; mild pedal edema  CHEST: Normal respiratory effort; CTAB; normal breath sounds; no wheeze or crackles  ABDOM: nontender; soft; normal bowel sounds; no rebound/guarding; mildly distended   MUSC/Skeletal: ROM normal; no crepitus; joints normal; no deformities or arthropathy  EXTREM: no clubbing, cyanosis, inflammation or  swelling; no current edema  SKIN: no rashes, lesions, ulcers, petechiae    or subcutaneous nodules  : no peña  NEURO: grossly intact; motor/sensory WNL; AAOx3; no tremors  PSYCH: normal mood, affect and behavior  LYMPH: normal cervical, supraclavicular, axillary and groin LN's            Labs:      pending from PCP           Radiology/Diagnostic Studies:    US abdom  1/31/2022:    IMPRESSION:  1. Mild morphologic features of cirrhosis without mass to suggest hepatocellular carcinoma.  2. Cholelithiasis without acute cholecystitis.  3. Heterogeneous appearing pancreatic parenchyma with macroscopic calcification compatible with chronic pancreatitis. No ductal dilation or discrete mass is identified.  4. No ascites present.            Ct abdom  12/5/2019:  Impression       1. 11 mm heterogeneously enhancing structure of the head of the pancreas is again identified, slightly less conspicuous when compared with previous exam.  2. There is a new focus of hypoenhancement within the body of the pancreas measuring 9 mm in diameter, which may reflect a dilated side duct, or developing cystic lesion.  3. No interval change of 12 mm celiac axis lymph node.  4. Cholelithiasis.  5. Small hiatal hernia.               CT Abdom 2/7/2019    IMPRESSION:    1. Poorly defined round slightly heterogeneous focus on early arterial phase imaging in pancreatic head measuring up to 11 mm in size, occurring at site of previous cystic-appearing pancreatic mass on 02/03/2017 MRI. It is likely that  this has not significantly changed in size, although comparison on current CT imaging with that of prior MR imaging is suboptimal. In addition to the previously reported differential diagnosis, cystic islet cell tumor is an additional consideration. Considerations for further evaluation include  endoscopic ultrasound targeted to the pancreatic head with potential tissue sampling of the potential persistent pancreatic head mass and/or repeat  pancreatic protocol MRI abdomen without and with IV contrast for more precise comparison with the prior MRI.    2. New enlarged 12 mm celiac axis lymph node could either be reactive in nature due to infectious or inflammatory process or indicate neoplasm such as lymphoma  or metastatic disease. There is no additional evidence of metastatic disease or enlarged lymph nodes however. It is conceivable this too could be reassessed at time of potential endoscopic ultrasound, although if this is unable to be performed, short-term imaging follow-up with either CT or MRI of the abdomen would be helpful to evaluate for stability.    3. Resolution of acute pancreatitis since 07/23/2018, now with few scattered pancreatic parenchymal calcifications which could indicate sequelae of chronic  pancreatitis.    4. Cholelithiasis.    5. Mild bilateral urothelial enhancement can be seen with urinary tract infection or reflux.    6. 37 mm left ovarian cyst is new since 07/23/2018. This is statistically likely of benign etiology and may be physiologic in nature. Further evaluation with ultrasound pelvis in 12 weeks is suggested to evaluate for resolution.    7. Unchanged 4 mm right middle lobe nodule.      CXR  2/7/2019:    IMPRESSION: No acute intrathoracic abnormality seen              Ct Chest Abdoment Pelvis With Contrast    Result Date: 9/26/2018  EXAMINATION: CT CHEST ABDOMEN PELVIS WITH CONTRAST (XPD) CLINICAL HISTORY: panc mass; Disease of pancreas, unspecified TECHNIQUE: Low dose axial images, sagittal and coronal reformations were obtained from the thoracic inlet to the pubic symphysis following the IV administration of 75 mL of Omnipaque 350 . No oral contrast was administered.  Pancreatic mass protocol was implemented. COMPARISON: No relevant prior. FINDINGS: There is a heterogeneous exophytic left thyroid nodule measuring 2.0 cm.  Recommend thyroid ultrasound for further evaluation. Heart is normal in size.  Pulmonary  arteries distribute normally.  Aorta maintains a normal caliber throughout the thorax and abdomen. No mediastinal lymphadenopathy.  Prominent right hilar lymph node noted. The trachea and proximal airways are patent.  The lungs are equally well expanded.  No large consolidative opacity.  There is minimal platelike atelectasis at the right lung base.  No pleural effusion.  No pneumothorax.  There is a small, 0.4 cm nodule within the lateral segment of the right middle lobe (series 3, image 70). The liver appears normal in size.  No focal hepatic lesions.  Portal vein is patent. There are some small calcified gallstones.  No biliary ductal dilatation.  Spleen, adrenal glands, and stomach are unremarkable. In the expected region of the pancreatic head, there is a poorly marginated, subtle area of relative hyper enhancement measuring 1.3 cm, possibly representing the patient's reported pancreatic head mass.  Remaining pancreatic tissue demonstrates extensive atrophy. Bowel shows no evidence of inflammation or obstruction. Kidneys are normal in size.  There is mild right pelviectasis.  Visualized portions of the ureters are unremarkable.  There is asymmetric right greater than left urinary bladder wall thickening.  Right adnexal hypodensity likely represents an ovarian cyst. Aorta maintains a normal caliber. Soft tissues are unremarkable.  Osseous structures demonstrate no significant abnormality.     In the expected region of the pancreatic head, there is a poorly marginated, inconspicuous area of lesion like enhancement measuring 1.3 cm, possibly representing the patient's reported pancreatic head mass.  Correlation with EUS is recommended. 0.4 cm nodule within the lateral segment of the right middle lobe.  In a low risk patient, no further follow-up is recommended.  In a high-risk patient (history of smoking and/or malignancy) consider follow-up chest CT in 12 months. Heterogeneous, exophytic left thyroid nodule  measuring 2.0 cm.  Thyroid ultrasound is recommended for further evaluation. Mild right pelviectasis and asymmetric right urinary bladder wall thickening. Clinical correlation recommended. Probable right ovarian cyst. This report was flagged in Epic as abnormal. Electronically signed by resident: Isacc Jackson Date:    09/26/2018 Time:    11:21 Electronically signed by: Rambo Childers MD Date:    09/26/2018 Time:    11:54      I have reviewed all available lab results and radiology reports.    Assessment/Plan:   (1) 56 y.o. female with diagnosis of pancreatic mass in head of pancrease seen on Ultrasonography on 8/5  - she is followed by Dr Vanessa with GI  - she saw Dr Corrigan/Dr Parikh per my request, had EUS, and she reports that he was unable to identify any pancreatic mass but she had some inflammation.   - she saw Dr Enriquez with Surg-Onc at [facility] on 9/26/2018 and he was awaiting reports from Dr Parikh  - she saw Dr Vanessa with GI on 12/4/2018  - she had last scans on 2/7/2019, and was supposed to get MRI but did not have it done  - she had pancreatic mass biopsy on 4/14/2019 with Dr Parikh which appears to have been nondiagnostic  - she saw Dr Parikh in Nov 2019 and Dr Vanessa just yesterday  - she previously never did the recommended MRI that had been scheduled  - repeat CT abdom was done on 12/5/2019 9/21/2020:  - she sees Dr Parikh again next year  - she had some labs done recently at [place]    1/31/2022:  - she had repeat US of abdom earlier today with report as detailed above  - she sees Dr Corrigan again in near future and Dr parikh in Feb 2022  - she needs up to date labs    6/16/2022:  - she had ;labs done with Dr De Los Santos   - to see Dr Parikh in July 2022     (2) Alcoholic liver disease/cirrhosis and prior alcoholic pancreatitis  - she has been sober for several months     (3) HTN and hypercholesterolemia     (4) prior stomach ulcers     (5) NCNC anemia, leucopenia and mild thrombocytopenia -  most likely related to her liver disease      1/31/2022:  - labs pending    (6) Noncompliance issues which hinders my ability to provide her with care    (7) Left ovarian cyst     (8) Recent opthalmologic visit - need notes      6/16/2022:  - [facility] in [place] and is now on an oral medication for her eyes              Chronic calcific pancreatitis    Pancreatic mass    Leukopenia, unspecified type    Normochromic normocytic anemia    Thrombocytopenia          PLAN:  1.  F/u with GI and hepatology - will follow there lead - she sees Dr Cummings again in July 2022  2.  Encouraged compliance  3. Need latest labs from her PCP - Dr De Los Santos  5. Check up to date labs every 6 months (encouarged)  6. Need latest notes from [facility]  7. RTC in 12 months         Fax note to  Mina Corrigan V. Joshi, Bernstein, Sehon (new PCP)    Discussion:     COVID-19 Discussion:    I had long discussion with patient and any applicable family about the COVID-19 coronavirus epidemic and the recommended precautions with regard to cancer and/or hematology patients. I have re-iterated the CDC recommendations for adequate hand washing, use of hand -like products, and coughing into elbow, etc. In addition, especially for our patients who are on chemotherapy and/or our otherwise immunocompromised patients, I have recommended avoidance of crowds, including movie theaters, restaurants, churches, etc. I have recommended avoidance of any sick or symptomatic family members and/or friends. I have also recommended avoidance of any raw and unwashed food products, and general avoidance of food items that have not been prepared by themselves. The patient has been asked to call us immediately with any symptom developments, issues, questions or other general concerns.       I spent over 25 mins of time with the patient. Reviewed results of the recently ordered labs, tests and studies; made directives with regards to the results. Over half of  this time was spent couseling and coordinating care.    I have explained all of the above in detail and the patient understands all of the current recommendation(s). I have answered all of their questions to the best of my ability and to their complete satisfaction.   The patient is to continue with the current management plan.              Electronically signed by Manjinder Christy MD               Constipation

## 2022-10-17 DIAGNOSIS — M70.61 TROCHANTERIC BURSITIS OF RIGHT HIP: Primary | ICD-10-CM

## 2022-11-29 ENCOUNTER — CLINICAL SUPPORT (OUTPATIENT)
Dept: REHABILITATION | Facility: HOSPITAL | Age: 57
End: 2022-11-29
Payer: MEDICARE

## 2022-11-29 DIAGNOSIS — M25.651 DECREASED RANGE OF RIGHT HIP MOVEMENT: ICD-10-CM

## 2022-11-29 DIAGNOSIS — R26.9 GAIT ABNORMALITY: Primary | ICD-10-CM

## 2022-11-29 PROCEDURE — 97110 THERAPEUTIC EXERCISES: CPT | Mod: PN

## 2022-11-29 PROCEDURE — 97161 PT EVAL LOW COMPLEX 20 MIN: CPT | Mod: PN

## 2022-11-29 NOTE — PROGRESS NOTES
[facility]  Physical Therapy Initial Evaluation    Date: 11/29/2022   Name: Fariba Gamez  Clinic Number: [id number]    Therapy Diagnosis:   Encounter Diagnoses   Name Primary?    Gait abnormality Yes    Decreased range of right hip movement      Physician: Sadiq Ramirez NP    Physician Orders: PT Eval and Treat   Medical Diagnosis from Referral: M70.61 (ICD-10-CM) - Trochanteric bursitis of right hip  Evaluation Date: 11/29/2022  Authorization Period Expiration: 12/31/2022  Plan of Care Expiration: 1/10/2023  Visit # / Visits authorized: 1/1    Time In: 815a (pt 15 min late)   Time Out: 855a  Total Appointment Time (timed & untimed codes): 40 minutes    Precautions: Standard    Subjective   Date of onset: a few months   History of current condition - Fariba reports: she has gradually developed R hip pain. Pt is located on the side of the hip. She states the pain is worse sometimes with walking but mostly laying on her side. She states that sometimes has stiffness in the R hip in the morning. She denies any back pain or numbness/tingling down the leg. Denies radiating pain.      Medical History:   Past Medical History:   Diagnosis Date    Alcohol induced acute pancreatitis     Alcoholic cirrhosis of liver without ascites     Alcoholic liver disease     Gastric ulcer, unspecified as acute or chronic, without hemorrhage or perforation     Hyperlipidemia     Hypertension     Left-sided low back pain with left-sided sciatica     Leucopenia 9/12/2018    Normochromic normocytic anemia 9/12/2018    Pancreatic cancer 9/12/2018    Pancreatic mass 9/12/2018    Pancreatitis     Stomach ulcer     Thrombocytopenia 9/12/2018    Unspecified cirrhosis of liver        Surgical History:   Fariba Gamez  has a past surgical history that includes Esophagogastroduodenoscopy; Partial hysterectomy; Endoscopic ultrasound of upper gastrointestinal tract (N/A, 8/19/2022); and Colonoscopy (N/A,  8/19/2022).    Medications:   Fariba has a current medication list which includes the following prescription(s): amlodipine, aspirin, atorvastatin, carvedilol, carvedilol, cetirizine, creon, fenofibrate, hydralazine, hydralazine, lisinopril, magnesium oxide, omeprazole, pantoprazole, potassium chloride, and spironolactone.    Allergies:   Review of patient's allergies indicates:   Allergen Reactions    Opioids - morphine analogues Itching        Imaging: none    Prior Therapy: N  Social History:  lives with their family  Prior Level of Function: I  Current Level of Function: I; increased R hip pain    Pain:  Current 5/10, worst 7/10, best 3/10   Location: right hip   Description: Aching and Burning  Aggravating Factors: Standing, Walking, and Morning  Easing Factors: rest    Pt's goals: decrease pain    Objective     Posture: pelvis shifted to the L       Gait pattern:  Antalgic, Compensated Trendelenburg gait    Hip Range of Motion: limited flexion (100 degrees) / extension (5 degrees) on R     Hip Joint Mobility: decreased hip mobility on R       Lumbar ROM: no reproduction of symptoms with cardinal plane ROM       Lower Extremity Strength    Active SLR:     Right LE  Left LE    Knee extension: 4-/5 Knee extension: 4-/5   Hip extension:  3+/5; increased back extensors firing Hip extension: 3+/5; increased back extensor firing   Hip abduction: 3-/5 Hip abduction: 3+/5       Special Tests:    FADER resisted: (+)   FADIR: (+)   Slump : (-)    Palpation: tender lateral hip at the greater trochanter    Functional Testing:     Balance Assessment:       Evaluation   Single Limb Stance R LE 4s; painful  (<10 sec = HIGH FALL RISK)   Single Limb Stance L LE 10s  (<10 sec = HIGH FALL RISK)       Limitation/Restriction for FOTO Hip Survey    Therapist reviewed FOTO scores for Fariba Gamez on 11/29/2022.   FOTO documents entered into [facility] - see Media section.    Limitation Score: 24%         TREATMENT   Treatment Time In:  840a  Treatment Time Out: 855a  Total Treatment time (time-based codes) separate from Evaluation: 15 minutes    Fariba received therapeutic exercises to develop strength and endurance for 10 minutes including:    Glute Med Isometrics 5 x 45s @ 70% max force with 2 min rest between each set:    LAQ/SLR between rounds   Clamshells x10   Education - HEP / POC     Fariba received the following manual therapy techniques: Joint mobilizations were applied to the: R hip for 5 minutes, including:    Long axis hip distraction     Home Exercises and Patient Education Provided    Education provided:   - HEP    Written Home Exercises Provided: yes.  Exercises were reviewed and Fariba was able to demonstrate them prior to the end of the session.  Fariba demonstrated good  understanding of the education provided.     See EMR under Patient Instructions for exercises provided 11/29/2022.    Assessment   Fariba is a 57 y.o. female referred to outpatient Physical Therapy with a medical diagnosis of Trochanteric bursitis of right hip. Pt presents with decreased hip ROM, BLE weakness, and functional limitations of difficulty walking and standing prolonged periods of time. Pt presents with s/s largely consistent with gluteus medius tendinopathy. No pain with SLB following isometrics. Pt would benefit from skilled PT to improve function and address her impairments.     Pt prognosis is Good.   Pt will benefit from skilled outpatient Physical Therapy to address the deficits stated above and in the chart below, provide pt/family education, and to maximize pt's level of independence.     Plan of care discussed with patient: Yes  Pt's spiritual, cultural and educational needs considered and patient is agreeable to the plan of care and goals as stated below:     Anticipated Barriers for therapy: none    Medical Necessity is demonstrated by the following  History  Co-morbidities and personal factors that may impact the plan of care Co-morbidities:    HTN    Personal Factors:   age     low   Examination  Body Structures and Functions, activity limitations and participation restrictions that may impact the plan of care Body Regions:   lower extremities    Body Systems:    gross symmetry  ROM  strength  gross coordinated movement  balance  gait  transfers  motor control    Participation Restrictions:   none    Activity limitations:   no deficits    General Tasks and Commands  no deficits    Communication  no deficits    Mobility  walking    Self care  no deficits    Domestic Life  doing house work (cleaning house, washing dishes, laundry)  assisting others    Interactions/Relationships  no deficits    Life Areas  no deficits    Community and Social Life  no deficits         low   Clinical Presentation stable and uncomplicated low   Decision Making/ Complexity Score: low     Goals:  GOALS: Short Term Goals: 4 weeks  1. Increase hip ROM to full pain free in order to be able to perform ADLs without difficulty.  2. Increase strength by 1/3 MMT grade in glute med strength to increase tolerance for gait and work activities.  3. Pt to tolerate HEP to improve ROM and independence with ADL's     Long Term Goals: 6 weeks  Pt will report improvement to </= predicted FOTO score to demonstrate subjective improvement   2.Patient goal: return to work and walking  pain free  3.Increase strength to >/= 4+/5 in quad/hip  to increase tolerance for ADL and work activities.    Plan   Plan of care Certification: 11/29/2022 to 1/10/2022.    Outpatient Physical Therapy 2 times weekly for 6 weeks to include the following interventions: Gait Training, Manual Therapy, Moist Heat/ Ice, Neuromuscular Re-ed, Patient Education, Self Care, Therapeutic Activities, and Therapeutic Exercise.     Alonso Laurent, PT

## 2022-12-09 ENCOUNTER — HOSPITAL ENCOUNTER (EMERGENCY)
Facility: HOSPITAL | Age: 57
Discharge: HOME | End: 2022-12-09
Attending: STUDENT IN AN ORGANIZED HEALTH CARE EDUCATION/TRAINING PROGRAM
Payer: MEDICARE

## 2022-12-09 VITALS
HEIGHT: 64 IN | DIASTOLIC BLOOD PRESSURE: 85 MMHG | RESPIRATION RATE: 24 BRPM | HEART RATE: 54 BPM | WEIGHT: 126 LBS | SYSTOLIC BLOOD PRESSURE: 151 MMHG | OXYGEN SATURATION: 98 % | BODY MASS INDEX: 21.51 KG/M2 | TEMPERATURE: 98 F

## 2022-12-09 DIAGNOSIS — R10.13 EPIGASTRIC ABDOMINAL PAIN: ICD-10-CM

## 2022-12-09 DIAGNOSIS — M54.50 LOW BACK PAIN: Primary | ICD-10-CM

## 2022-12-09 LAB
ALBUMIN SERPL BCP-MCNC: 4.1 G/DL (ref 3.5–5.2)
ALP SERPL-CCNC: 74 U/L (ref 55–135)
ALT SERPL W/O P-5'-P-CCNC: 10 U/L (ref 10–44)
ANION GAP SERPL CALC-SCNC: 10 MMOL/L (ref 8–16)
AST SERPL-CCNC: 16 U/L (ref 10–40)
BASOPHILS # BLD AUTO: 0.07 K/UL (ref 0–0.2)
BASOPHILS NFR BLD: 0.9 % (ref 0–1.9)
BILIRUB SERPL-MCNC: 0.5 MG/DL (ref 0.1–1)
BILIRUB UR QL STRIP: NEGATIVE
BNP SERPL-MCNC: 31 PG/ML (ref 0–99)
BUN SERPL-MCNC: 11 MG/DL (ref 6–20)
CALCIUM SERPL-MCNC: 9.4 MG/DL (ref 8.7–10.5)
CHLORIDE SERPL-SCNC: 103 MMOL/L (ref 95–110)
CLARITY UR: CLEAR
CO2 SERPL-SCNC: 22 MMOL/L (ref 23–29)
COLOR UR: YELLOW
CREAT SERPL-MCNC: 0.8 MG/DL (ref 0.5–1.4)
DIFFERENTIAL METHOD: ABNORMAL
EOSINOPHIL # BLD AUTO: 0.2 K/UL (ref 0–0.5)
EOSINOPHIL NFR BLD: 2.8 % (ref 0–8)
ERYTHROCYTE [DISTWIDTH] IN BLOOD BY AUTOMATED COUNT: 12.9 % (ref 11.5–14.5)
EST. GFR  (NO RACE VARIABLE): >60 ML/MIN/1.73 M^2
GLUCOSE SERPL-MCNC: 166 MG/DL (ref 70–110)
GLUCOSE UR QL STRIP: NEGATIVE
HCT VFR BLD AUTO: 37.8 % (ref 37–48.5)
HGB BLD-MCNC: 13.3 G/DL (ref 12–16)
HGB UR QL STRIP: NEGATIVE
IMM GRANULOCYTES # BLD AUTO: 0.01 K/UL (ref 0–0.04)
IMM GRANULOCYTES NFR BLD AUTO: 0.1 % (ref 0–0.5)
KETONES UR QL STRIP: NEGATIVE
LEUKOCYTE ESTERASE UR QL STRIP: NEGATIVE
LIPASE SERPL-CCNC: 36 U/L (ref 4–60)
LYMPHOCYTES # BLD AUTO: 2.5 K/UL (ref 1–4.8)
LYMPHOCYTES NFR BLD: 33.2 % (ref 18–48)
MCH RBC QN AUTO: 32 PG (ref 27–31)
MCHC RBC AUTO-ENTMCNC: 35.2 G/DL (ref 32–36)
MCV RBC AUTO: 91 FL (ref 82–98)
MONOCYTES # BLD AUTO: 0.6 K/UL (ref 0.3–1)
MONOCYTES NFR BLD: 7.9 % (ref 4–15)
NEUTROPHILS # BLD AUTO: 4.1 K/UL (ref 1.8–7.7)
NEUTROPHILS NFR BLD: 55.1 % (ref 38–73)
NITRITE UR QL STRIP: NEGATIVE
NRBC BLD-RTO: 0 /100 WBC
PH UR STRIP: 6 [PH] (ref 5–8)
PLATELET # BLD AUTO: 234 K/UL (ref 150–450)
PMV BLD AUTO: 9.3 FL (ref 9.2–12.9)
POTASSIUM SERPL-SCNC: 3.5 MMOL/L (ref 3.5–5.1)
PROT SERPL-MCNC: 7.4 G/DL (ref 6–8.4)
PROT UR QL STRIP: NEGATIVE
RBC # BLD AUTO: 4.16 M/UL (ref 4–5.4)
SODIUM SERPL-SCNC: 135 MMOL/L (ref 136–145)
SP GR UR STRIP: 1.01 (ref 1–1.03)
TROPONIN I SERPL HS-MCNC: 2.7 PG/ML (ref 0–14.9)
URN SPEC COLLECT METH UR: NORMAL
UROBILINOGEN UR STRIP-ACNC: NEGATIVE EU/DL
WBC # BLD AUTO: 7.37 K/UL (ref 3.9–12.7)

## 2022-12-09 PROCEDURE — 83880 ASSAY OF NATRIURETIC PEPTIDE: CPT | Performed by: STUDENT IN AN ORGANIZED HEALTH CARE EDUCATION/TRAINING PROGRAM

## 2022-12-09 PROCEDURE — 84484 ASSAY OF TROPONIN QUANT: CPT | Performed by: STUDENT IN AN ORGANIZED HEALTH CARE EDUCATION/TRAINING PROGRAM

## 2022-12-09 PROCEDURE — 83690 ASSAY OF LIPASE: CPT | Performed by: STUDENT IN AN ORGANIZED HEALTH CARE EDUCATION/TRAINING PROGRAM

## 2022-12-09 PROCEDURE — 25000003 PHARM REV CODE 250: Performed by: STUDENT IN AN ORGANIZED HEALTH CARE EDUCATION/TRAINING PROGRAM

## 2022-12-09 PROCEDURE — 99284 EMERGENCY DEPT VISIT MOD MDM: CPT | Mod: 25

## 2022-12-09 PROCEDURE — 81003 URINALYSIS AUTO W/O SCOPE: CPT | Performed by: STUDENT IN AN ORGANIZED HEALTH CARE EDUCATION/TRAINING PROGRAM

## 2022-12-09 PROCEDURE — 80053 COMPREHEN METABOLIC PANEL: CPT | Performed by: STUDENT IN AN ORGANIZED HEALTH CARE EDUCATION/TRAINING PROGRAM

## 2022-12-09 PROCEDURE — 85025 COMPLETE CBC W/AUTO DIFF WBC: CPT | Performed by: STUDENT IN AN ORGANIZED HEALTH CARE EDUCATION/TRAINING PROGRAM

## 2022-12-09 RX ORDER — LIDOCAINE HYDROCHLORIDE 20 MG/ML
15 SOLUTION OROPHARYNGEAL ONCE
Status: COMPLETED | OUTPATIENT
Start: 2022-12-09 | End: 2022-12-09

## 2022-12-09 RX ORDER — DICYCLOMINE HYDROCHLORIDE 20 MG/1
20 TABLET ORAL 2 TIMES DAILY
Qty: 60 TABLET | Refills: 0 | Status: SHIPPED | OUTPATIENT
Start: 2022-12-09 | End: 2023-01-08

## 2022-12-09 RX ORDER — FAMOTIDINE 20 MG/1
20 TABLET, FILM COATED ORAL 2 TIMES DAILY
Qty: 60 TABLET | Refills: 0 | Status: SHIPPED | OUTPATIENT
Start: 2022-12-09 | End: 2023-01-08

## 2022-12-09 RX ORDER — ACETAMINOPHEN 500 MG
1000 TABLET ORAL
Status: COMPLETED | OUTPATIENT
Start: 2022-12-09 | End: 2022-12-09

## 2022-12-09 RX ORDER — MAG HYDROX/ALUMINUM HYD/SIMETH 200-200-20
30 SUSPENSION, ORAL (FINAL DOSE FORM) ORAL ONCE
Status: COMPLETED | OUTPATIENT
Start: 2022-12-09 | End: 2022-12-09

## 2022-12-09 RX ADMIN — ACETAMINOPHEN 1000 MG: 500 TABLET ORAL at 12:12

## 2022-12-09 RX ADMIN — ALUMINUM HYDROXIDE, MAGNESIUM HYDROXIDE, AND SIMETHICONE 30 ML: 200; 200; 20 SUSPENSION ORAL at 02:12

## 2022-12-09 RX ADMIN — LIDOCAINE HYDROCHLORIDE 15 ML: 20 SOLUTION ORAL; TOPICAL at 02:12

## 2022-12-09 NOTE — DISCHARGE INSTRUCTIONS
Please return to the Emergency Department for any new or worsening symptoms including: worsening abdominal pain, dark\black\bloody bowel movements, vomiting blood, hard abdomen, fever, chest pain, shortness of breath, loss of consciousness or any other concerns.    Thank you for coming to our Emergency Department today. It is important to remember that some problems or medical conditions are difficult to diagnose and may not be found during your Emergency Department visit.     Be sure to follow up with your primary care doctor and review all labs/imaging/tests that were performed during your ER visit with them. Some labs/tests may be outside of the normal range and require non-emergent follow-up and further investigation to help diagnose/exclude/prevent complications or other potentially serious medical conditions that were not addressed during your ER visit.    If you do not have a primary care doctor, you may contact the one listed on your discharge paperwork or you may also call the [facility] Appointment Desk at [phone] to schedule an appointment and establish care with one. Another resources for finding primary care physicians: [email or web address] It is important to your health that you have a primary care doctor.    Please take all medications as directed. All medications may potentially have side-effects and it is impossible to predict which medications may give you side-effects or what side-effects (if any) they will give you. If you feel that you are having a negative effect or side-effect of any medication you should immediately stop taking them and seek medical attention. If you feel that you are having a life-threatening reaction call 911.    Return to the ER with any questions/concerns, new/concerning symptoms, worsening or failure to improve.     Do not drive, swim, climb to height, take a bath, operate heavy machinery, drink alcohol or take potentially sedating medications, sign any legal  documents or make any important decisions for 24 hours if you have received any pain medications, sedatives or mood altering drugs during your ER visit or within 24 hours of taking them if they have been prescribed to you.     You can find additional resources for Dentists, hearing aids, durable medical equipment, low cost pharmacies and other resources at [email or web address]

## 2022-12-09 NOTE — ED PROVIDER NOTES
Encounter Date: 12/9/2022       History     Chief Complaint   Patient presents with    Abdominal Pain     Mid upper quadrant abd pain radiating to L flank area since thanksgiving - denies n/v/d      57 year old female presents for intermittent mid epigastric pain radiating to the left side 'back to the kidney', ongoing since thanksgiving, relieved temporarily by OTC medication, last use yesterday, and she's not sure what makes it worse. Sometimes movement makes it worse, and she thinks she pulled a muscle, but it hasn't gotten better. She also ahs a history of alcoholic pancreatitis but hasn't had a drink in a year. She denies nausea, vomiting or change in symptoms with food. She has no change in bowel movements. She denies dysuria, hematuria, urgency or frequency.     Review of patient's allergies indicates:   Allergen Reactions    Opioids - morphine analogues Itching     Past Medical History:   Diagnosis Date    Alcohol induced acute pancreatitis     Alcoholic cirrhosis of liver without ascites     Alcoholic liver disease     Gastric ulcer, unspecified as acute or chronic, without hemorrhage or perforation     Hyperlipidemia     Hypertension     Left-sided low back pain with left-sided sciatica     Leucopenia 9/12/2018    Normochromic normocytic anemia 9/12/2018    Pancreatic cancer 9/12/2018    Pancreatic mass 9/12/2018    Pancreatitis     Stomach ulcer     Thrombocytopenia 9/12/2018    Unspecified cirrhosis of liver      Past Surgical History:   Procedure Laterality Date    COLONOSCOPY N/A 8/19/2022    Procedure: COLONOSCOPY;  Surgeon: Simon Corrigan III, MD;  Location: [facility];  Service: Endoscopy;  Laterality: N/A;    ENDOSCOPIC ULTRASOUND OF UPPER GASTROINTESTINAL TRACT N/A 8/19/2022    Procedure: ULTRASOUND, UPPER GI TRACT, ENDOSCOPIC;  Surgeon: Simon Corrigan III, MD;  Location: [facility];  Service: Endoscopy;  Laterality: N/A;    ESOPHAGOGASTRODUODENOSCOPY      PARTIAL HYSTERECTOMY       Family  History   Problem Relation Age of Onset    Hypertension Mother     Kidney failure Mother     Hypertension Father     Heart attack Father      Social History     Tobacco Use    Smoking status: Never    Smokeless tobacco: Never   Substance Use Topics    Alcohol use: Yes     Alcohol/week: 2.0 standard drinks     Types: 2 Cans of beer per week     Comment: socially    Drug use: Yes     Frequency: 7.0 times per week     Types: Marijuana     Comment: daily     Review of Systems   Constitutional:  Negative for activity change, appetite change, chills, fever and unexpected weight change.   HENT:  Negative for dental problem and drooling.    Eyes:  Negative for discharge and itching.   Respiratory:  Negative for cough, chest tightness, shortness of breath, wheezing and stridor.    Cardiovascular:  Negative for chest pain, palpitations and leg swelling.   Gastrointestinal:  Positive for abdominal pain. Negative for abdominal distention, diarrhea and nausea.   Genitourinary:  Negative for difficulty urinating, dysuria, frequency and urgency.   Musculoskeletal:  Positive for back pain. Negative for gait problem and joint swelling.   Neurological:  Negative for dizziness, syncope, numbness and headaches.   Psychiatric/Behavioral:  Negative for agitation, behavioral problems and confusion.      Physical Exam     Initial Vitals [12/09/22 1107]   BP Pulse Resp Temp SpO2   (!) 173/96 (!) 53 18 98.1 °F (36.7 °C) 99 %      MAP       --         Physical Exam    Nursing note and vitals reviewed.  Constitutional: She appears well-developed and well-nourished. She is not diaphoretic.   HENT:   Head: Normocephalic and atraumatic.   Mouth/Throat: Oropharynx is clear and moist.   Eyes: EOM are normal. Pupils are equal, round, and reactive to light. Right eye exhibits no discharge. Left eye exhibits no discharge.   Neck: No tracheal deviation present.   Normal range of motion.  Cardiovascular:  Normal rate, regular rhythm and intact distal  pulses.           Pulmonary/Chest: No respiratory distress. She has no wheezes. She exhibits no tenderness.   Abdominal: Abdomen is soft. She exhibits no distension. There is no abdominal tenderness.   Musculoskeletal:         General: No tenderness or edema. Normal range of motion.      Cervical back: Normal range of motion.     Neurological: She is alert and oriented to person, place, and time. She has normal strength. No cranial nerve deficit or sensory deficit. GCS eye subscore is 4. GCS verbal subscore is 5. GCS motor subscore is 6.   Skin: Skin is warm and dry. No rash noted.   Psychiatric: She has a normal mood and affect. Her behavior is normal. Thought content normal.       ED Course   Procedures  Labs Reviewed   CBC W/ AUTO DIFFERENTIAL - Abnormal; Notable for the following components:       Result Value    MCH 32.0 (*)     All other components within normal limits   COMPREHENSIVE METABOLIC PANEL - Abnormal; Notable for the following components:    Sodium 135 (*)     CO2 22 (*)     Glucose 166 (*)     All other components within normal limits   LIPASE   URINALYSIS, REFLEX TO URINE CULTURE    Narrative:     Specimen Source->Urine   TROPONIN I HIGH SENSITIVITY   B-TYPE NATRIURETIC PEPTIDE          Imaging Results              X-Ray Chest PA And Lateral (Final result)  Result time 12/09/22 11:47:04      Final result by Oseas Grissom MD (12/09/22 11:47:04)                   Narrative:    CHEST PA AND LATERAL    CLINICAL DATA:Chest pain. Comparison August 8, 2022.    FINDINGS: PA and lateral views demonstrate no cardiac, pulmonary, or osseous abnormalities.    IMPRESSION:  1. Normal two-view chest.    Electronically signed by:  Oseas Grissom MD  12/9/2022 11:47 AM [facility] Workstation: [id number]                                     Medications   acetaminophen tablet 1,000 mg (1,000 mg Oral Given 12/9/22 1236)   aluminum-magnesium hydroxide-simethicone 200-200-20 mg/5 mL suspension 30 mL (30 mLs Oral Given  12/9/22 1416)     And   LIDOcaine HCl 2% oral solution 15 mL (15 mLs Oral Given 12/9/22 1416)                 ED Course as of 12/09/22 1945   Fri Dec 09, 2022   1221 EKG with sinus bradycardia, regular rhythm, normal axis, no acute ST elevations or depressions, normal NC, QRS and QT interval. Interpreted by me.   [BS]   1222 I reviewed the CXR independently of the radiologist.     Chest x-ray was negative for acute cardiopulmonary abnormalities, infiltrates or bony abnormalities.     [BS]   1315 Brain natriuretic peptide [BS]   1315 TROPONIN I HIGH SENSITIVITY [BS]   1315 Urinalysis, Reflex to Urine Culture Urine, Clean Catch [BS]   1315 Lipase [BS]   1315 Comp. Metabolic Panel(!) [BS]   1315 CBC W/ AUTO DIFFERENTIAL(!) [BS]      ED Course User Index  [BS] Ammon Zhao MD               After complete evaluation, including thorough history and physical exam, the patient's symptoms are most consistent with benign cause of abdominal pain. There is no rebound/guarding or other peritoneal signs to suggest perforation or other emergent surgical process. There is no fever or leukocytosis to suggest acute bacterial infection. There is no significant focal abdominal tenderness to suggest cholecystitis, appendicitis, diverticulitis, or  source, and the patient's current symptoms and clinical presentation do not warrant other targeted diagnostics at this time. CT A/P unlikely to outweigh risks of contrast/radiation at this time.  EKG without acute ischemic changes, troponin within normal limits, doubt atypical ACS.  BNP within normal limits, doubt heart failure.  Lipase within normal limits, doubt pancreatitis.    The patient was treated with supportive care and improved. Will provide RX for pepcid and carafate upon D/C.    Clinical Impression:   Final diagnoses:  [R10.13] Epigastric abdominal pain        ED Disposition Condition    Discharge Stable          ED Prescriptions       Medication Sig Dispense Start Date End  Date Auth. Provider    dicyclomine (BENTYL) 20 mg tablet Take 1 tablet (20 mg total) by mouth 2 (two) times daily. 60 tablet 12/9/2022 1/8/2023 Ammon Zhao MD    famotidine (PEPCID) 20 MG tablet Take 1 tablet (20 mg total) by mouth 2 (two) times daily. 60 tablet 12/9/2022 1/8/2023 Ammon Zhao MD          Follow-up Information       Follow up With Specialties Details Why Contact Info    Tip Cummings MD Gastroenterology Call today To set up a follow-up appointment [address] [facility] [address]  [phone]               Ammon Zhao MD  12/09/22 1945

## 2022-12-13 ENCOUNTER — CLINICAL SUPPORT (OUTPATIENT)
Dept: REHABILITATION | Facility: HOSPITAL | Age: 57
End: 2022-12-13
Payer: MEDICARE

## 2022-12-13 ENCOUNTER — DOCUMENTATION ONLY (OUTPATIENT)
Dept: REHABILITATION | Facility: HOSPITAL | Age: 57
End: 2022-12-13

## 2022-12-13 DIAGNOSIS — M25.651 DECREASED RANGE OF RIGHT HIP MOVEMENT: ICD-10-CM

## 2022-12-13 DIAGNOSIS — R26.9 GAIT ABNORMALITY: Primary | ICD-10-CM

## 2022-12-13 PROCEDURE — 97110 THERAPEUTIC EXERCISES: CPT | Mod: PN,CQ

## 2022-12-13 NOTE — PROGRESS NOTES
"[facility]   Physical Therapy Treatment Note     Name: Fariba Gamez  Clinic Number: [id number]    Therapy Diagnosis: No diagnosis found.  Physician: Sadiq Ramirez NP    Visit Date: 12/13/2022    Physician Orders: PT Eval and Treat   Medical Diagnosis from Referral: M70.61 (ICD-10-CM) - Trochanteric bursitis of right hip  Evaluation Date: 11/29/2022  Authorization Period Expiration: 12/31/2022  Plan of Care Expiration: 1/10/2023  Visit # / Visits authorized: 1/12    PTA Visit #: 1/5     Time In: 0910  Time Out: 1050  Total Billable Time: 30 minutes    SUBJECTIVE     Pt reports: Reports compliance with HEP. "Its just a little sore today."   She was compliant with home exercise program.  Response to previous treatment: first visit post initial evaluation   Functional change: non-remarkable    Pain: 0/10  Location: right hip      OBJECTIVE     Objective Measures updated at progress report unless specified.     Treatment     Physical Therapy technician assisted with treatment under direct supervision of treating therapist. Patient received 1:1 treatments for 30 minutes     Fariba received the treatments listed below:      therapeutic exercises to develop strength, endurance, ROM, and flexibility for 45 minutes including:    Phase 2:  Sidelying hip abduction with 3 pounds 4 x 8 repetitions   Sidelying clams blue theraband 4 x 8 repetitions   Single leg bridges 4 x 8 repetitions   Donkey kicks 25 pounds 4 x 8 repetitions   Shuttle double leg 100 pounds 4 x 8 repetitions   Shuttle single leg 75 pounds 4 x 8 repetitions   Lateral stepping green theraband 10 yards x 3 reps        Patient Education and Home Exercises     Home Exercises Provided and Patient Education Provided     Education provided:   - HEP    Written Home Exercises Provided: yes. Exercises were reviewed and Fariba was able to demonstrate them prior to the end of the session.  Fariba demonstrated good  understanding of the education " provided. See EMR under Patient Instructions for exercises provided during therapy sessions    ASSESSMENT     Due to absence of pain upon arrival, treatment was focused on phase 2 of the tendon loading program. She was able to tolerate well. She experienced mild increase in pain with sidelying hip abduction but subsided with rest and didn't increase. She was able to tolerate all other exercises without issue. Fariba will continue to benefit from skilled Physical Therapy to improve strength deficits to allow her to return to prior level of function without pain or limitations.     Fariba Is progressing well towards her goals.   Pt prognosis is Good.     Pt will continue to benefit from skilled outpatient physical therapy to address the deficits listed in the problem list box on initial evaluation, provide pt/family education and to maximize pt's level of independence in the home and community environment.     Pt's spiritual, cultural and educational needs considered and pt agreeable to plan of care and goals.     Anticipated barriers to physical therapy: none    Goals:     GOALS: Short Term Goals: 4 weeks In progress 12/13/2022  1. Increase hip ROM to full pain free in order to be able to perform ADLs without difficulty.  2. Increase strength by 1/3 MMT grade in glute med strength to increase tolerance for gait and work activities.  3. Pt to tolerate HEP to improve ROM and independence with ADL's     Long Term Goals: 6 weeks  Pt will report improvement to </= predicted FOTO score to demonstrate subjective improvement   2.Patient goal: return to work and walking  pain free  3.Increase strength to >/= 4+/5 in quad/hip  to increase tolerance for ADL and work activities.    PLAN     Progress strengthening and tendon loading per tolerance.       Hien Metzger, PTA

## 2022-12-27 ENCOUNTER — CLINICAL SUPPORT (OUTPATIENT)
Dept: REHABILITATION | Facility: HOSPITAL | Age: 57
End: 2022-12-27
Payer: MEDICAID

## 2022-12-27 DIAGNOSIS — R26.9 GAIT ABNORMALITY: Primary | ICD-10-CM

## 2022-12-27 DIAGNOSIS — M25.651 DECREASED RANGE OF RIGHT HIP MOVEMENT: ICD-10-CM

## 2022-12-27 PROCEDURE — 97110 THERAPEUTIC EXERCISES: CPT | Mod: PN,CQ

## 2022-12-27 NOTE — PROGRESS NOTES
[facility]   Physical Therapy Treatment Note     Name: Fariba Gamez  Clinic Number: [id number]    Therapy Diagnosis:   Encounter Diagnoses   Name Primary?    Gait abnormality Yes    Decreased range of right hip movement      Physician: Sadiq Ramirez NP    Visit Date: 12/27/2022    Physician Orders: PT Eval and Treat   Medical Diagnosis from Referral: M70.61 (ICD-10-CM) - Trochanteric bursitis of right hip  Evaluation Date: 11/29/2022  Authorization Period Expiration: 12/31/2022  Plan of Care Expiration: 1/10/2023  Visit # / Visits authorized: 2/12    PTA Visit #: 2/5     Time In: 1600  Time Out: 1700  Total Billable Time: 30 minutes    SUBJECTIVE     Pt reports: She hasn't been here in 2 weeks due to issues with transportation. Reports her symptoms are much better only has mild pain intermittently.      She was compliant with home exercise program.  Response to previous treatment: first visit post initial evaluation   Functional change: non-remarkable    Pain: 0/10  Location: right hip      OBJECTIVE     Objective Measures updated at progress report unless specified.     Treatment     Physical Therapy technician assisted with treatment under direct supervision of treating therapist. Patient received 1:1 treatments for 30 minutes     Fariba received the treatments listed below:      therapeutic exercises to develop strength, endurance, ROM, and flexibility for 45 minutes including:    Phase 2:  Sidelying hip abduction with 3 pounds 4 x 8 repetitions   Sidelying clams blue theraband 4 x 8 repetitions   Single leg bridges 4 x 8 repetitions   Donkey kicks 25 pounds 4 x 8 repetitions   Shuttle double leg 100 pounds 4 x 8 repetitions   Shuttle single leg 75 pounds 4 x 8 repetitions   Lateral stepping green theraband 10 yards x 3 reps   Sidelying pretzels 4 x 8 repetitions 2 pound ankle weight        Patient Education and Home Exercises     Home Exercises Provided and Patient Education Provided      Education provided:   - HEP    Written Home Exercises Provided: yes. Exercises were reviewed and Fariba was able to demonstrate them prior to the end of the session.  Fariba demonstrated good  understanding of the education provided. See EMR under Patient Instructions for exercises provided during therapy sessions    ASSESSMENT     Patient is progressing well in therapy with improved symptomology. Advanced Home Exercise Program due to reports of difficulties with transportation and struggling to be consistent in therapy. Will continue to progress as able.     Fariba Is progressing well towards her goals.   Pt prognosis is Good.     Pt will continue to benefit from skilled outpatient physical therapy to address the deficits listed in the problem list box on initial evaluation, provide pt/family education and to maximize pt's level of independence in the home and community environment.     Pt's spiritual, cultural and educational needs considered and pt agreeable to plan of care and goals.     Anticipated barriers to physical therapy: none    Goals:     GOALS: Short Term Goals: 4 weeks In progress 12/27/2022  1. Increase hip ROM to full pain free in order to be able to perform ADLs without difficulty.  2. Increase strength by 1/3 MMT grade in glute med strength to increase tolerance for gait and work activities.  3. Pt to tolerate HEP to improve ROM and independence with ADL's     Long Term Goals: 6 weeks  Pt will report improvement to </= predicted FOTO score to demonstrate subjective improvement   2.Patient goal: return to work and walking  pain free  3.Increase strength to >/= 4+/5 in quad/hip  to increase tolerance for ADL and work activities.    PLAN     Progress strengthening and tendon loading per tolerance.       Hien Metzger, PTA

## 2023-01-10 ENCOUNTER — CLINICAL SUPPORT (OUTPATIENT)
Dept: REHABILITATION | Facility: HOSPITAL | Age: 58
End: 2023-01-10
Payer: MEDICARE

## 2023-01-10 DIAGNOSIS — R26.9 GAIT ABNORMALITY: Primary | ICD-10-CM

## 2023-01-10 DIAGNOSIS — M25.651 DECREASED RANGE OF RIGHT HIP MOVEMENT: ICD-10-CM

## 2023-01-10 PROCEDURE — 97110 THERAPEUTIC EXERCISES: CPT | Mod: PN

## 2023-01-10 NOTE — PROGRESS NOTES
[facility]   Physical Therapy Treatment Note     Name: Fariba Gamez  Clinic Number: [id number]    Therapy Diagnosis:   Encounter Diagnoses   Name Primary?    Gait abnormality Yes    Decreased range of right hip movement        Physician: Sadiq Ramirez NP    Visit Date: 1/10/2023    Physician Orders: PT Eval and Treat   Medical Diagnosis from Referral: M70.61 (ICD-10-CM) - Trochanteric bursitis of right hip  Evaluation Date: 11/29/2022  Authorization Period Expiration: 12/31/2022  Plan of Care Expiration: 1/10/2023   Visit # / Visits authorized: 2/12 1/20    PTA Visit #: 2/5     Time In: 3:02  Time Out: 3:47  Total Billable Time: 45 minutes    SUBJECTIVE     Pt reports: She has been doing well and feels she is doing well. She would like today to be her last day.    She was compliant with home exercise program.  Response to previous treatment: first visit post initial evaluation   Functional change: non-remarkable    Pain: 0/10  Location: right hip      OBJECTIVE     Objective Measures updated at progress report unless specified.     Treatment     Physical Therapy technician assisted with treatment under direct supervision of treating therapist. Patient received 1:1 treatments for 30 minutes     Fariba received the treatments listed below:      therapeutic exercises to develop strength, endurance, ROM, and flexibility for 45 minutes including:    Phase 2:  Sidelying hip abduction with 3 pounds 4 x 8 repetitions   Sidelying clams blue theraband 4 x 8 repetitions   Single leg bridges 4 x 8 repetitions   Donkey kicks 25 pounds 4 x 8 repetitions   Shuttle double leg 100 pounds 4 x 8 repetitions   Shuttle single leg 75 pounds 4 x 8 repetitions   Lateral stepping green theraband 10 yards x 3 reps   Sidelying pretzels 4 x 8 repetitions 2 pound ankle weight    Sit to stands 4x8      Patient Education and Home Exercises     Home Exercises Provided and Patient Education Provided     Education  provided:   - HEP    Written Home Exercises Provided: yes. Exercises were reviewed and Fariba was able to demonstrate them prior to the end of the session.  Fariba demonstrated good  understanding of the education provided. See EMR under Patient Instructions for exercises provided during therapy sessions    ASSESSMENT     Patient has progressed well with reports that she has not been having symptoms very often. She has been having difficulty coming the physical therapy due to transportation. Pt was given updated HEP to continue her rehab at home. Pt tolerated her exercises well with no increase in symptoms after session.       Fariba Is progressing well towards her goals.   Pt prognosis is Good.     Pt will continue to benefit from skilled outpatient physical therapy to address the deficits listed in the problem list box on initial evaluation, provide pt/family education and to maximize pt's level of independence in the home and community environment.     Pt's spiritual, cultural and educational needs considered and pt agreeable to plan of care and goals.     Anticipated barriers to physical therapy: none    Goals:     GOALS: Short Term Goals: 4 weeks In progress 1/10/2023  1. Increase hip ROM to full pain free in order to be able to perform ADLs without difficulty. (MET)  2. Increase strength by 1/3 MMT grade in glute med strength to increase tolerance for gait and work activities. Not tested  3. Pt to tolerate HEP to improve ROM and independence with ADL's (MET)     Long Term Goals: 6 weeks  Pt will report improvement to </= predicted FOTO score to demonstrate subjective improvement (Not MET)  2.Patient goal: return to work and walking  pain free (MET)  3.Increase strength to >/= 4+/5 in quad/hip  to increase tolerance for ADL and work activities. (Not Tested)    PLAN     Progress strengthening and tendon loading per tolerance.       Cristian Calvert, PT

## 2023-03-08 DIAGNOSIS — I16.0 HYPERTENSIVE URGENCY: Primary | ICD-10-CM

## 2023-03-28 DIAGNOSIS — J45.909 UNSPECIFIED ASTHMA, UNCOMPLICATED: Primary | ICD-10-CM

## 2023-04-13 ENCOUNTER — TELEPHONE (OUTPATIENT)
Dept: HEMATOLOGY/ONCOLOGY | Facility: CLINIC | Age: 58
End: 2023-04-13

## 2023-04-13 DIAGNOSIS — K86.1 CHRONIC CALCIFIC PANCREATITIS: ICD-10-CM

## 2023-04-13 DIAGNOSIS — K86.89 PANCREATIC MASS: Primary | ICD-10-CM

## 2023-04-13 DIAGNOSIS — D72.819 LEUKOPENIA, UNSPECIFIED TYPE: ICD-10-CM

## 2023-04-13 DIAGNOSIS — D64.9 NORMOCHROMIC NORMOCYTIC ANEMIA: ICD-10-CM

## 2023-04-13 NOTE — TELEPHONE ENCOUNTER
----- Message from Josi Maxwell NP sent at 4/13/2023 11:57 AM CDT -----  All of it please.     ----- Message -----  From: Yane Watt RN  Sent: 4/13/2023  11:52 AM CDT  To: Josi Maxwell NP    Can you please review and let me know what labs she needs, not sure if just a CBC and CMP are good or if she needs CEA, CA19 and iron panel like she has had in the past.   ----- Message -----  From: Jennifer Peña  Sent: 4/13/2023  10:22 AM CDT  To: [facility] Staff    Pt will need lab orders put in before her appt on 06/18

## 2023-06-06 ENCOUNTER — TELEPHONE (OUTPATIENT)
Dept: HEMATOLOGY/ONCOLOGY | Facility: CLINIC | Age: 58
End: 2023-06-06

## 2023-06-13 ENCOUNTER — TELEPHONE (OUTPATIENT)
Dept: HEMATOLOGY/ONCOLOGY | Facility: CLINIC | Age: 58
End: 2023-06-13

## 2023-06-13 NOTE — TELEPHONE ENCOUNTER
Labs needed for appt eiclaudia HERBERT on 6/15/23  Pt said she is going to [facility] either today or tomorrow

## 2023-06-14 NOTE — PROGRESS NOTES
"[facility] Hematology/Oncology  PROGRESS NOTE       Subjective:       Patient ID:   NAME: Fariba Gamez : 1965     57 y.o. female    Referring Doc: Rut  Other Physicians: Shekhar Araya, Natty Cummings    Chief Complaint:  Pancreatic mass f/u    History of Present Illness:     Patient returns today for a regularly scheduled follow-up visit.  The patient is here today to go over the results of the recently ordered labs, tests and studies. She is here by herself today.        She reports that she has been feeling "good" ; she has been eating ok. She denies any CP, SOB, HA's or N/V. She started drinking again     She has repeat scopes with Dr Corrigan with  GI next week    She previously had had a pancreatic mass biopsy with Dr Cummings at [facility] on 3/14/2019.       Discussed covid19 precautions - she has not been vaccinated        ROS:   GEN: normal without any fever, night sweats or weight loss  HEENT: normal with no HA's, sore throat, stiff neck, changes in vision  CV: normal with no CP, SOB, PND, MARTIN or orthopnea  PULM: normal with no SOB, cough, hemoptysis, sputum or pleuritic pain  GI: normal with no abdominal pain, nausea, vomiting, constipation, diarrhea, melanotic stools, BRBPR, or hematemesis  : normal with no hematuria, dysuria  BREAST: normal with no mass, discharge, pain  SKIN: normal with no rash, erythema, bruising, or swelling    Allergies:  Review of patient's allergies indicates:  No Known Allergies    Medications:    Current Outpatient Medications:     amLODIPine (NORVASC) 5 MG tablet, Take 1 tablet (5 mg total) by mouth Daily., Disp: 90 tablet, Rfl: 0    aspirin (ECOTRIN) 81 MG EC tablet, Take 81 mg by mouth once daily., Disp: , Rfl:     atorvastatin (LIPITOR) 20 MG tablet, TAKE 1 TABLET(20 MG) BY MOUTH DAILY (Patient taking differently: Take 20 mg by mouth once daily.), Disp: 90 tablet, Rfl: 0    carvedilol (COREG) 3.125 MG tablet, Take 3.125 mg by mouth Daily., Disp: , Rfl:     " carvedilol (COREG) 6.25 MG tablet, TAKE 1 TABLET(6.25 MG) BY MOUTH DAILY, Disp: 90 tablet, Rfl: 0    cetirizine (ZYRTEC) 10 MG tablet, Take 10 mg by mouth once daily., Disp: , Rfl:     CREON CpDR, 1 capsule 3 (three) times daily with meals., Disp: , Rfl: 3    cyclobenzaprine (FLEXERIL) 10 MG tablet, Take 10 mg by mouth 3 (three) times daily., Disp: , Rfl:     ergocalciferol (ERGOCALCIFEROL) 50,000 unit Cap, ergocalciferol (vitamin D2) 1,250 mcg (50,000 unit) capsule, Disp: , Rfl:     fenofibrate (TRICOR) 48 MG tablet, Take 1 tablet (48 mg total) by mouth Daily., Disp: 90 tablet, Rfl: 0    hydrALAZINE (APRESOLINE) 50 MG tablet, Take 50 mg by mouth every 12 (twelve) hours., Disp: , Rfl: 0    hydrALAZINE (APRESOLINE) 50 MG tablet, Take 50 mg by mouth., Disp: , Rfl:     lisinopriL (PRINIVIL,ZESTRIL) 20 MG tablet, Take 20 mg by mouth once daily., Disp: , Rfl:     magnesium oxide (MAG-OX) 400 mg tablet, Take 1 tablet by mouth once daily., Disp: , Rfl: 3    omeprazole (PRILOSEC) 40 MG capsule, Take 40 mg by mouth every morning., Disp: , Rfl:     pantoprazole (PROTONIX) 40 MG tablet, Take 40 mg by mouth once daily., Disp: , Rfl: 0    potassium chloride (MICRO-K) 10 MEQ CpSR, Take 10 mEq by mouth once., Disp: , Rfl:     spironolactone (ALDACTONE) 25 MG tablet, Take 25 mg by mouth once daily., Disp: , Rfl: 3    famotidine (PEPCID) 20 MG tablet, Take 1 tablet (20 mg total) by mouth 2 (two) times daily., Disp: 60 tablet, Rfl: 0    PMHx/PSHx Updates:  See patient's last visit with me on 6/16/2022  See H&P on 9/12/2018        Pathology:  Cancer Staging  No matching staging information was found for the patient.    Pancreatic mass biopsy  3/14/2019:    Pancreas, core biopsy:  Scant specimen with mostly blood clot, and few entrapped pancreatic acini and partially preserved, superficial intestinal type mucosal fragments - insufficient for definitive diagnostic assessment.      Objective:     Vitals:  Blood pressure 114/79, pulse 64,  "temperature 97.7 °F (36.5 °C), resp. rate 16, height 5' 4" (1.626 m), weight 57.6 kg (127 lb).    Physical Examination:   GEN: no apparent distress, comfortable; AAOx3  HEAD: atraumatic and normocephalic  EYES: no pallor, no icterus, PERRLA  ENT: OMM, no pharyngeal erythema, external ears WNL; no nasal discharge; no thrush  NECK: no masses, thyroid normal, trachea midline, no LAD/LN's, supple  CV: RRR with no murmur; normal pulse; normal S1 and S2; mild pedal edema  CHEST: Normal respiratory effort; CTAB; normal breath sounds; no wheeze or crackles  ABDOM: nontender; soft; normal bowel sounds; no rebound/guarding; mildly distended   MUSC/Skeletal: ROM normal; no crepitus; joints normal; no deformities or arthropathy  EXTREM: no clubbing, cyanosis, inflammation or swelling; no current edema  SKIN: no rashes, lesions, ulcers, petechiae    or subcutaneous nodules  : no peña  NEURO: grossly intact; motor/sensory WNL; AAOx3; no tremors  PSYCH: normal mood, affect and behavior  LYMPH: normal cervical, supraclavicular, axillary and groin LN's            Labs:         WBC 4.8 - 10.8 neo/L 7.1    RBC 4.50 - 5.50 tril/L 4.18 Low     Hemoglobin 12.0 - 15.0 g/dL 13.4    Hematocrit 37.0 - 47.0 % 39.4    MCV 81 - 97 fL 94    MCH 27 - 32 pg 32    MCHC 32 - 36 g/dL 34    RDW 11.5 - 14.5 % 14.0    Platelet Count- Automated 150 - 400 neo/L 225    MPV 7.4 - 10.4 fL 7.1 Low     Granulocyte Relative 42.2 - 75.2 % 56.9    Lymphocytes Relative 20.5 - 51.1 % 28.7    Monocytes Relative 1.7 - 9.3 % 6.7    Eosinophils Relative 0.0 - <5.0 % 6.5 High     Basophils Relative 0.0 - <5.0 % 1.2    Granulocytes Absolute 1.4 - 6.5 K/UL 4.0    Lymphocyte Absolute 1.2 - 3.4 K/uL 2.0      Sodium 137 - 145 mmol/L 134 Low     Potassium 3.5 - 5.1 mmol/L 3.7    Chloride 98 - 107 mmol/L 99    CO2 22 - 30 mmol/L 24    BUN 9 - 20 mg/dL 7 Low     Creatinine 0.80 - 1.50 mg/dL 0.77 Low     Glucose 74 - 106 mg/dL 106    Calcium 8.2 - 10.0 mg/dL 9.9    Albumin " 3.5 - 5.0 g/dL 4.7    AST <=32 U/L 16    ALT <=33 U/L 11    Alkaline Phosphatase 38 - 126 U/L 84    Bilirubin, Total 0.2 - 1.3 mg/dL 0.6    Total Protein 6.3 - 8.2 g/dL 7.1    Anion Gap 3 - 15 mmol/L 11    A/G ratio 1.0 - 2.0 2.0    Globulin 2.0 - 4.0 g/dL 2.4        CEA 0.0 - 3.0 ng/mL 10.3     Iron 49 - 181 ug/dL 130    TIBC 250 - 450 ug/dL 320    Iron Saturation 15 - 50 % 41         Ferritin 13.00 - 150.00 ng/mL 222.00        Radiology/Diagnostic Studies:    US abdom  1/31/2022:    IMPRESSION:  1. Mild morphologic features of cirrhosis without mass to suggest hepatocellular carcinoma.  2. Cholelithiasis without acute cholecystitis.  3. Heterogeneous appearing pancreatic parenchyma with macroscopic calcification compatible with chronic pancreatitis. No ductal dilation or discrete mass is identified.  4. No ascites present.            Ct abdom  12/5/2019:  Impression       1. 11 mm heterogeneously enhancing structure of the head of the pancreas is again identified, slightly less conspicuous when compared with previous exam.  2. There is a new focus of hypoenhancement within the body of the pancreas measuring 9 mm in diameter, which may reflect a dilated side duct, or developing cystic lesion.  3. No interval change of 12 mm celiac axis lymph node.  4. Cholelithiasis.  5. Small hiatal hernia.               CT Abdom 2/7/2019    IMPRESSION:    1. Poorly defined round slightly heterogeneous focus on early arterial phase imaging in pancreatic head measuring up to 11 mm in size, occurring at site of previous cystic-appearing pancreatic mass on 02/03/2017 MRI. It is likely that  this has not significantly changed in size, although comparison on current CT imaging with that of prior MR imaging is suboptimal. In addition to the previously reported differential diagnosis, cystic islet cell tumor is an additional consideration. Considerations for further evaluation include  endoscopic ultrasound targeted to the pancreatic  head with potential tissue sampling of the potential persistent pancreatic head mass and/or repeat pancreatic protocol MRI abdomen without and with IV contrast for more precise comparison with the prior MRI.    2. New enlarged 12 mm celiac axis lymph node could either be reactive in nature due to infectious or inflammatory process or indicate neoplasm such as lymphoma  or metastatic disease. There is no additional evidence of metastatic disease or enlarged lymph nodes however. It is conceivable this too could be reassessed at time of potential endoscopic ultrasound, although if this is unable to be performed, short-term imaging follow-up with either CT or MRI of the abdomen would be helpful to evaluate for stability.    3. Resolution of acute pancreatitis since 07/23/2018, now with few scattered pancreatic parenchymal calcifications which could indicate sequelae of chronic  pancreatitis.    4. Cholelithiasis.    5. Mild bilateral urothelial enhancement can be seen with urinary tract infection or reflux.    6. 37 mm left ovarian cyst is new since 07/23/2018. This is statistically likely of benign etiology and may be physiologic in nature. Further evaluation with ultrasound pelvis in 12 weeks is suggested to evaluate for resolution.    7. Unchanged 4 mm right middle lobe nodule.      CXR  2/7/2019:    IMPRESSION: No acute intrathoracic abnormality seen              Ct Chest Abdoment Pelvis With Contrast    Result Date: 9/26/2018  EXAMINATION: CT CHEST ABDOMEN PELVIS WITH CONTRAST (XPD) CLINICAL HISTORY: panc mass; Disease of pancreas, unspecified TECHNIQUE: Low dose axial images, sagittal and coronal reformations were obtained from the thoracic inlet to the pubic symphysis following the IV administration of 75 mL of Omnipaque 350 . No oral contrast was administered.  Pancreatic mass protocol was implemented. COMPARISON: No relevant prior. FINDINGS: There is a heterogeneous exophytic left thyroid nodule measuring 2.0  cm.  Recommend thyroid ultrasound for further evaluation. Heart is normal in size.  Pulmonary arteries distribute normally.  Aorta maintains a normal caliber throughout the thorax and abdomen. No mediastinal lymphadenopathy.  Prominent right hilar lymph node noted. The trachea and proximal airways are patent.  The lungs are equally well expanded.  No large consolidative opacity.  There is minimal platelike atelectasis at the right lung base.  No pleural effusion.  No pneumothorax.  There is a small, 0.4 cm nodule within the lateral segment of the right middle lobe (series 3, image 70). The liver appears normal in size.  No focal hepatic lesions.  Portal vein is patent. There are some small calcified gallstones.  No biliary ductal dilatation.  Spleen, adrenal glands, and stomach are unremarkable. In the expected region of the pancreatic head, there is a poorly marginated, subtle area of relative hyper enhancement measuring 1.3 cm, possibly representing the patient's reported pancreatic head mass.  Remaining pancreatic tissue demonstrates extensive atrophy. Bowel shows no evidence of inflammation or obstruction. Kidneys are normal in size.  There is mild right pelviectasis.  Visualized portions of the ureters are unremarkable.  There is asymmetric right greater than left urinary bladder wall thickening.  Right adnexal hypodensity likely represents an ovarian cyst. Aorta maintains a normal caliber. Soft tissues are unremarkable.  Osseous structures demonstrate no significant abnormality.     In the expected region of the pancreatic head, there is a poorly marginated, inconspicuous area of lesion like enhancement measuring 1.3 cm, possibly representing the patient's reported pancreatic head mass.  Correlation with EUS is recommended. 0.4 cm nodule within the lateral segment of the right middle lobe.  In a low risk patient, no further follow-up is recommended.  In a high-risk patient (history of smoking and/or  malignancy) consider follow-up chest CT in 12 months. Heterogeneous, exophytic left thyroid nodule measuring 2.0 cm.  Thyroid ultrasound is recommended for further evaluation. Mild right pelviectasis and asymmetric right urinary bladder wall thickening. Clinical correlation recommended. Probable right ovarian cyst. This report was flagged in Epic as abnormal. Electronically signed by resident: Isacc Jackson Date:    09/26/2018 Time:    11:21 Electronically signed by: Rambo Childers MD Date:    09/26/2018 Time:    11:54      I have reviewed all available lab results and radiology reports.    Assessment/Plan:   (1) 57 y.o. female with diagnosis of pancreatic mass in head of pancrease seen on Ultrasonography on 8/5  - she is followed by Dr Vanessa with GI  - she saw Dr Corrigan/Dr Parikh per my request, had EUS, and she reports that he was unable to identify any pancreatic mass but she had some inflammation.   - she saw Dr Enriquez with Surg-Onc at [facility] on 9/26/2018 and he was awaiting reports from Dr Parikh  - she saw Dr Vanessa with GI on 12/4/2018  - she had last scans on 2/7/2019, and was supposed to get MRI but did not have it done  - she had pancreatic mass biopsy on 4/14/2019 with Dr Parikh which appears to have been nondiagnostic  - she saw Dr Parikh in Nov 2019 and Dr Vanessa just yesterday  - she previously never did the recommended MRI that had been scheduled  - repeat CT abdom was done on 12/5/2019 9/21/2020:  - she sees Dr Parikh again next year  - she had some labs done recently at [facility]    1/31/2022:  - she had repeat US of abdom earlier today with report as detailed above  - she sees Dr Corrigan again in near future and Dr parikh in Feb 2022  - she needs up to date labs    6/16/2022:  - she had ;labs done with Dr De Los Santos   - to see Dr Parikh in July 2022    6/15/2023:  - she is having repeat scopes with Dr Corrigan next week  - she started drinking again  - latest Hgb, plat and wbc are  adequate  - CEA 10.4     (2) Alcoholic liver disease/cirrhosis and prior alcoholic pancreatitis  - she has been sober for several months     (3) HTN and hypercholesterolemia     (4) prior stomach ulcers     (5) NCNC anemia, leucopenia and mild thrombocytopenia - most likely related to her liver disease       (6) Noncompliance issues which hinders my ability to provide her with care    (7) Left ovarian cyst     (8) Recent opthalmologic visit - need notes      6/16/2022:  - [facility] in [place] and is now on an oral medication for her eyes              Normochromic normocytic anemia    Thrombocytopenia    Leukopenia, unspecified type    Pancreatic mass          PLAN:  1.  F/u with GI - scope planned for next week with Dr Corrigan  2. Encouraged her to f/u with hepatology   3. F/u with PCP  4. Check up to date labs every 6 months (encouarged)  5. Order CXR and CT abdom/pelvis    RTC in 12 months         Fax note to  Mina Corrigan V. Joshi, Bernstein, Se[place] (new PCP)    Discussion:     COVID-19 Discussion:    I had long discussion with patient and any applicable family about the COVID-19 coronavirus epidemic and the recommended precautions with regard to cancer and/or hematology patients. I have re-iterated the CDC recommendations for adequate hand washing, use of hand -like products, and coughing into elbow, etc. In addition, especially for our patients who are on chemotherapy and/or our otherwise immunocompromised patients, I have recommended avoidance of crowds, including movie theaters, restaurants, churches, etc. I have recommended avoidance of any sick or symptomatic family members and/or friends. I have also recommended avoidance of any raw and unwashed food products, and general avoidance of food items that have not been prepared by themselves. The patient has been asked to call us immediately with any symptom developments, issues, questions or other general concerns.       I spent over 25  mins of time with the patient. Reviewed results of the recently ordered labs, tests and studies; made directives with regards to the results. Over half of this time was spent couseling and coordinating care.    I have explained all of the above in detail and the patient understands all of the current recommendation(s). I have answered all of their questions to the best of my ability and to their complete satisfaction.   The patient is to continue with the current management plan.              Electronically signed by Manjinder Christy MD

## 2023-06-15 ENCOUNTER — OFFICE VISIT (OUTPATIENT)
Dept: HEMATOLOGY/ONCOLOGY | Facility: CLINIC | Age: 58
End: 2023-06-15
Payer: MEDICARE

## 2023-06-15 VITALS
TEMPERATURE: 98 F | HEIGHT: 64 IN | BODY MASS INDEX: 21.68 KG/M2 | SYSTOLIC BLOOD PRESSURE: 114 MMHG | RESPIRATION RATE: 16 BRPM | DIASTOLIC BLOOD PRESSURE: 79 MMHG | HEART RATE: 64 BPM | WEIGHT: 127 LBS

## 2023-06-15 DIAGNOSIS — D72.819 LEUKOPENIA, UNSPECIFIED TYPE: ICD-10-CM

## 2023-06-15 DIAGNOSIS — D69.6 THROMBOCYTOPENIA: ICD-10-CM

## 2023-06-15 DIAGNOSIS — K86.89 PANCREATIC MASS: ICD-10-CM

## 2023-06-15 DIAGNOSIS — D64.9 NORMOCHROMIC NORMOCYTIC ANEMIA: Primary | ICD-10-CM

## 2023-06-15 PROCEDURE — 3074F PR MOST RECENT SYSTOLIC BLOOD PRESSURE < 130 MM HG: ICD-10-PCS | Mod: CPTII,S$GLB,, | Performed by: INTERNAL MEDICINE

## 2023-06-15 PROCEDURE — 3078F DIAST BP <80 MM HG: CPT | Mod: CPTII,S$GLB,, | Performed by: INTERNAL MEDICINE

## 2023-06-15 PROCEDURE — 99213 OFFICE O/P EST LOW 20 MIN: CPT | Mod: S$GLB,,, | Performed by: INTERNAL MEDICINE

## 2023-06-15 PROCEDURE — 3008F BODY MASS INDEX DOCD: CPT | Mod: CPTII,S$GLB,, | Performed by: INTERNAL MEDICINE

## 2023-06-15 PROCEDURE — 1160F PR REVIEW ALL MEDS BY PRESCRIBER/CLIN PHARMACIST DOCUMENTED: ICD-10-PCS | Mod: CPTII,S$GLB,, | Performed by: INTERNAL MEDICINE

## 2023-06-15 PROCEDURE — 3008F PR BODY MASS INDEX (BMI) DOCUMENTED: ICD-10-PCS | Mod: CPTII,S$GLB,, | Performed by: INTERNAL MEDICINE

## 2023-06-15 PROCEDURE — 4010F PR ACE/ARB THEARPY RXD/TAKEN: ICD-10-PCS | Mod: CPTII,S$GLB,, | Performed by: INTERNAL MEDICINE

## 2023-06-15 PROCEDURE — 3074F SYST BP LT 130 MM HG: CPT | Mod: CPTII,S$GLB,, | Performed by: INTERNAL MEDICINE

## 2023-06-15 PROCEDURE — 1160F RVW MEDS BY RX/DR IN RCRD: CPT | Mod: CPTII,S$GLB,, | Performed by: INTERNAL MEDICINE

## 2023-06-15 PROCEDURE — 99213 PR OFFICE/OUTPT VISIT, EST, LEVL III, 20-29 MIN: ICD-10-PCS | Mod: S$GLB,,, | Performed by: INTERNAL MEDICINE

## 2023-06-15 PROCEDURE — 4010F ACE/ARB THERAPY RXD/TAKEN: CPT | Mod: CPTII,S$GLB,, | Performed by: INTERNAL MEDICINE

## 2023-06-15 PROCEDURE — 1159F MED LIST DOCD IN RCRD: CPT | Mod: CPTII,S$GLB,, | Performed by: INTERNAL MEDICINE

## 2023-06-15 PROCEDURE — 3078F PR MOST RECENT DIASTOLIC BLOOD PRESSURE < 80 MM HG: ICD-10-PCS | Mod: CPTII,S$GLB,, | Performed by: INTERNAL MEDICINE

## 2023-06-15 PROCEDURE — 1159F PR MEDICATION LIST DOCUMENTED IN MEDICAL RECORD: ICD-10-PCS | Mod: CPTII,S$GLB,, | Performed by: INTERNAL MEDICINE

## 2023-06-15 RX ORDER — CYCLOBENZAPRINE HCL 10 MG
10 TABLET ORAL 3 TIMES DAILY
COMMUNITY
Start: 2023-05-27

## 2023-06-15 RX ORDER — ERGOCALCIFEROL 1.25 MG/1
CAPSULE ORAL
COMMUNITY

## 2023-06-16 ENCOUNTER — TELEPHONE (OUTPATIENT)
Dept: HEMATOLOGY/ONCOLOGY | Facility: CLINIC | Age: 58
End: 2023-06-16

## 2023-06-16 NOTE — TELEPHONE ENCOUNTER
----- Message from Manjinder Christy MD sent at 6/16/2023 11:08 AM CDT -----  Make sure my note and her latest labs get over to Dr Corrigan and Dr parikh      ----- Message -----  From: Yane Watt RN  Sent: 6/16/2023  10:27 AM CDT  To: Manjinder Christy MD    Last  was from 2 years ago, which was 37.6  ----- Message -----  From: Manjinder Christy MD  Sent: 6/16/2023   7:37 AM CDT  To: Yane Watt RN, [facility] Staff    What was her level last time ?   ----- Message -----  From: Libia Ramon  Sent: 6/15/2023   4:17 PM CDT  To: Manjinder Christy MD    LAB

## 2023-06-16 NOTE — TELEPHONE ENCOUNTER
Dr. Christy's most recent office note and patient's most recent labs faxed to Dr. Corrigan and routed to Dr. Cummings per Dr. Christy's orders to do so.

## 2023-08-08 ENCOUNTER — HOSPITAL ENCOUNTER (EMERGENCY)
Facility: HOSPITAL | Age: 58
Discharge: HOME | End: 2023-08-08
Attending: EMERGENCY MEDICINE
Payer: MEDICARE

## 2023-08-08 VITALS
TEMPERATURE: 99 F | HEART RATE: 88 BPM | WEIGHT: 118 LBS | HEIGHT: 64 IN | DIASTOLIC BLOOD PRESSURE: 93 MMHG | SYSTOLIC BLOOD PRESSURE: 154 MMHG | RESPIRATION RATE: 19 BRPM | BODY MASS INDEX: 20.14 KG/M2 | OXYGEN SATURATION: 96 %

## 2023-08-08 DIAGNOSIS — R06.02 SHORTNESS OF BREATH: ICD-10-CM

## 2023-08-08 DIAGNOSIS — J98.01 BRONCHOSPASM: ICD-10-CM

## 2023-08-08 DIAGNOSIS — J40 BRONCHITIS: ICD-10-CM

## 2023-08-08 DIAGNOSIS — R05.9 COUGH, UNSPECIFIED TYPE: Primary | ICD-10-CM

## 2023-08-08 LAB
ALBUMIN SERPL BCP-MCNC: 4.2 G/DL (ref 3.5–5.2)
ALP SERPL-CCNC: 70 U/L (ref 55–135)
ALT SERPL W/O P-5'-P-CCNC: 29 U/L (ref 10–44)
ANION GAP SERPL CALC-SCNC: 7 MMOL/L (ref 8–16)
AST SERPL-CCNC: 54 U/L (ref 10–40)
BASOPHILS # BLD AUTO: 0.04 K/UL (ref 0–0.2)
BASOPHILS NFR BLD: 1.1 % (ref 0–1.9)
BILIRUB SERPL-MCNC: 0.8 MG/DL (ref 0.1–1)
BNP SERPL-MCNC: 37 PG/ML (ref 0–99)
BUN SERPL-MCNC: <5 MG/DL (ref 6–20)
CALCIUM SERPL-MCNC: 9.2 MG/DL (ref 8.7–10.5)
CHLORIDE SERPL-SCNC: 104 MMOL/L (ref 95–110)
CO2 SERPL-SCNC: 24 MMOL/L (ref 23–29)
CREAT SERPL-MCNC: 0.8 MG/DL (ref 0.5–1.4)
DIFFERENTIAL METHOD: ABNORMAL
EOSINOPHIL # BLD AUTO: 0.1 K/UL (ref 0–0.5)
EOSINOPHIL NFR BLD: 3.3 % (ref 0–8)
ERYTHROCYTE [DISTWIDTH] IN BLOOD BY AUTOMATED COUNT: 13.4 % (ref 11.5–14.5)
EST. GFR  (NO RACE VARIABLE): >60 ML/MIN/1.73 M^2
GLUCOSE SERPL-MCNC: 173 MG/DL (ref 70–110)
HCT VFR BLD AUTO: 40.1 % (ref 37–48.5)
HGB BLD-MCNC: 14.7 G/DL (ref 12–16)
IMM GRANULOCYTES # BLD AUTO: 0.03 K/UL (ref 0–0.04)
IMM GRANULOCYTES NFR BLD AUTO: 0.8 % (ref 0–0.5)
INFLUENZA A, MOLECULAR: NEGATIVE
INFLUENZA B, MOLECULAR: NEGATIVE
LYMPHOCYTES # BLD AUTO: 0.9 K/UL (ref 1–4.8)
LYMPHOCYTES NFR BLD: 23.6 % (ref 18–48)
MAGNESIUM SERPL-MCNC: 1.7 MG/DL (ref 1.6–2.6)
MCH RBC QN AUTO: 33.9 PG (ref 27–31)
MCHC RBC AUTO-ENTMCNC: 36.7 G/DL (ref 32–36)
MCV RBC AUTO: 93 FL (ref 82–98)
MONOCYTES # BLD AUTO: 0.9 K/UL (ref 0.3–1)
MONOCYTES NFR BLD: 25.8 % (ref 4–15)
NEUTROPHILS # BLD AUTO: 1.7 K/UL (ref 1.8–7.7)
NEUTROPHILS NFR BLD: 45.4 % (ref 38–73)
NRBC BLD-RTO: 0 /100 WBC
PLATELET # BLD AUTO: 130 K/UL (ref 150–450)
PMV BLD AUTO: 8.8 FL (ref 9.2–12.9)
POTASSIUM SERPL-SCNC: 4.2 MMOL/L (ref 3.5–5.1)
PROT SERPL-MCNC: 7.6 G/DL (ref 6–8.4)
RBC # BLD AUTO: 4.33 M/UL (ref 4–5.4)
SARS-COV-2 RDRP RESP QL NAA+PROBE: NEGATIVE
SODIUM SERPL-SCNC: 135 MMOL/L (ref 136–145)
SPECIMEN SOURCE: NORMAL
TROPONIN I SERPL HS-MCNC: 6.8 PG/ML (ref 0–14.9)
WBC # BLD AUTO: 3.64 K/UL (ref 3.9–12.7)

## 2023-08-08 PROCEDURE — 83880 ASSAY OF NATRIURETIC PEPTIDE: CPT | Performed by: NURSE PRACTITIONER

## 2023-08-08 PROCEDURE — 94640 AIRWAY INHALATION TREATMENT: CPT

## 2023-08-08 PROCEDURE — U0002 COVID-19 LAB TEST NON-CDC: HCPCS | Performed by: NURSE PRACTITIONER

## 2023-08-08 PROCEDURE — 80053 COMPREHEN METABOLIC PANEL: CPT | Performed by: NURSE PRACTITIONER

## 2023-08-08 PROCEDURE — 63600175 PHARM REV CODE 636 W HCPCS: Mod: UD | Performed by: EMERGENCY MEDICINE

## 2023-08-08 PROCEDURE — 85025 COMPLETE CBC W/AUTO DIFF WBC: CPT | Performed by: NURSE PRACTITIONER

## 2023-08-08 PROCEDURE — 94760 N-INVAS EAR/PLS OXIMETRY 1: CPT

## 2023-08-08 PROCEDURE — 96374 THER/PROPH/DIAG INJ IV PUSH: CPT

## 2023-08-08 PROCEDURE — 93010 EKG 12-LEAD: ICD-10-PCS | Mod: ,,, | Performed by: INTERNAL MEDICINE

## 2023-08-08 PROCEDURE — 84484 ASSAY OF TROPONIN QUANT: CPT | Performed by: NURSE PRACTITIONER

## 2023-08-08 PROCEDURE — 83735 ASSAY OF MAGNESIUM: CPT | Performed by: NURSE PRACTITIONER

## 2023-08-08 PROCEDURE — 25000242 PHARM REV CODE 250 ALT 637 W/ HCPCS: Performed by: EMERGENCY MEDICINE

## 2023-08-08 PROCEDURE — 93010 ELECTROCARDIOGRAM REPORT: CPT | Mod: ,,, | Performed by: INTERNAL MEDICINE

## 2023-08-08 PROCEDURE — 93005 ELECTROCARDIOGRAM TRACING: CPT | Performed by: INTERNAL MEDICINE

## 2023-08-08 PROCEDURE — 87502 INFLUENZA DNA AMP PROBE: CPT | Performed by: EMERGENCY MEDICINE

## 2023-08-08 PROCEDURE — 99285 EMERGENCY DEPT VISIT HI MDM: CPT | Mod: 25

## 2023-08-08 RX ORDER — PREDNISONE 20 MG/1
40 TABLET ORAL DAILY
Qty: 10 TABLET | Refills: 0 | Status: SHIPPED | OUTPATIENT
Start: 2023-08-08 | End: 2023-08-13

## 2023-08-08 RX ORDER — IPRATROPIUM BROMIDE AND ALBUTEROL SULFATE 2.5; .5 MG/3ML; MG/3ML
9 SOLUTION RESPIRATORY (INHALATION)
Status: COMPLETED | OUTPATIENT
Start: 2023-08-08 | End: 2023-08-08

## 2023-08-08 RX ORDER — DOXYCYCLINE 100 MG/1
100 CAPSULE ORAL EVERY 12 HOURS
Qty: 20 CAPSULE | Refills: 0 | Status: SHIPPED | OUTPATIENT
Start: 2023-08-08 | End: 2023-08-18

## 2023-08-08 RX ORDER — METHYLPREDNISOLONE SOD SUCC 125 MG
125 VIAL (EA) INJECTION
Status: COMPLETED | OUTPATIENT
Start: 2023-08-08 | End: 2023-08-08

## 2023-08-08 RX ORDER — ALBUTEROL SULFATE 90 UG/1
1-2 AEROSOL, METERED RESPIRATORY (INHALATION) EVERY 4 HOURS PRN
Qty: 18 G | Refills: 0 | Status: SHIPPED | OUTPATIENT
Start: 2023-08-08 | End: 2024-08-07

## 2023-08-08 RX ADMIN — IPRATROPIUM BROMIDE AND ALBUTEROL SULFATE 9 ML: 2.5; .5 SOLUTION RESPIRATORY (INHALATION) at 12:08

## 2023-08-08 RX ADMIN — METHYLPREDNISOLONE SODIUM SUCCINATE 125 MG: 125 INJECTION, POWDER, FOR SOLUTION INTRAMUSCULAR; INTRAVENOUS at 01:08

## 2023-08-08 NOTE — ED PROVIDER NOTES
Encounter Date: 8/8/2023       History     Chief Complaint   Patient presents with    Shortness of Breath     X 3-4 DAYS    Cough     57-year-old female who has a history of alcoholic liver disease, cirrhosis, hypertension, hyperlipidemia, pancreatic cancer, thrombocytopenia, presents with a 3 day history of cough that is only productive of clear secretions.  Patient denies any history of any known fever no associated chest pain.  She has had wheezing.  She does not smoke tobacco but does smoke marijuana.  No history of CHF.      Review of patient's allergies indicates:   Allergen Reactions    Opioids - morphine analogues Itching     Past Medical History:   Diagnosis Date    Alcohol induced acute pancreatitis     Alcoholic cirrhosis of liver without ascites     Alcoholic liver disease     Gastric ulcer, unspecified as acute or chronic, without hemorrhage or perforation     Hyperlipidemia     Hypertension     Left-sided low back pain with left-sided sciatica     Leucopenia 9/12/2018    Normochromic normocytic anemia 9/12/2018    Pancreatic cancer 9/12/2018    Pancreatic mass 9/12/2018    Pancreatitis     Stomach ulcer     Thrombocytopenia 9/12/2018    Unspecified cirrhosis of liver      Past Surgical History:   Procedure Laterality Date    COLONOSCOPY N/A 8/19/2022    Procedure: COLONOSCOPY;  Surgeon: Simon Corrigan III, MD;  Location: [facility];  Service: Endoscopy;  Laterality: N/A;    ENDOSCOPIC ULTRASOUND OF UPPER GASTROINTESTINAL TRACT N/A 8/19/2022    Procedure: ULTRASOUND, UPPER GI TRACT, ENDOSCOPIC;  Surgeon: Simon Corrigan III, MD;  Location: [facility];  Service: Endoscopy;  Laterality: N/A;    ESOPHAGOGASTRODUODENOSCOPY      PARTIAL HYSTERECTOMY       Family History   Problem Relation Age of Onset    Hypertension Mother     Kidney failure Mother     Hypertension Father     Heart attack Father      Social History     Tobacco Use    Smoking status: Never    Smokeless tobacco: Never   Substance Use  Topics    Alcohol use: Yes     Alcohol/week: 2.0 standard drinks of alcohol     Types: 2 Cans of beer per week     Comment: socially    Drug use: Yes     Frequency: 7.0 times per week     Types: Marijuana     Comment: daily     Review of Systems   Constitutional:  Negative for chills, diaphoresis and fever.   HENT:  Negative for sore throat and trouble swallowing.    Respiratory:  Positive for cough and wheezing. Negative for shortness of breath.    Cardiovascular:  Negative for chest pain, palpitations and leg swelling.   Gastrointestinal:  Negative for abdominal pain, diarrhea, nausea and vomiting.   Genitourinary:  Negative for difficulty urinating, dysuria and hematuria.   Musculoskeletal:  Negative for back pain and myalgias.   Skin: Negative.  Negative for rash.   Neurological:  Negative for weakness and headaches.   Hematological:  Does not bruise/bleed easily.   All other systems reviewed and are negative.      Physical Exam     Initial Vitals [08/08/23 0902]   BP Pulse Resp Temp SpO2   (!) 168/102 85 (!) 24 100 °F (37.8 °C) 96 %      MAP       --         Physical Exam    Vitals reviewed.  Constitutional: She appears well-developed and well-nourished. She is not diaphoretic. No distress.   Persistent cough   HENT:   Head: Normocephalic and atraumatic.   Nose: Nose normal.   Mouth/Throat: Oropharynx is clear and moist. No oropharyngeal exudate.   Eyes: Conjunctivae are normal. Pupils are equal, round, and reactive to light.   Neck: Neck supple. No JVD present.   Normal range of motion.  Cardiovascular:  Normal rate, regular rhythm, normal heart sounds and intact distal pulses.     Exam reveals no gallop and no friction rub.       No murmur heard.  Pulmonary/Chest: No respiratory distress. She has wheezes. She has no rhonchi. She has no rales. She exhibits no tenderness.   Abdominal: Abdomen is soft. Bowel sounds are normal. She exhibits no distension. There is no abdominal tenderness. There is no rebound and  no guarding.   Musculoskeletal:         General: No tenderness or edema. Normal range of motion.      Cervical back: Normal range of motion and neck supple.     Lymphadenopathy:     She has no cervical adenopathy.   Neurological: She is alert and oriented to person, place, and time. She has normal strength. GCS score is 15. GCS eye subscore is 4. GCS verbal subscore is 5. GCS motor subscore is 6.   Skin: Skin is warm and dry. Capillary refill takes less than 2 seconds. No rash noted. No erythema. No pallor.   Psychiatric: She has a normal mood and affect. Her behavior is normal. Judgment and thought content normal.         ED Course   Procedures  Labs Reviewed   CBC W/ AUTO DIFFERENTIAL - Abnormal; Notable for the following components:       Result Value    WBC 3.64 (*)     MCH 33.9 (*)     MCHC 36.7 (*)     Platelets 130 (*)     MPV 8.8 (*)     Immature Granulocytes 0.8 (*)     Gran # (ANC) 1.7 (*)     Lymph # 0.9 (*)     Mono % 25.8 (*)     All other components within normal limits   COMPREHENSIVE METABOLIC PANEL - Abnormal; Notable for the following components:    Sodium 135 (*)     Glucose 173 (*)     BUN <5 (*)     AST 54 (*)     Anion Gap 7 (*)     All other components within normal limits   SARS-COV-2 RNA AMPLIFICATION, QUAL   MAGNESIUM   TROPONIN I HIGH SENSITIVITY   B-TYPE NATRIURETIC PEPTIDE   INFLUENZA A AND B ANTIGEN    Narrative:     Specimen Source->Nasopharyngeal Swab        ECG Results              EKG 12-lead (In process)  Result time 08/08/23 11:33:32      In process by Interface, Lab In [facility] (08/08/23 11:33:32)                   Narrative:    Test Reason : R06.02,    Vent. Rate : 081 BPM     Atrial Rate : 081 BPM     P-R Int : 146 ms          QRS Dur : 070 ms      QT Int : 356 ms       P-R-T Axes : 074 063 046 degrees     QTc Int : 413 ms    Normal sinus rhythm  Anterior infarct (cited on or before 12-FEB-2021)  Abnormal ECG  When compared with ECG of 08-AUG-2022 14:55,  Vent. rate has  increased BY  27 BPM  Questionable change in initial forces of Anterior leads    Referred By: [email or web address]   SELF           Confirmed By:                                   Imaging Results              X-Ray Chest 1 View (Final result)  Result time 08/08/23 10:03:46      Final result by Oseas Grissom MD (08/08/23 10:03:46)                   Narrative:    Chest single view    Clinical data:Shortness of breath. Comparison to December 2022.    FINDINGS: AP view of the chest demonstrates no cardiac, pulmonary, or osseous abnormalities.    IMPRESSION:  1. Normal chest single view.    Electronically signed by:  Oseas Grissom MD  8/8/2023 10:03 AM CDT Workstation: [id number]                                     Medications   methylPREDNISolone sodium succinate injection 125 mg (has no administration in time range)   albuterol-ipratropium 2.5 mg-0.5 mg/3 mL nebulizer solution 9 mL (9 mLs Nebulization Given 8/8/23 7648)                Attending Attestation:             Attending ED Notes:   This 57-year-old female who had presented with complaints of cough 3 days duration which is for the most part nonproductive but who had had diffuse expiratory wheezes in all fields and no prior history of asthma COPD, was given 3 DuoNeb treatments and chest was re auscultated and wheezing had essentially resolved only with some residual rhonchi in the left anterior chest.  Patient will be initially given Solu-Medrol 125 mg IV.  She will be discharged and continued on steroids, antibiotics and metered-dose inhaler bronchodilator.  She is to follow with her primary care physician in a week.                   Clinical Impression:   Final diagnoses:  [R06.02] Shortness of breath  [R05.9] Cough, unspecified type (Primary)  [J40] Bronchitis  [J98.01] Bronchospasm        ED Disposition Condition    Discharge Stable          ED Prescriptions       Medication Sig Dispense Start Date End Date Auth. Provider    albuterol (PROVENTIL/VENTOLIN  HFA) 90 mcg/actuation inhaler Inhale 1-2 puffs into the lungs every 4 (four) hours as needed for Wheezing. Rescue 18 g 8/8/2023 8/7/2024 Osbaldo Narvaez Jr., MD    doxycycline (VIBRAMYCIN) 100 MG Cap Take 1 capsule (100 mg total) by mouth every 12 (twelve) hours. for 10 days 20 capsule 8/8/2023 8/18/2023 Osbaldo Narvaez Jr., MD    predniSONE (DELTASONE) 20 MG tablet Take 2 tablets (40 mg total) by mouth once daily. for 5 days 10 tablet 8/8/2023 8/13/2023 Osbaldo Narvaez Jr., MD          Follow-up Information       Follow up With Specialties Details Why Contact Info    Sari De Los Santos, NP Endocrinology Schedule an appointment as soon as possible for a visit in 1 week For re-evaluation. [address] [place] [address] [facility] [address]  [phone]               Osbaldo Narvaez Jr., MD  08/08/23 0574

## 2023-08-08 NOTE — DISCHARGE INSTRUCTIONS
Encourage oral fluids.  Stop smoking marijuana.  Take antibiotics, inhaler and steroids as directed.  Return as needed.  No work for 48-72 hours

## 2023-08-11 ENCOUNTER — HOSPITAL ENCOUNTER (EMERGENCY)
Facility: HOSPITAL | Age: 58
Discharge: HOME | End: 2023-08-11
Attending: EMERGENCY MEDICINE
Payer: MEDICARE

## 2023-08-11 VITALS
SYSTOLIC BLOOD PRESSURE: 148 MMHG | OXYGEN SATURATION: 99 % | HEIGHT: 64 IN | TEMPERATURE: 99 F | RESPIRATION RATE: 18 BRPM | WEIGHT: 118 LBS | DIASTOLIC BLOOD PRESSURE: 89 MMHG | HEART RATE: 62 BPM | BODY MASS INDEX: 20.14 KG/M2

## 2023-08-11 DIAGNOSIS — R06.02 SHORTNESS OF BREATH: ICD-10-CM

## 2023-08-11 DIAGNOSIS — J44.1 COPD EXACERBATION: Primary | ICD-10-CM

## 2023-08-11 LAB
ALBUMIN SERPL BCP-MCNC: 4.1 G/DL (ref 3.5–5.2)
ALP SERPL-CCNC: 58 U/L (ref 55–135)
ALT SERPL W/O P-5'-P-CCNC: 27 U/L (ref 10–44)
ANION GAP SERPL CALC-SCNC: 10 MMOL/L (ref 8–16)
AST SERPL-CCNC: 34 U/L (ref 10–40)
BASOPHILS NFR BLD: 0 % (ref 0–1.9)
BILIRUB SERPL-MCNC: 0.7 MG/DL (ref 0.1–1)
BNP SERPL-MCNC: 30 PG/ML (ref 0–99)
BUN SERPL-MCNC: 7 MG/DL (ref 6–20)
CALCIUM SERPL-MCNC: 9.2 MG/DL (ref 8.7–10.5)
CHLORIDE SERPL-SCNC: 105 MMOL/L (ref 95–110)
CO2 SERPL-SCNC: 23 MMOL/L (ref 23–29)
CREAT SERPL-MCNC: 0.7 MG/DL (ref 0.5–1.4)
DIFFERENTIAL METHOD: ABNORMAL
EOSINOPHIL NFR BLD: 0 % (ref 0–8)
ERYTHROCYTE [DISTWIDTH] IN BLOOD BY AUTOMATED COUNT: 12.9 % (ref 11.5–14.5)
EST. GFR  (NO RACE VARIABLE): >60 ML/MIN/1.73 M^2
GLUCOSE SERPL-MCNC: 111 MG/DL (ref 70–110)
HCT VFR BLD AUTO: 40.1 % (ref 37–48.5)
HGB BLD-MCNC: 14.8 G/DL (ref 12–16)
IMM GRANULOCYTES # BLD AUTO: ABNORMAL K/UL (ref 0–0.04)
IMM GRANULOCYTES NFR BLD AUTO: ABNORMAL % (ref 0–0.5)
LYMPHOCYTES NFR BLD: 52 % (ref 18–48)
MCH RBC QN AUTO: 34 PG (ref 27–31)
MCHC RBC AUTO-ENTMCNC: 36.9 G/DL (ref 32–36)
MCV RBC AUTO: 92 FL (ref 82–98)
MONOCYTES NFR BLD: 11 % (ref 4–15)
NEUTROPHILS NFR BLD: 37 % (ref 38–73)
NRBC BLD-RTO: 0 /100 WBC
PLATELET # BLD AUTO: 168 K/UL (ref 150–450)
PLATELET BLD QL SMEAR: ABNORMAL
PMV BLD AUTO: 9.8 FL (ref 9.2–12.9)
POTASSIUM SERPL-SCNC: 3.3 MMOL/L (ref 3.5–5.1)
PROT SERPL-MCNC: 7.6 G/DL (ref 6–8.4)
RBC # BLD AUTO: 4.35 M/UL (ref 4–5.4)
SARS-COV-2 RDRP RESP QL NAA+PROBE: NEGATIVE
SODIUM SERPL-SCNC: 138 MMOL/L (ref 136–145)
TROPONIN I SERPL HS-MCNC: 7.5 PG/ML (ref 0–14.9)
WBC # BLD AUTO: 9.17 K/UL (ref 3.9–12.7)

## 2023-08-11 PROCEDURE — 84484 ASSAY OF TROPONIN QUANT: CPT | Performed by: EMERGENCY MEDICINE

## 2023-08-11 PROCEDURE — 99900031 HC PATIENT EDUCATION (STAT)

## 2023-08-11 PROCEDURE — 99284 EMERGENCY DEPT VISIT MOD MDM: CPT | Mod: 25

## 2023-08-11 PROCEDURE — 93010 ELECTROCARDIOGRAM REPORT: CPT | Mod: ,,, | Performed by: INTERNAL MEDICINE

## 2023-08-11 PROCEDURE — 93010 EKG 12-LEAD: ICD-10-PCS | Mod: ,,, | Performed by: INTERNAL MEDICINE

## 2023-08-11 PROCEDURE — 94644 CONT INHLJ TX 1ST HOUR: CPT

## 2023-08-11 PROCEDURE — 83880 ASSAY OF NATRIURETIC PEPTIDE: CPT | Performed by: EMERGENCY MEDICINE

## 2023-08-11 PROCEDURE — 25000242 PHARM REV CODE 250 ALT 637 W/ HCPCS: Performed by: EMERGENCY MEDICINE

## 2023-08-11 PROCEDURE — 80053 COMPREHEN METABOLIC PANEL: CPT | Performed by: EMERGENCY MEDICINE

## 2023-08-11 PROCEDURE — 85027 COMPLETE CBC AUTOMATED: CPT | Performed by: EMERGENCY MEDICINE

## 2023-08-11 PROCEDURE — 85007 BL SMEAR W/DIFF WBC COUNT: CPT | Performed by: EMERGENCY MEDICINE

## 2023-08-11 PROCEDURE — 63600175 PHARM REV CODE 636 W HCPCS: Mod: UD | Performed by: EMERGENCY MEDICINE

## 2023-08-11 PROCEDURE — U0002 COVID-19 LAB TEST NON-CDC: HCPCS | Performed by: EMERGENCY MEDICINE

## 2023-08-11 PROCEDURE — 93005 ELECTROCARDIOGRAM TRACING: CPT | Performed by: INTERNAL MEDICINE

## 2023-08-11 PROCEDURE — 96365 THER/PROPH/DIAG IV INF INIT: CPT

## 2023-08-11 RX ORDER — LEVALBUTEROL INHALATION SOLUTION 1.25 MG/3ML
3.75 SOLUTION RESPIRATORY (INHALATION)
Status: COMPLETED | OUTPATIENT
Start: 2023-08-11 | End: 2023-08-11

## 2023-08-11 RX ORDER — MAGNESIUM SULFATE HEPTAHYDRATE 40 MG/ML
2 INJECTION, SOLUTION INTRAVENOUS ONCE
Status: COMPLETED | OUTPATIENT
Start: 2023-08-11 | End: 2023-08-11

## 2023-08-11 RX ORDER — IPRATROPIUM BROMIDE 0.5 MG/2.5ML
1 SOLUTION RESPIRATORY (INHALATION)
Status: COMPLETED | OUTPATIENT
Start: 2023-08-11 | End: 2023-08-11

## 2023-08-11 RX ORDER — IPRATROPIUM BROMIDE AND ALBUTEROL SULFATE 2.5; .5 MG/3ML; MG/3ML
3 SOLUTION RESPIRATORY (INHALATION) EVERY 6 HOURS PRN
Qty: 75 ML | Refills: 0 | Status: SHIPPED | OUTPATIENT
Start: 2023-08-11 | End: 2024-08-10

## 2023-08-11 RX ORDER — PREDNISONE 20 MG/1
40 TABLET ORAL DAILY
Qty: 6 TABLET | Refills: 0 | Status: SHIPPED | OUTPATIENT
Start: 2023-08-11 | End: 2023-08-14

## 2023-08-11 RX ADMIN — LEVALBUTEROL HYDROCHLORIDE 3.75 MG: 1.25 SOLUTION RESPIRATORY (INHALATION) at 10:08

## 2023-08-11 RX ADMIN — IPRATROPIUM BROMIDE 1 MG: 0.5 SOLUTION RESPIRATORY (INHALATION) at 10:08

## 2023-08-11 RX ADMIN — MAGNESIUM SULFATE HEPTAHYDRATE 2 G: 40 INJECTION, SOLUTION INTRAVENOUS at 11:08

## 2023-08-11 NOTE — ED PROVIDER NOTES
Encounter Date: 8/11/2023       History     Chief Complaint   Patient presents with    Shortness of Breath     Since Tuesday, recent dx of bronchitis. SOB worsening.      Patient presents emergency department with reported shortness of breath states he was seen in the emergency department on Tuesday diagnosed with bronchitis states was placed on antibiotics but despite this her symptoms are worsening she denies any fever cough productive of a whitish clear sputum she denies any sore throat she does report some congestion no chest pain no abdominal pain or nausea vomiting        Review of patient's allergies indicates:   Allergen Reactions    Opioids - morphine analogues Itching     Past Medical History:   Diagnosis Date    Alcohol induced acute pancreatitis     Alcoholic cirrhosis of liver without ascites     Alcoholic liver disease     Gastric ulcer, unspecified as acute or chronic, without hemorrhage or perforation     Hyperlipidemia     Hypertension     Left-sided low back pain with left-sided sciatica     Leucopenia 9/12/2018    Normochromic normocytic anemia 9/12/2018    Pancreatic cancer 9/12/2018    Pancreatic mass 9/12/2018    Pancreatitis     Stomach ulcer     Thrombocytopenia 9/12/2018    Unspecified cirrhosis of liver      Past Surgical History:   Procedure Laterality Date    COLONOSCOPY N/A 8/19/2022    Procedure: COLONOSCOPY;  Surgeon: Simon Corrigan III, MD;  Location: [facility];  Service: Endoscopy;  Laterality: N/A;    ENDOSCOPIC ULTRASOUND OF UPPER GASTROINTESTINAL TRACT N/A 8/19/2022    Procedure: ULTRASOUND, UPPER GI TRACT, ENDOSCOPIC;  Surgeon: Simon Corrigan III, MD;  Location: [facility];  Service: Endoscopy;  Laterality: N/A;    ESOPHAGOGASTRODUODENOSCOPY      PARTIAL HYSTERECTOMY       Family History   Problem Relation Age of Onset    Hypertension Mother     Kidney failure Mother     Hypertension Father     Heart attack Father      Social History     Tobacco Use    Smoking status:  Never    Smokeless tobacco: Never   Substance Use Topics    Alcohol use: Yes     Alcohol/week: 2.0 standard drinks of alcohol     Types: 2 Cans of beer per week     Comment: socially    Drug use: Yes     Frequency: 7.0 times per week     Types: Marijuana     Comment: daily     Review of Systems   Constitutional:  Negative for chills and fever.   HENT:  Positive for congestion. Negative for sore throat.    Respiratory:  Positive for cough, shortness of breath and wheezing.    Gastrointestinal:  Negative for nausea and vomiting.   All other systems reviewed and are negative.      Physical Exam     Initial Vitals [08/11/23 0948]   BP Pulse Resp Temp SpO2   (!) 195/111 81 18 98.5 °F (36.9 °C) 98 %      MAP       --         Physical Exam    Constitutional: She appears well-developed and well-nourished. No distress.   HENT:   Head: Normocephalic and atraumatic.   Right Ear: External ear normal.   Left Ear: External ear normal.   Mouth/Throat: Oropharynx is clear and moist.   Eyes: Conjunctivae and EOM are normal. Pupils are equal, round, and reactive to light.   Neck: Neck supple.   Normal range of motion.  Cardiovascular:  Normal rate, regular rhythm, normal heart sounds and intact distal pulses.           Pulmonary/Chest: She is in respiratory distress. She has wheezes. She has rhonchi.   Abdominal: Abdomen is soft. Bowel sounds are normal. There is no abdominal tenderness.   Musculoskeletal:         General: No edema. Normal range of motion.      Cervical back: Normal range of motion and neck supple.     Neurological: She is alert and oriented to person, place, and time. GCS score is 15. GCS eye subscore is 4. GCS verbal subscore is 5. GCS motor subscore is 6.   Skin: Skin is warm and dry. Capillary refill takes less than 2 seconds. No rash noted.   Psychiatric: She has a normal mood and affect. Her behavior is normal.         ED Course   Procedures  Labs Reviewed   CBC W/ AUTO DIFFERENTIAL - Abnormal; Notable for the  following components:       Result Value    MCH 34.0 (*)     MCHC 36.9 (*)     Gran % 37.0 (*)     Lymph % 52.0 (*)     All other components within normal limits   COMPREHENSIVE METABOLIC PANEL - Abnormal; Notable for the following components:    Potassium 3.3 (*)     Glucose 111 (*)     All other components within normal limits   TROPONIN I HIGH SENSITIVITY   B-TYPE NATRIURETIC PEPTIDE   SARS-COV-2 RNA AMPLIFICATION, QUAL        ECG Results              EKG 12-lead (In process)  Result time 08/11/23 10:28:22      In process by Interface, Lab In [facility] (08/11/23 10:28:22)                   Narrative:    Test Reason : R06.02,    Vent. Rate : 070 BPM     Atrial Rate : 070 BPM     P-R Int : 126 ms          QRS Dur : 078 ms      QT Int : 374 ms       P-R-T Axes : 082 078 073 degrees     QTc Int : 403 ms    Normal sinus rhythm  Moderate voltage criteria for LVH, may be normal variant  Cannot rule out Septal infarct (cited on or before 12-FEB-2021)  ST and T wave abnormality, consider anterior ischemia  Abnormal ECG  When compared with ECG of 08-AUG-2023 09:13,  No significant change was found    Referred By: [email or web address]   SELF           Confirmed By:                                   Imaging Results              X-Ray Chest AP Portable (Final result)  Result time 08/11/23 10:25:13      Final result by Pamela Burroughs MD (08/11/23 10:25:13)                   Narrative:    XR CHEST 1 VIEW    CLINICAL HISTORY:  57 years Female CHF    COMPARISON: 8/8/2023    FINDINGS: Cardiomediastinal silhouette is within normal limits. Lungs are normally expanded with no airspace consolidation. No pleural effusion or pneumothorax. No acute osseous abnormality.    IMPRESSION: No acute pulmonary process.    Electronically signed by:  Pamela Burroughs MD  8/11/2023 10:25 AM CDT Workstation: [id number]                                     Medications   levalbuterol nebulizer solution 3.75 mg (3.75 mg Nebulization Given 8/11/23 5884)    ipratropium 0.02 % nebulizer solution 1 mg (1 mg Nebulization Given 8/11/23 1049)   magnesium sulfate 2g in water 50mL IVPB (premix) (2 g Intravenous New Bag 8/11/23 1142)     Medical Decision Making:   Clinical Tests:   Lab Tests: Ordered and Reviewed  Radiological Study: Ordered and Reviewed  ED Management:  Laboratory evaluation reviewed patient given multiple rounds of Atrovent IV steroids IV magnesium with significant improvement in patient's symptoms she is at baseline she is asked for home nebulizer will provide her with a prescription for this will also provide a prescription for DuoNeb and will continue her steroids for an additional 3 days                          Clinical Impression:   Final diagnoses:  [R06.02] Shortness of breath  [J44.1] COPD exacerbation (Primary)        ED Disposition Condition    Discharge Stable          ED Prescriptions       Medication Sig Dispense Start Date End Date Auth. Provider    albuterol-ipratropium (DUO-NEB) 2.5 mg-0.5 mg/3 mL nebulizer solution Take 3 mLs by nebulization every 6 (six) hours as needed for Wheezing. Rescue 75 mL 8/11/2023 8/10/2024 Jaiden Campbell MD    predniSONE (DELTASONE) 20 MG tablet Take 2 tablets (40 mg total) by mouth once daily. for 3 days 6 tablet 8/11/2023 8/14/2023 Jaiden Campbell MD          Follow-up Information       Follow up With Specialties Details Why Contact Info Additional Information    [facility] [address] [facility] Medicine Family Medicine In 1 day for re-examination of your symptoms [address] [facility] [address]  [facility] [id number] [phone] [address]             Jaiden Campbell MD  08/11/23 4059

## 2023-09-11 ENCOUNTER — HOSPITAL ENCOUNTER (EMERGENCY)
Facility: HOSPITAL | Age: 58
Discharge: HOME | End: 2023-09-11
Attending: EMERGENCY MEDICINE
Payer: MEDICARE

## 2023-09-11 VITALS
DIASTOLIC BLOOD PRESSURE: 105 MMHG | HEART RATE: 70 BPM | OXYGEN SATURATION: 98 % | SYSTOLIC BLOOD PRESSURE: 176 MMHG | BODY MASS INDEX: 21.34 KG/M2 | WEIGHT: 125 LBS | TEMPERATURE: 98 F | RESPIRATION RATE: 18 BRPM | HEIGHT: 64 IN

## 2023-09-11 DIAGNOSIS — L30.9 DERMATITIS: Primary | ICD-10-CM

## 2023-09-11 DIAGNOSIS — R21 RASH AND NONSPECIFIC SKIN ERUPTION: ICD-10-CM

## 2023-09-11 DIAGNOSIS — T78.40XA ALLERGIC REACTION, INITIAL ENCOUNTER: ICD-10-CM

## 2023-09-11 PROCEDURE — 99284 EMERGENCY DEPT VISIT MOD MDM: CPT | Mod: 25

## 2023-09-11 PROCEDURE — 25000003 PHARM REV CODE 250

## 2023-09-11 PROCEDURE — 96372 THER/PROPH/DIAG INJ SC/IM: CPT | Mod: 59

## 2023-09-11 PROCEDURE — 96374 THER/PROPH/DIAG INJ IV PUSH: CPT

## 2023-09-11 PROCEDURE — 63600175 PHARM REV CODE 636 W HCPCS: Mod: UD

## 2023-09-11 RX ORDER — FAMOTIDINE 10 MG/ML
20 INJECTION INTRAVENOUS
Status: COMPLETED | OUTPATIENT
Start: 2023-09-11 | End: 2023-09-11

## 2023-09-11 RX ORDER — DEXAMETHASONE SODIUM PHOSPHATE 4 MG/ML
8 INJECTION, SOLUTION INTRA-ARTICULAR; INTRALESIONAL; INTRAMUSCULAR; INTRAVENOUS; SOFT TISSUE
Status: COMPLETED | OUTPATIENT
Start: 2023-09-11 | End: 2023-09-11

## 2023-09-11 RX ORDER — HYDROCORTISONE 25 MG/G
CREAM TOPICAL 2 TIMES DAILY
Status: DISCONTINUED | OUTPATIENT
Start: 2023-09-11 | End: 2023-09-11

## 2023-09-11 RX ORDER — METHYLPREDNISOLONE 4 MG/1
TABLET ORAL
Qty: 21 EACH | Refills: 0 | Status: SHIPPED | OUTPATIENT
Start: 2023-09-11 | End: 2023-10-02

## 2023-09-11 RX ORDER — HYDROCORTISONE 1 %
CREAM (GRAM) TOPICAL
Status: COMPLETED | OUTPATIENT
Start: 2023-09-11 | End: 2023-09-11

## 2023-09-11 RX ORDER — HYDROCORTISONE 25 MG/G
CREAM TOPICAL ONCE
Status: DISCONTINUED | OUTPATIENT
Start: 2023-09-11 | End: 2023-09-11

## 2023-09-11 RX ORDER — DIPHENHYDRAMINE HCL 25 MG
25 CAPSULE ORAL
Status: COMPLETED | OUTPATIENT
Start: 2023-09-11 | End: 2023-09-11

## 2023-09-11 RX ORDER — FAMOTIDINE 20 MG/1
40 TABLET, FILM COATED ORAL 2 TIMES DAILY
Status: DISCONTINUED | OUTPATIENT
Start: 2023-09-11 | End: 2023-09-11

## 2023-09-11 RX ORDER — HYDROCORTISONE 25 MG/G
OINTMENT TOPICAL 2 TIMES DAILY
Qty: 20 G | Refills: 0 | OUTPATIENT
Start: 2023-09-11 | End: 2023-09-17

## 2023-09-11 RX ADMIN — DEXAMETHASONE SODIUM PHOSPHATE 8 MG: 4 INJECTION, SOLUTION INTRA-ARTICULAR; INTRALESIONAL; INTRAMUSCULAR; INTRAVENOUS; SOFT TISSUE at 04:09

## 2023-09-11 RX ADMIN — FAMOTIDINE 20 MG: 10 INJECTION INTRAVENOUS at 05:09

## 2023-09-11 RX ADMIN — DIPHENHYDRAMINE HYDROCHLORIDE 25 MG: 25 CAPSULE ORAL at 04:09

## 2023-09-11 RX ADMIN — HYDROCORTISONE: 1 CREAM TOPICAL at 06:09

## 2023-09-11 NOTE — ED PROVIDER NOTES
"Encounter Date: 9/11/2023       History     Chief Complaint   Patient presents with    Rash     Neck, back , BL arms, and chest since Friday after "spraying [employer] perfume" on her     57-year-old female presents with an allergic reaction after applying some perfume at the [employer] store.  Patient states she sprayed the perfume Friday and began with symptoms that have progressively gotten worse through the weekend.  She has a rash that is covering her neck circumferentially and starting to spread down her chest.  There are areas of patchy dry skin.  And 1 area of moist weeping skin on the front of her neck.  Patient reports severe itching accompanied with a rash.  Denies shortness of breath.  Patient has a history of alcoholic liver disease and cirrhosis, hyperlipidemia, hypertension, gastric ulcer, pancreatic cancer, pancreatic mass, thrombocytopenia, stomach ulcer.  Denies smoking.  Does drink alcohol approximately 7 drinks per week self-reported.    The history is provided by the patient.     Review of patient's allergies indicates:   Allergen Reactions    Opioids - morphine analogues Itching     Past Medical History:   Diagnosis Date    Alcohol induced acute pancreatitis     Alcoholic cirrhosis of liver without ascites     Alcoholic liver disease     Gastric ulcer, unspecified as acute or chronic, without hemorrhage or perforation     Hyperlipidemia     Hypertension     Left-sided low back pain with left-sided sciatica     Leucopenia 9/12/2018    Normochromic normocytic anemia 9/12/2018    Pancreatic cancer 9/12/2018    Pancreatic mass 9/12/2018    Pancreatitis     Stomach ulcer     Thrombocytopenia 9/12/2018    Unspecified cirrhosis of liver      Past Surgical History:   Procedure Laterality Date    COLONOSCOPY N/A 8/19/2022    Procedure: COLONOSCOPY;  Surgeon: Simon Corrigan III, MD;  Location: [facility];  Service: Endoscopy;  Laterality: N/A;    ENDOSCOPIC ULTRASOUND OF UPPER GASTROINTESTINAL TRACT " N/A 8/19/2022    Procedure: ULTRASOUND, UPPER GI TRACT, ENDOSCOPIC;  Surgeon: Simon Corrigan III, MD;  Location[facility];  Service: Endoscopy;  Laterality: N/A;    ESOPHAGOGASTRODUODENOSCOPY      PARTIAL HYSTERECTOMY       Family History   Problem Relation Age of Onset    Hypertension Mother     Kidney failure Mother     Hypertension Father     Heart attack Father      Social History     Tobacco Use    Smoking status: Never    Smokeless tobacco: Never   Substance Use Topics    Alcohol use: Yes     Alcohol/week: 2.0 standard drinks of alcohol     Types: 2 Cans of beer per week     Comment: socially    Drug use: Yes     Frequency: 7.0 times per week     Types: Marijuana     Comment: daily     Review of Systems   Respiratory:  Negative for wheezing.    Skin:  Positive for rash.        Patchy scaly rash on neck   All other systems reviewed and are negative.      Physical Exam     Initial Vitals [09/11/23 1536]   BP Pulse Resp Temp SpO2   (!) 176/105 70 18 98.3 °F (36.8 °C) 98 %      MAP       --         Physical Exam    Constitutional: She appears well-developed and well-nourished. No distress.   HENT:   Head: Normocephalic.   Eyes: Pupils are equal, round, and reactive to light.   Neck:   Normal range of motion.  Musculoskeletal:      Cervical back: Normal range of motion.     Neurological: She is alert and oriented to person, place, and time.   Skin: Skin is warm and dry. Capillary refill takes less than 2 seconds. Rash noted.   Erythemic urticarial rash noted to neck circumferentially.  Excoriated in the center of neck.  From scratching.    Psychiatric: She has a normal mood and affect.         ED Course   Procedures  Labs Reviewed - No data to display       Imaging Results    None          Medications   diphenhydrAMINE capsule 25 mg (25 mg Oral Given 9/11/23 1607)   dexAMETHasone injection 8 mg (8 mg Intramuscular Given 9/11/23 1607)   famotidine (PF) injection 20 mg (20 mg Intravenous Given 9/11/23 4709)    hydrocortisone 1 % cream ( Topical (Top) Given 9/11/23 1828)     Medical Decision Making  Chief complaint allergic reaction and rash to neck from perfume use.  Urgent considerations include anaphylaxis, allergic dermatitis, contact dermatitis, eczema, angioedema    57-year-old female presents for emergent evaluation of contact dermatitis on her neck after using a perfume at the [employer] store.  Patient states it started approximately 2 days ago after she sprain perfume on her neck.  She denies shortness of breath.  Lungs are clear.  No wheezing noted.  Patient states the rash is very itchy and she is been scratching it.  Excoriation and weeping noted to the center of her neck.  Patchy scaly dry rash noted circumferentially to the rest of the neck.  Oropharynx is clear.  Patient treated with 25 mg Benadryl, Pepcid, and hydrocortisone cream.  8 mg Decadron IM given.  Patient responded well to treatment and reports relief of itching and pain associated with rash.  I believe patient has contact dermatitis from perfume.  She is stable to discharge home with Medrol Dosepak and hydrocortisone ointment to apply to rash twice today.The likelihood of other entities in the differential is insufficient to justify any further testing for them.  This was explained to the patient. The patient was advised that persistent or worsening symptoms would require further evaluations. Returned precautions discussed and discharge plan reviewed. Patient verbalizes understanding and when to return the emergency room.      Risk  OTC drugs.  Prescription drug management.                               Clinical Impression:   Final diagnoses:  [L30.9] Dermatitis (Primary)  [R21] Rash and nonspecific skin eruption  [T78.40XA] Allergic reaction, initial encounter        ED Disposition Condition    Discharge Stable          ED Prescriptions       Medication Sig Dispense Start Date End Date Auth. Provider    methylPREDNISolone (MEDROL DOSEPACK) 4 mg  tablet use as directed 21 each 9/11/2023 10/2/2023 Priscila Stuart NP    hydrocortisone 2.5 % ointment Apply topically 2 (two) times daily. for 5 days 20 g 9/11/2023 9/16/2023 Priscila Stuart NP          Follow-up Information       Follow up With Specialties Details Why Contact Info Additional Information    [facility]  Go to  As needed, If symptoms worsen [address] [facility] [address]  [phone]       [facility] - Emergency Dept Emergency Medicine Go to  As needed, If symptoms worsen [address] [facility] [address]  [phone] 1st floor             Priscila Stuart NP  09/11/23 3224

## 2023-09-11 NOTE — Clinical Note
"Fariba"El Gamez was seen and treated in our emergency department on 9/11/2023.  She may return to work on 09/14/2023.       If you have any questions or concerns, please don't hesitate to call.      Priscila Stuart NP"

## 2023-09-17 ENCOUNTER — HOSPITAL ENCOUNTER (EMERGENCY)
Facility: HOSPITAL | Age: 58
Discharge: HOME | End: 2023-09-17
Attending: EMERGENCY MEDICINE
Payer: MEDICARE

## 2023-09-17 VITALS
TEMPERATURE: 98 F | DIASTOLIC BLOOD PRESSURE: 82 MMHG | BODY MASS INDEX: 21.34 KG/M2 | HEART RATE: 69 BPM | RESPIRATION RATE: 18 BRPM | WEIGHT: 125 LBS | OXYGEN SATURATION: 99 % | SYSTOLIC BLOOD PRESSURE: 115 MMHG | HEIGHT: 64 IN

## 2023-09-17 DIAGNOSIS — T78.40XA ALLERGIC REACTION, INITIAL ENCOUNTER: Primary | ICD-10-CM

## 2023-09-17 LAB
ALBUMIN SERPL BCP-MCNC: 3.6 G/DL (ref 3.5–5.2)
ALP SERPL-CCNC: 73 U/L (ref 55–135)
ALT SERPL W/O P-5'-P-CCNC: 16 U/L (ref 10–44)
ANION GAP SERPL CALC-SCNC: 10 MMOL/L (ref 8–16)
AST SERPL-CCNC: 18 U/L (ref 10–40)
BASOPHILS # BLD AUTO: 0.06 K/UL (ref 0–0.2)
BASOPHILS NFR BLD: 0.5 % (ref 0–1.9)
BILIRUB SERPL-MCNC: 1 MG/DL (ref 0.1–1)
BUN SERPL-MCNC: 10 MG/DL (ref 6–20)
CALCIUM SERPL-MCNC: 8.8 MG/DL (ref 8.7–10.5)
CHLORIDE SERPL-SCNC: 103 MMOL/L (ref 95–110)
CO2 SERPL-SCNC: 22 MMOL/L (ref 23–29)
CREAT SERPL-MCNC: 0.7 MG/DL (ref 0.5–1.4)
DIFFERENTIAL METHOD: ABNORMAL
EOSINOPHIL # BLD AUTO: 0.7 K/UL (ref 0–0.5)
EOSINOPHIL NFR BLD: 5.9 % (ref 0–8)
ERYTHROCYTE [DISTWIDTH] IN BLOOD BY AUTOMATED COUNT: 12.9 % (ref 11.5–14.5)
EST. GFR  (NO RACE VARIABLE): >60 ML/MIN/1.73 M^2
GLUCOSE SERPL-MCNC: 90 MG/DL (ref 70–110)
HCT VFR BLD AUTO: 40.5 % (ref 37–48.5)
HGB BLD-MCNC: 14.3 G/DL (ref 12–16)
IMM GRANULOCYTES # BLD AUTO: 0.09 K/UL (ref 0–0.04)
IMM GRANULOCYTES NFR BLD AUTO: 0.8 % (ref 0–0.5)
LYMPHOCYTES # BLD AUTO: 2.9 K/UL (ref 1–4.8)
LYMPHOCYTES NFR BLD: 26.6 % (ref 18–48)
MCH RBC QN AUTO: 33.2 PG (ref 27–31)
MCHC RBC AUTO-ENTMCNC: 35.3 G/DL (ref 32–36)
MCV RBC AUTO: 94 FL (ref 82–98)
MONOCYTES # BLD AUTO: 0.6 K/UL (ref 0.3–1)
MONOCYTES NFR BLD: 5.3 % (ref 4–15)
NEUTROPHILS # BLD AUTO: 6.7 K/UL (ref 1.8–7.7)
NEUTROPHILS NFR BLD: 60.9 % (ref 38–73)
NRBC BLD-RTO: 0 /100 WBC
PLATELET # BLD AUTO: 292 K/UL (ref 150–450)
PMV BLD AUTO: 8.3 FL (ref 9.2–12.9)
POTASSIUM SERPL-SCNC: 4.3 MMOL/L (ref 3.5–5.1)
PROT SERPL-MCNC: 6.7 G/DL (ref 6–8.4)
RBC # BLD AUTO: 4.31 M/UL (ref 4–5.4)
SODIUM SERPL-SCNC: 135 MMOL/L (ref 136–145)
WBC # BLD AUTO: 10.98 K/UL (ref 3.9–12.7)

## 2023-09-17 PROCEDURE — 25000003 PHARM REV CODE 250

## 2023-09-17 PROCEDURE — 96375 TX/PRO/DX INJ NEW DRUG ADDON: CPT

## 2023-09-17 PROCEDURE — 63600175 PHARM REV CODE 636 W HCPCS: Mod: UD

## 2023-09-17 PROCEDURE — 80053 COMPREHEN METABOLIC PANEL: CPT

## 2023-09-17 PROCEDURE — 96374 THER/PROPH/DIAG INJ IV PUSH: CPT

## 2023-09-17 PROCEDURE — 85025 COMPLETE CBC W/AUTO DIFF WBC: CPT

## 2023-09-17 PROCEDURE — 99284 EMERGENCY DEPT VISIT MOD MDM: CPT | Mod: 25

## 2023-09-17 RX ORDER — PREDNISONE 20 MG/1
40 TABLET ORAL DAILY
Qty: 10 TABLET | Refills: 0 | Status: SHIPPED | OUTPATIENT
Start: 2023-09-17 | End: 2023-09-22

## 2023-09-17 RX ORDER — METHYLPREDNISOLONE SOD SUCC 125 MG
125 VIAL (EA) INJECTION
Status: COMPLETED | OUTPATIENT
Start: 2023-09-17 | End: 2023-09-17

## 2023-09-17 RX ORDER — DIPHENHYDRAMINE HYDROCHLORIDE 50 MG/ML
12.5 INJECTION INTRAMUSCULAR; INTRAVENOUS
Status: COMPLETED | OUTPATIENT
Start: 2023-09-17 | End: 2023-09-17

## 2023-09-17 RX ORDER — HYDROCORTISONE 25 MG/G
OINTMENT TOPICAL 2 TIMES DAILY
Qty: 28.35 G | Refills: 0 | Status: SHIPPED | OUTPATIENT
Start: 2023-09-17 | End: 2023-09-27

## 2023-09-17 RX ORDER — FAMOTIDINE 10 MG/ML
40 INJECTION INTRAVENOUS
Status: COMPLETED | OUTPATIENT
Start: 2023-09-17 | End: 2023-09-17

## 2023-09-17 RX ADMIN — FAMOTIDINE 40 MG: 10 INJECTION INTRAVENOUS at 04:09

## 2023-09-17 RX ADMIN — METHYLPREDNISOLONE SODIUM SUCCINATE 125 MG: 125 INJECTION, POWDER, FOR SOLUTION INTRAMUSCULAR; INTRAVENOUS at 04:09

## 2023-09-17 RX ADMIN — DIPHENHYDRAMINE HYDROCHLORIDE 12.5 MG: 50 INJECTION INTRAMUSCULAR; INTRAVENOUS at 04:09

## 2023-09-17 NOTE — ED PROVIDER NOTES
"Encounter Date: 9/17/2023       History     Chief Complaint   Patient presents with    Allergic Reaction     Pt's states " you see my neck, my face is swollen" reports "bumps" to generalized body, redness to body, L sided facial swelling, symptoms since yesterday, worsening today     DENIES TONGUE/THROAT SWELLING, DENIES SOB      Patient presents complaining of rash to the neck face and creases of the elbows.  Symptoms started in the past 24 hours.  Patient was seen by a medical screening exam and had initial labs and Solu-Medrol, Pepcid, Benadryl ordered prior to my evaluation.  Upon my evaluation patient is in no acute distress.      Review of patient's allergies indicates:   Allergen Reactions    Opioids - morphine analogues Itching     Past Medical History:   Diagnosis Date    Alcohol induced acute pancreatitis     Alcoholic cirrhosis of liver without ascites     Alcoholic liver disease     Gastric ulcer, unspecified as acute or chronic, without hemorrhage or perforation     Hyperlipidemia     Hypertension     Left-sided low back pain with left-sided sciatica     Leucopenia 9/12/2018    Normochromic normocytic anemia 9/12/2018    Pancreatic cancer 9/12/2018    Pancreatic mass 9/12/2018    Pancreatitis     Stomach ulcer     Thrombocytopenia 9/12/2018    Unspecified cirrhosis of liver      Past Surgical History:   Procedure Laterality Date    COLONOSCOPY N/A 8/19/2022    Procedure: COLONOSCOPY;  Surgeon: Simon Corrigan III, MD;  Location: [facility];  Service: Endoscopy;  Laterality: N/A;    ENDOSCOPIC ULTRASOUND OF UPPER GASTROINTESTINAL TRACT N/A 8/19/2022    Procedure: ULTRASOUND, UPPER GI TRACT, ENDOSCOPIC;  Surgeon: Simon Corrigan III, MD;  Location: [facility];  Service: Endoscopy;  Laterality: N/A;    ESOPHAGOGASTRODUODENOSCOPY      PARTIAL HYSTERECTOMY       Family History   Problem Relation Age of Onset    Hypertension Mother     Kidney failure Mother     Hypertension Father     Heart attack " Father      Social History     Tobacco Use    Smoking status: Never    Smokeless tobacco: Never   Substance Use Topics    Alcohol use: Yes     Alcohol/week: 2.0 standard drinks of alcohol     Types: 2 Cans of beer per week     Comment: socially    Drug use: Yes     Frequency: 7.0 times per week     Types: Marijuana     Comment: daily     Review of Systems   All other systems reviewed and are negative.      Physical Exam     Initial Vitals [09/17/23 1600]   BP Pulse Resp Temp SpO2   104/85 66 18 98 °F (36.7 °C) 97 %      MAP       --         Physical Exam    Nursing note and vitals reviewed.  Constitutional: She appears well-developed and well-nourished.   Pleasant, polite   HENT:   Head: Normocephalic and atraumatic.   Eyes: EOM are normal.   Neck: Neck supple.   Normal range of motion.  Cardiovascular:  Intact distal pulses.           Pulmonary/Chest: No respiratory distress.   Musculoskeletal:      Cervical back: Normal range of motion and neck supple.     Neurological: She is alert and oriented to person, place, and time.   Skin: Skin is warm and dry. Capillary refill takes less than 2 seconds.   There is an eczema type of rash to the antecubital fossa of both upper extremities as well as around decreases of the neck.  There is no erythema warmth or cellulitis.  There is no urticaria.   Psychiatric: She has a normal mood and affect. Her behavior is normal. Judgment and thought content normal.         ED Course   Procedures  Labs Reviewed   CBC W/ AUTO DIFFERENTIAL - Abnormal; Notable for the following components:       Result Value    MCH 33.2 (*)     MPV 8.3 (*)     Immature Granulocytes 0.8 (*)     Immature Grans (Abs) 0.09 (*)     Eos # 0.7 (*)     All other components within normal limits   COMPREHENSIVE METABOLIC PANEL - Abnormal; Notable for the following components:    Sodium 135 (*)     CO2 22 (*)     All other components within normal limits          Imaging Results              X-Ray Chest PA And  Lateral (In process)                      Medications   diphenhydrAMINE injection 12.5 mg (12.5 mg Intravenous Given 9/17/23 1619)   famotidine (PF) injection 40 mg (40 mg Intravenous Given 9/17/23 1619)   methylPREDNISolone sodium succinate injection 125 mg (125 mg Intravenous Given 9/17/23 1619)     Medical Decision Making  Patient's exam is most consistent with an eczema type of rash with some superimposed atopic dermatitis.  Patient will be treated with hydrocortisone ointment as well as prednisone therapy we will be advised to follow up with primary care doctor.  Patient will be discharged in stable condition.  Detailed return precautions discussed.    Amount and/or Complexity of Data Reviewed  Labs:  Decision-making details documented in ED Course.    Risk  Prescription drug management.               ED Course as of 09/17/23 1740   Sun Sep 17, 2023   1722 Sodium(!): 135 [AP]   1722 Potassium: 4.3 [AP]   1722 Chloride: 103 [AP]   1722 CO2(!): 22 [AP]   1722 Glucose: 90 [AP]   1722 BUN: 10 [AP]   1722 Creatinine: 0.7 [AP]   1722 Calcium: 8.8 [AP]   1722 PROTEIN TOTAL: 6.7 [AP]   1722 Albumin: 3.6 [AP]   1722 BILIRUBIN TOTAL: 1.0 [AP]   1722 Alkaline Phosphatase: 73 [AP]   1722 ALT: 16 [AP]   1722 WBC: 10.98 [AP]   1722 Hemoglobin: 14.3 [AP]   1722 Hematocrit: 40.5 [AP]   1722 Platelets: 292 [AP]      ED Course User Index  [AP] Jozef Duong MD                    Clinical Impression:   Final diagnoses:  [T78.40XA] Allergic reaction, initial encounter (Primary)        ED Disposition Condition    Discharge Stable          ED Prescriptions       Medication Sig Dispense Start Date End Date Auth. Provider    predniSONE (DELTASONE) 20 MG tablet Take 2 tablets (40 mg total) by mouth once daily. for 5 days 10 tablet 9/17/2023 9/22/2023 Jozef Duong MD    hydrocortisone 2.5 % ointment Apply topically 2 (two) times daily. for 10 days 28.35 g 9/17/2023 9/27/2023 Jozef Duong MD          Follow-up Information        Follow up With Specialties Details Why Contact Info    [facility]  Schedule an appointment as soon as possible for a visit in 2 days  [address] [facility] [address]  [phone]               Jozef Duong MD  09/17/23 1509

## 2023-09-17 NOTE — FIRST PROVIDER EVALUATION
"Medical screening examination initiated.  I have conducted a focused provider triage encounter, findings are as follows:    Brief history of present illness:  Allergic skin reaction.  Patient was seen here previously with a rash to her neck and chest.  After using perfume.  It is now spread to her arms and hands.  She is complaining of severe itching.    Vitals:    09/17/23 1600   BP: 104/85   BP Location: Left arm   Patient Position: Sitting   Pulse: 66   Resp: 18   Temp: 98 °F (36.7 °C)   TempSrc: Oral   SpO2: 97%   Weight: 56.7 kg (125 lb)   Height: 5' 4" (1.626 m)       Pertinent physical exam:  Dry scaly rash on neck and chest.  Scaly excoriated rash on bilateral arms and hands    Brief workup plan:  IV labs chest x-ray and medications    Preliminary workup initiated; this workup will be continued and followed by the physician or advanced practice provider that is assigned to the patient when roomed.  "

## 2023-09-17 NOTE — Clinical Note
"Fariba"El Gamez was seen and treated in our emergency department on 9/17/2023.  She may return to work on 09/20/2023.       If you have any questions or concerns, please don't hesitate to call.      Jozef Duong MD"

## 2024-02-05 DIAGNOSIS — J44.9 CHRONIC OBSTRUCTIVE PULMONARY DISEASE, UNSPECIFIED: Primary | ICD-10-CM

## 2024-02-15 NOTE — TELEPHONE ENCOUNTER
----- Message from Manjinder Christy MD sent at 11/15/2019 11:49 AM CST -----  Potassium is a little low - need to forward this to the attention of her PCP  
Called patient about her potassium level is 3.2.  Instructed her that we will call into her pharmacy for K-dur 20 mEq PO daily x 10 days #10 to [facility] @ [phone].  
130
140

## 2024-05-09 ENCOUNTER — TELEPHONE (OUTPATIENT)
Dept: HEMATOLOGY/ONCOLOGY | Facility: CLINIC | Age: 59
End: 2024-05-09

## 2024-05-09 NOTE — TELEPHONE ENCOUNTER
Message reviewed with Dr Christy, made aware that patient states she has not been eating as her taste buds are off with everything, carrie N/V/D and thrush, requests an appetite pill, also informed that we are awaiting notes/results from Dr. Corrigan as patient states she just recently saw Dr Corrigan and had scope done. Per Dr Christy's verbal orders I spoke to patient made aware that Dr. Christy is not able to prescribe appetite pill at this time and would like her to keep scheduled appt with him in June, and once we receive results/notes from Dr. Corrigan if there are any new recs I will call her with those. Verbalized understanding and states she has an appt with PCP this week and will also discuss symptoms with them at that appt.

## 2024-05-09 NOTE — TELEPHONE ENCOUNTER
----- Message from Jennifer Peña sent at 5/7/2024  9:53 AM CDT -----  Pt is calling to let us know that she has lost about 25 lbs in the last couple of months. She is asking for something to help with her appetite? She said that her taste buds are numb and not working. She is drinking only liquids.     [phone]

## 2024-05-16 ENCOUNTER — TELEPHONE (OUTPATIENT)
Facility: CLINIC | Age: 59
End: 2024-05-16
Payer: MEDICARE

## 2024-05-16 NOTE — TELEPHONE ENCOUNTER
----- Message from Jennifer Peña sent at 5/15/2024 11:31 AM CDT -----  Pt is asking if she needs to CT Abdomen and Chest X-ray before her appt with Dr. Christy, she is birgit [birth year].     [phone]

## 2024-05-16 NOTE — TELEPHONE ENCOUNTER
After review of chart, I made patient aware that it appears he ordered the CT and Xray at her last appt in June and she had this done in July, instructed to come to regularly scheduled appt and if need be he will place orders at that time. Verbalized understanding.

## 2024-06-11 NOTE — PROGRESS NOTES
"[facility] Hematology/Oncology  PROGRESS NOTE       Subjective:       Patient ID:   NAME: Fariba Gamez : 1965     58 y.o. female    Referring Doc: Rut  Other Physicians: Shekhar Araya, Natty Cummings    Chief Complaint:  Pancreatic mass f/u    History of Present Illness:     Patient returns today for a regularly scheduled follow-up visit.  The patient is here today to go over the results of the recently ordered labs, tests and studies. She is here by herself today.      She reports that she has been feeling "good" ; she has been eating ok. She denies any CP, SOB, HA's or N/V.  Appetite has been poor and her PCP started her on a oral appetite stimulant.      She saw Dr LAINEY Cummings at [facility] on 2024 and had repeat scope; and sees them again in six months.  She reports that she stopped drinking again recently. Patient reports that the scope was stable.    Discussed covid19 precautions - she has not been vaccinated        ROS:   GEN: normal without any fever, night sweats or weight loss  HEENT: normal with no HA's, sore throat, stiff neck, changes in vision  CV: normal with no CP, SOB, PND, MARTIN or orthopnea  PULM: normal with no SOB, cough, hemoptysis, sputum or pleuritic pain  GI: normal with no abdominal pain, nausea, vomiting, constipation, diarrhea, melanotic stools, BRBPR, or hematemesis  : normal with no hematuria, dysuria  BREAST: normal with no mass, discharge, pain  SKIN: normal with no rash, erythema, bruising, or swelling    Allergies:  Review of patient's allergies indicates:  No Known Allergies    Medications:    Current Outpatient Medications:     albuterol (PROVENTIL/VENTOLIN HFA) 90 mcg/actuation inhaler, Inhale 1-2 puffs into the lungs every 4 (four) hours as needed for Wheezing. Rescue, Disp: 18 g, Rfl: 0    albuterol-ipratropium (DUO-NEB) 2.5 mg-0.5 mg/3 mL nebulizer solution, Take 3 mLs by nebulization every 6 (six) hours as needed for Wheezing. Rescue, Disp: 75 mL, Rfl: 0    " amLODIPine (NORVASC) 5 MG tablet, Take 1 tablet (5 mg total) by mouth Daily., Disp: 90 tablet, Rfl: 0    aspirin (ECOTRIN) 81 MG EC tablet, Take 81 mg by mouth once daily., Disp: , Rfl:     atorvastatin (LIPITOR) 20 MG tablet, TAKE 1 TABLET(20 MG) BY MOUTH DAILY (Patient taking differently: Take 20 mg by mouth once daily.), Disp: 90 tablet, Rfl: 0    carvedilol (COREG) 3.125 MG tablet, Take 3.125 mg by mouth Daily., Disp: , Rfl:     carvedilol (COREG) 6.25 MG tablet, TAKE 1 TABLET(6.25 MG) BY MOUTH DAILY, Disp: 90 tablet, Rfl: 0    cetirizine (ZYRTEC) 10 MG tablet, Take 10 mg by mouth once daily., Disp: , Rfl:     CREON CpDR, 1 capsule 3 (three) times daily with meals., Disp: , Rfl: 3    cyclobenzaprine (FLEXERIL) 10 MG tablet, Take 10 mg by mouth 3 (three) times daily., Disp: , Rfl:     ergocalciferol (ERGOCALCIFEROL) 50,000 unit Cap, ergocalciferol (vitamin D2) 1,250 mcg (50,000 unit) capsule, Disp: , Rfl:     fenofibrate (TRICOR) 48 MG tablet, Take 1 tablet (48 mg total) by mouth Daily., Disp: 90 tablet, Rfl: 0    hydrALAZINE (APRESOLINE) 50 MG tablet, Take 50 mg by mouth every 12 (twelve) hours., Disp: , Rfl: 0    hydrALAZINE (APRESOLINE) 50 MG tablet, Take 50 mg by mouth., Disp: , Rfl:     lisinopriL (PRINIVIL,ZESTRIL) 20 MG tablet, Take 20 mg by mouth once daily., Disp: , Rfl:     magnesium oxide (MAG-OX) 400 mg tablet, Take 1 tablet by mouth once daily., Disp: , Rfl: 3    omeprazole (PRILOSEC) 40 MG capsule, Take 40 mg by mouth every morning., Disp: , Rfl:     pantoprazole (PROTONIX) 40 MG tablet, Take 40 mg by mouth once daily., Disp: , Rfl: 0    potassium chloride (MICRO-K) 10 MEQ CpSR, Take 10 mEq by mouth once., Disp: , Rfl:     spironolactone (ALDACTONE) 25 MG tablet, Take 25 mg by mouth once daily., Disp: , Rfl: 3    famotidine (PEPCID) 20 MG tablet, Take 1 tablet (20 mg total) by mouth 2 (two) times daily., Disp: 60 tablet, Rfl: 0    hydrocortisone 2.5 % ointment, Apply topically 2 (two) times daily.  for 10 days, Disp: 28.35 g, Rfl: 0    PMHx/PSHx Updates:  See patient's last visit with me on 6/15/2023  See H&P on 9/12/2018        Pathology:  Cancer Staging  No matching staging information was found for the patient.    Pancreatic mass biopsy  3/14/2019:    Pancreas, core biopsy:  Scant specimen with mostly blood clot, and few entrapped pancreatic acini and partially preserved, superficial intestinal type mucosal fragments - insufficient for definitive diagnostic assessment.      Objective:     Vitals:  Blood pressure 107/69, pulse 65, temperature 97.2 °F (36.2 °C), resp. rate 16, weight 58.6 kg (129 lb 3.2 oz).    Physical Examination:   GEN: no apparent distress, comfortable; AAOx3  HEAD: atraumatic and normocephalic  EYES: no pallor, no icterus, PERRLA  ENT: OMM, no pharyngeal erythema, external ears WNL; no nasal discharge; no thrush  NECK: no masses, thyroid normal, trachea midline, no LAD/LN's, supple  CV: RRR with no murmur; normal pulse; normal S1 and S2; no current pedal edema  CHEST: Normal respiratory effort; CTAB; normal breath sounds; no wheeze or crackles  ABDOM: nontender; soft; normal bowel sounds; no rebound/guarding; mildly distended   MUSC/Skeletal: ROM normal; no crepitus; joints normal; no deformities or arthropathy  EXTREM: no clubbing, cyanosis, inflammation or swelling; no current edema  SKIN: no rashes, lesions, ulcers, petechiae    or subcutaneous nodules  : no peña  NEURO: grossly intact; motor/sensory WNL; AAOx3; no tremors  PSYCH: normal mood, affect and behavior  LYMPH: normal cervical, supraclavicular, axillary and groin LN's            Labs:       No labs since Sept 2023      Radiology/Diagnostic Studies:    US abdom  1/31/2022:    IMPRESSION:  1. Mild morphologic features of cirrhosis without mass to suggest hepatocellular carcinoma.  2. Cholelithiasis without acute cholecystitis.  3. Heterogeneous appearing pancreatic parenchyma with macroscopic calcification compatible with  chronic pancreatitis. No ductal dilation or discrete mass is identified.  4. No ascites present.            Ct abdom  12/5/2019:  Impression       1. 11 mm heterogeneously enhancing structure of the head of the pancreas is again identified, slightly less conspicuous when compared with previous exam.  2. There is a new focus of hypoenhancement within the body of the pancreas measuring 9 mm in diameter, which may reflect a dilated side duct, or developing cystic lesion.  3. No interval change of 12 mm celiac axis lymph node.  4. Cholelithiasis.  5. Small hiatal hernia.               CT Abdom 2/7/2019    IMPRESSION:    1. Poorly defined round slightly heterogeneous focus on early arterial phase imaging in pancreatic head measuring up to 11 mm in size, occurring at site of previous cystic-appearing pancreatic mass on 02/03/2017 MRI. It is likely that  this has not significantly changed in size, although comparison on current CT imaging with that of prior MR imaging is suboptimal. In addition to the previously reported differential diagnosis, cystic islet cell tumor is an additional consideration. Considerations for further evaluation include  endoscopic ultrasound targeted to the pancreatic head with potential tissue sampling of the potential persistent pancreatic head mass and/or repeat pancreatic protocol MRI abdomen without and with IV contrast for more precise comparison with the prior MRI.    2. New enlarged 12 mm celiac axis lymph node could either be reactive in nature due to infectious or inflammatory process or indicate neoplasm such as lymphoma  or metastatic disease. There is no additional evidence of metastatic disease or enlarged lymph nodes however. It is conceivable this too could be reassessed at time of potential endoscopic ultrasound, although if this is unable to be performed, short-term imaging follow-up with either CT or MRI of the abdomen would be helpful to evaluate for stability.    3. Resolution  of acute pancreatitis since 07/23/2018, now with few scattered pancreatic parenchymal calcifications which could indicate sequelae of chronic  pancreatitis.    4. Cholelithiasis.    5. Mild bilateral urothelial enhancement can be seen with urinary tract infection or reflux.    6. 37 mm left ovarian cyst is new since 07/23/2018. This is statistically likely of benign etiology and may be physiologic in nature. Further evaluation with ultrasound pelvis in 12 weeks is suggested to evaluate for resolution.    7. Unchanged 4 mm right middle lobe nodule.      CXR  2/7/2019:    IMPRESSION: No acute intrathoracic abnormality seen              Ct Chest Abdoment Pelvis With Contrast    Result Date: 9/26/2018  EXAMINATION: CT CHEST ABDOMEN PELVIS WITH CONTRAST (XPD) CLINICAL HISTORY: panc mass; Disease of pancreas, unspecified TECHNIQUE: Low dose axial images, sagittal and coronal reformations were obtained from the thoracic inlet to the pubic symphysis following the IV administration of 75 mL of Omnipaque 350 . No oral contrast was administered.  Pancreatic mass protocol was implemented. COMPARISON: No relevant prior. FINDINGS: There is a heterogeneous exophytic left thyroid nodule measuring 2.0 cm.  Recommend thyroid ultrasound for further evaluation. Heart is normal in size.  Pulmonary arteries distribute normally.  Aorta maintains a normal caliber throughout the thorax and abdomen. No mediastinal lymphadenopathy.  Prominent right hilar lymph node noted. The trachea and proximal airways are patent.  The lungs are equally well expanded.  No large consolidative opacity.  There is minimal platelike atelectasis at the right lung base.  No pleural effusion.  No pneumothorax.  There is a small, 0.4 cm nodule within the lateral segment of the right middle lobe (series 3, image 70). The liver appears normal in size.  No focal hepatic lesions.  Portal vein is patent. There are some small calcified gallstones.  No biliary ductal  dilatation.  Spleen, adrenal glands, and stomach are unremarkable. In the expected region of the pancreatic head, there is a poorly marginated, subtle area of relative hyper enhancement measuring 1.3 cm, possibly representing the patient's reported pancreatic head mass.  Remaining pancreatic tissue demonstrates extensive atrophy. Bowel shows no evidence of inflammation or obstruction. Kidneys are normal in size.  There is mild right pelviectasis.  Visualized portions of the ureters are unremarkable.  There is asymmetric right greater than left urinary bladder wall thickening.  Right adnexal hypodensity likely represents an ovarian cyst. Aorta maintains a normal caliber. Soft tissues are unremarkable.  Osseous structures demonstrate no significant abnormality.     In the expected region of the pancreatic head, there is a poorly marginated, inconspicuous area of lesion like enhancement measuring 1.3 cm, possibly representing the patient's reported pancreatic head mass.  Correlation with EUS is recommended. 0.4 cm nodule within the lateral segment of the right middle lobe.  In a low risk patient, no further follow-up is recommended.  In a high-risk patient (history of smoking and/or malignancy) consider follow-up chest CT in 12 months. Heterogeneous, exophytic left thyroid nodule measuring 2.0 cm.  Thyroid ultrasound is recommended for further evaluation. Mild right pelviectasis and asymmetric right urinary bladder wall thickening. Clinical correlation recommended. Probable right ovarian cyst. This report was flagged in Epic as abnormal. Electronically signed by resident: Isacc Jackson Date:    09/26/2018 Time:    11:21 Electronically signed by: Rambo Childers MD Date:    09/26/2018 Time:    11:54      I have reviewed all available lab results and radiology reports.    Assessment/Plan:   (1) 58 y.o. female with diagnosis of pancreatic mass in head of pancrease seen on Ultrasonography on 8/5  - she is followed by   [facility] with GI  - she saw Dr Corrigan/Dr Parikh per my request, had EUS, and she reports that he was unable to identify any pancreatic mass but she had some inflammation.   - she saw Dr Enriquez with Surg-Onc at [facility] on 9/26/2018 and he was awaiting reports from Dr Parikh  - she saw Dr Vanessa with GI on 12/4/2018  - she had last scans on 2/7/2019, and was supposed to get MRI but did not have it done  - she had pancreatic mass biopsy on 4/14/2019 with Dr Parikh which appears to have been nondiagnostic  - she saw Dr Parikh in Nov 2019 and Dr Vanessa just yesterday  - she previously never did the recommended MRI that had been scheduled  - repeat CT abdom was done on 12/5/2019 9/21/2020:  - she sees Dr Parikh again next year  - she had some labs done recently at [facility]    1/31/2022:  - she had repeat US of abdom earlier today with report as detailed above  - she sees Dr Corrigan again in near future and Dr parikh in Feb 2022  - she needs up to date labs    6/16/2022:  - she had ;labs done with Dr De Los Santos   - to see Dr Parikh in July 2022    6/15/2023:  - she is having repeat scopes with Dr Corrigan next week  - she started drinking again  - latest Hgb, plat and wbc are adequate  - CEA 10.4    6/13/2024:  - she saw Dr SONAL Parikh in Apr 2024 and had repeat scopes  - she sees them again in Oct 2024  - due for repeat labs     (2) Alcoholic liver disease/cirrhosis and prior alcoholic pancreatitis  - she has been sober for several months     (3) HTN and hypercholesterolemia     (4) prior stomach ulcers     (5) NCNC anemia, leucopenia and mild thrombocytopenia - most likely related to her liver disease       (6) Noncompliance issues which hinders my ability to provide her with care    (7) Left ovarian cyst     (8) Recent opthalmologic visit - need notes      6/16/2022:  - [facility] in [place] and is now on an oral medication for her eyes              Normochromic normocytic anemia    Leukopenia, unspecified  type    Thrombocytopenia    Pancreatic mass          PLAN:  1.  F/u with GI/hepatology in Oct 2024  2. Check up to date labs incl CEA and AFP  3. F/u with PCP  4. Check up to date labs every 6 months (encouarged)       RTC in 12 months         Fax note to  [facility] Corrigan V. Joshi, [place], [place] (new PCP)    Discussion:     COVID-19 Discussion:    I had long discussion with patient and any applicable family about the COVID-19 coronavirus epidemic and the recommended precautions with regard to cancer and/or hematology patients. I have re-iterated the CDC recommendations for adequate hand washing, use of hand -like products, and coughing into elbow, etc. In addition, especially for our patients who are on chemotherapy and/or our otherwise immunocompromised patients, I have recommended avoidance of crowds, including movie theaters, restaurants, churches, etc. I have recommended avoidance of any sick or symptomatic family members and/or friends. I have also recommended avoidance of any raw and unwashed food products, and general avoidance of food items that have not been prepared by themselves. The patient has been asked to call us immediately with any symptom developments, issues, questions or other general concerns.       I spent over 25 mins of time with the patient. Reviewed results of the recently ordered labs, tests and studies; made directives with regards to the results. Over half of this time was spent couseling and coordinating care.    I have explained all of the above in detail and the patient understands all of the current recommendation(s). I have answered all of their questions to the best of my ability and to their complete satisfaction.   The patient is to continue with the current management plan.              Electronically signed by Manjinder Christy MD

## 2024-06-12 ENCOUNTER — TELEPHONE (OUTPATIENT)
Facility: CLINIC | Age: 59
End: 2024-06-12
Payer: MEDICARE

## 2024-06-12 NOTE — TELEPHONE ENCOUNTER
Self pay amount would be $145.98 per pt access supv,  This information was relayed to the pt. The pt wishes to keep her appt tomorrow and be seen by the provider and be billed for the visit Discussed with CARLOS MANUEL Bro and the pt can keep her appt and discuss possibly referring to outside HemOnc with the provider. Pt is anticipating being billed for the appt as she does not have cash for the payment

## 2024-06-12 NOTE — TELEPHONE ENCOUNTER
This RN supv called the pt to notify that [facility] has notified the clinic that the pt insurance is not accepted and payment may be expected for the visit at the time of service. This was discussed with the clinic mrg who has reached out the the patient access supv.  Pt is to decide if cash pay visit is to proceed. Pt is requesting a dollar amount. Will call pt back when this information is available.

## 2024-06-13 ENCOUNTER — OFFICE VISIT (OUTPATIENT)
Facility: CLINIC | Age: 59
End: 2024-06-13
Payer: MEDICARE

## 2024-06-13 VITALS
TEMPERATURE: 97 F | BODY MASS INDEX: 22.18 KG/M2 | SYSTOLIC BLOOD PRESSURE: 107 MMHG | RESPIRATION RATE: 16 BRPM | WEIGHT: 129.19 LBS | HEART RATE: 65 BPM | DIASTOLIC BLOOD PRESSURE: 69 MMHG

## 2024-06-13 DIAGNOSIS — K86.89 PANCREATIC MASS: ICD-10-CM

## 2024-06-13 DIAGNOSIS — D64.9 NORMOCHROMIC NORMOCYTIC ANEMIA: Primary | ICD-10-CM

## 2024-06-13 DIAGNOSIS — K76.9 LIVER DISEASE: ICD-10-CM

## 2024-06-13 DIAGNOSIS — D72.819 LEUKOPENIA, UNSPECIFIED TYPE: ICD-10-CM

## 2024-06-13 DIAGNOSIS — D69.6 THROMBOCYTOPENIA: ICD-10-CM

## 2024-06-13 PROCEDURE — G2211 COMPLEX E/M VISIT ADD ON: HCPCS | Mod: S$GLB,,, | Performed by: INTERNAL MEDICINE

## 2024-06-13 PROCEDURE — 99215 OFFICE O/P EST HI 40 MIN: CPT | Mod: PBBFAC,PN | Performed by: INTERNAL MEDICINE

## 2024-06-13 PROCEDURE — 99213 OFFICE O/P EST LOW 20 MIN: CPT | Mod: S$GLB,,, | Performed by: INTERNAL MEDICINE

## 2024-06-13 PROCEDURE — 99999 PR PBB SHADOW E&M-EST. PATIENT-LVL V: CPT | Mod: PBBFAC,,, | Performed by: INTERNAL MEDICINE

## 2024-06-14 ENCOUNTER — TELEPHONE (OUTPATIENT)
Dept: HEMATOLOGY/ONCOLOGY | Facility: CLINIC | Age: 59
End: 2024-06-14
Payer: MEDICARE

## 2024-06-14 NOTE — TELEPHONE ENCOUNTER
I called patient to provide supportive services.  She stated that she needs assistance with reading her mail and deciphering actions needed.  I explained that I do not have anyone that would assist with this request.  I asked if she had a family member or friend that could assist and she responded yes.  I asked if she had additional needs at this time; she responded no.

## 2024-08-10 ENCOUNTER — HOSPITAL ENCOUNTER (EMERGENCY)
Facility: HOSPITAL | Age: 59
Discharge: HOME | End: 2024-08-10
Attending: EMERGENCY MEDICINE

## 2024-08-10 VITALS
OXYGEN SATURATION: 100 % | DIASTOLIC BLOOD PRESSURE: 83 MMHG | HEIGHT: 64 IN | HEART RATE: 69 BPM | WEIGHT: 109 LBS | BODY MASS INDEX: 18.61 KG/M2 | TEMPERATURE: 99 F | RESPIRATION RATE: 16 BRPM | SYSTOLIC BLOOD PRESSURE: 121 MMHG

## 2024-08-10 DIAGNOSIS — K52.9 ENTERITIS: Primary | ICD-10-CM

## 2024-08-10 LAB
ALBUMIN SERPL BCP-MCNC: 4.2 G/DL (ref 3.5–5.2)
ALP SERPL-CCNC: 84 U/L (ref 55–135)
ALT SERPL W/O P-5'-P-CCNC: 33 U/L (ref 10–44)
ANION GAP SERPL CALC-SCNC: 9 MMOL/L (ref 8–16)
AST SERPL-CCNC: 62 U/L (ref 10–40)
BASOPHILS # BLD AUTO: 0.05 K/UL (ref 0–0.2)
BASOPHILS NFR BLD: 1 % (ref 0–1.9)
BILIRUB SERPL-MCNC: 0.6 MG/DL (ref 0.1–1)
BILIRUB UR QL STRIP: NEGATIVE
BUN SERPL-MCNC: 6 MG/DL (ref 6–20)
CALCIUM SERPL-MCNC: 9.2 MG/DL (ref 8.7–10.5)
CHLORIDE SERPL-SCNC: 105 MMOL/L (ref 95–110)
CLARITY UR: CLEAR
CO2 SERPL-SCNC: 24 MMOL/L (ref 23–29)
COLOR UR: COLORLESS
CREAT SERPL-MCNC: 0.6 MG/DL (ref 0.5–1.4)
CREAT SERPL-MCNC: 0.7 MG/DL (ref 0.5–1.4)
DIFFERENTIAL METHOD BLD: ABNORMAL
EOSINOPHIL # BLD AUTO: 0.1 K/UL (ref 0–0.5)
EOSINOPHIL NFR BLD: 2 % (ref 0–8)
ERYTHROCYTE [DISTWIDTH] IN BLOOD BY AUTOMATED COUNT: 13.3 % (ref 11.5–14.5)
EST. GFR  (NO RACE VARIABLE): >60 ML/MIN/1.73 M^2
GLUCOSE SERPL-MCNC: 93 MG/DL (ref 70–110)
GLUCOSE UR QL STRIP: NEGATIVE
HCT VFR BLD AUTO: 39.1 % (ref 37–48.5)
HGB BLD-MCNC: 14 G/DL (ref 12–16)
HGB UR QL STRIP: NEGATIVE
IMM GRANULOCYTES # BLD AUTO: 0.01 K/UL (ref 0–0.04)
IMM GRANULOCYTES NFR BLD AUTO: 0.2 % (ref 0–0.5)
KETONES UR QL STRIP: NEGATIVE
LEUKOCYTE ESTERASE UR QL STRIP: NEGATIVE
LIPASE SERPL-CCNC: 3 U/L (ref 4–60)
LYMPHOCYTES # BLD AUTO: 2.2 K/UL (ref 1–4.8)
LYMPHOCYTES NFR BLD: 43.4 % (ref 18–48)
MCH RBC QN AUTO: 35.2 PG (ref 27–31)
MCHC RBC AUTO-ENTMCNC: 35.8 G/DL (ref 32–36)
MCV RBC AUTO: 98 FL (ref 82–98)
MONOCYTES # BLD AUTO: 0.7 K/UL (ref 0.3–1)
MONOCYTES NFR BLD: 14.5 % (ref 4–15)
NEUTROPHILS # BLD AUTO: 2 K/UL (ref 1.8–7.7)
NEUTROPHILS NFR BLD: 38.9 % (ref 38–73)
NITRITE UR QL STRIP: NEGATIVE
NRBC BLD-RTO: 0 /100 WBC
PH UR STRIP: 5 [PH] (ref 5–8)
PLATELET # BLD AUTO: 161 K/UL (ref 150–450)
PMV BLD AUTO: 9 FL (ref 9.2–12.9)
POTASSIUM SERPL-SCNC: 3.7 MMOL/L (ref 3.5–5.1)
PROT SERPL-MCNC: 7 G/DL (ref 6–8.4)
PROT UR QL STRIP: NEGATIVE
RBC # BLD AUTO: 3.98 M/UL (ref 4–5.4)
SAMPLE: NORMAL
SODIUM SERPL-SCNC: 138 MMOL/L (ref 136–145)
SP GR UR STRIP: <1.005 (ref 1–1.03)
URN SPEC COLLECT METH UR: ABNORMAL
UROBILINOGEN UR STRIP-ACNC: NEGATIVE EU/DL
WBC # BLD AUTO: 5.09 K/UL (ref 3.9–12.7)

## 2024-08-10 PROCEDURE — 96374 THER/PROPH/DIAG INJ IV PUSH: CPT

## 2024-08-10 PROCEDURE — 25500020 PHARM REV CODE 255: Performed by: NURSE PRACTITIONER

## 2024-08-10 PROCEDURE — 83690 ASSAY OF LIPASE: CPT | Performed by: EMERGENCY MEDICINE

## 2024-08-10 PROCEDURE — 99285 EMERGENCY DEPT VISIT HI MDM: CPT | Mod: 25

## 2024-08-10 PROCEDURE — 63600175 PHARM REV CODE 636 W HCPCS: Performed by: NURSE PRACTITIONER

## 2024-08-10 PROCEDURE — 96375 TX/PRO/DX INJ NEW DRUG ADDON: CPT

## 2024-08-10 PROCEDURE — 80053 COMPREHEN METABOLIC PANEL: CPT | Performed by: EMERGENCY MEDICINE

## 2024-08-10 PROCEDURE — 82565 ASSAY OF CREATININE: CPT

## 2024-08-10 PROCEDURE — 81003 URINALYSIS AUTO W/O SCOPE: CPT | Performed by: EMERGENCY MEDICINE

## 2024-08-10 PROCEDURE — 85025 COMPLETE CBC W/AUTO DIFF WBC: CPT | Performed by: EMERGENCY MEDICINE

## 2024-08-10 RX ORDER — HYDROMORPHONE HYDROCHLORIDE 1 MG/ML
0.5 INJECTION, SOLUTION INTRAMUSCULAR; INTRAVENOUS; SUBCUTANEOUS
Status: COMPLETED | OUTPATIENT
Start: 2024-08-10 | End: 2024-08-10

## 2024-08-10 RX ORDER — ONDANSETRON HYDROCHLORIDE 2 MG/ML
4 INJECTION, SOLUTION INTRAVENOUS
Status: COMPLETED | OUTPATIENT
Start: 2024-08-10 | End: 2024-08-10

## 2024-08-10 RX ORDER — CIPROFLOXACIN 500 MG/1
500 TABLET ORAL 2 TIMES DAILY
Qty: 20 TABLET | Refills: 0 | Status: SHIPPED | OUTPATIENT
Start: 2024-08-10 | End: 2024-08-20

## 2024-08-10 RX ORDER — METRONIDAZOLE 500 MG/1
500 TABLET ORAL 3 TIMES DAILY
Qty: 21 TABLET | Refills: 0 | Status: SHIPPED | OUTPATIENT
Start: 2024-08-10 | End: 2024-08-17

## 2024-08-10 RX ADMIN — ONDANSETRON 4 MG: 2 INJECTION INTRAMUSCULAR; INTRAVENOUS at 04:08

## 2024-08-10 RX ADMIN — IOHEXOL 100 ML: 350 INJECTION, SOLUTION INTRAVENOUS at 03:08

## 2024-08-10 RX ADMIN — HYDROMORPHONE HYDROCHLORIDE 0.5 MG: 0.5 INJECTION, SOLUTION INTRAMUSCULAR; INTRAVENOUS; SUBCUTANEOUS at 04:08

## 2024-08-10 NOTE — Clinical Note
"Fariba"El Gamez was seen and treated in our emergency department on 8/10/2024.  She may return to work on 08/13/2024.       If you have any questions or concerns, please don't hesitate to call.      Pratima Barros NP"

## 2024-08-10 NOTE — ED PROVIDER NOTES
Encounter Date: 8/10/2024       History     Chief Complaint   Patient presents with    Abdominal Pain     Hx of pancreatitis. Feels difference. Heaviness in abdomen with cramping    Emesis     X1 last night     Presents with complaint of right upper quadrant pain with vomiting x1 yesterday.  Onset of the pain last night.  Patient has a history of pancreatitis denies fever diarrhea.  Patient continues to drink alcohol.  She denies fever.      Review of patient's allergies indicates:   Allergen Reactions    Opioids - morphine analogues Itching     Past Medical History:   Diagnosis Date    Alcohol induced acute pancreatitis     Alcoholic cirrhosis of liver without ascites     Alcoholic liver disease     Gastric ulcer, unspecified as acute or chronic, without hemorrhage or perforation     Hyperlipidemia     Hypertension     Left-sided low back pain with left-sided sciatica     Leucopenia 9/12/2018    Normochromic normocytic anemia 9/12/2018    Pancreatic cancer 9/12/2018    Pancreatic mass 9/12/2018    Pancreatitis     Stomach ulcer     Thrombocytopenia 9/12/2018    Unspecified cirrhosis of liver      Past Surgical History:   Procedure Laterality Date    COLONOSCOPY N/A 8/19/2022    Procedure: COLONOSCOPY;  Surgeon: Simon Corrigan III, MD;  Location: [facility];  Service: Endoscopy;  Laterality: N/A;    ENDOSCOPIC ULTRASOUND OF UPPER GASTROINTESTINAL TRACT N/A 8/19/2022    Procedure: ULTRASOUND, UPPER GI TRACT, ENDOSCOPIC;  Surgeon: Simon Corrigan III, MD;  Location: [facility];  Service: Endoscopy;  Laterality: N/A;    ESOPHAGOGASTRODUODENOSCOPY      PARTIAL HYSTERECTOMY       Family History   Problem Relation Name Age of Onset    Hypertension Mother      Kidney failure Mother      Hypertension Father      Heart attack Father       Social History     Tobacco Use    Smoking status: Never    Smokeless tobacco: Never   Substance Use Topics    Alcohol use: Yes     Alcohol/week: 2.0 standard drinks of alcohol      Types: 2 Cans of beer per week     Comment: socially    Drug use: Yes     Frequency: 7.0 times per week     Types: Marijuana     Comment: daily     Review of Systems   Constitutional:  Negative for fever.   Respiratory:  Negative for cough, shortness of breath and wheezing.    Cardiovascular:  Negative for chest pain, palpitations and leg swelling.   Gastrointestinal:  Positive for abdominal pain and nausea. Negative for diarrhea and vomiting.   Genitourinary:  Negative for decreased urine volume, difficulty urinating, dysuria, flank pain, frequency, pelvic pain, vaginal bleeding and vaginal discharge.   Musculoskeletal:  Negative for back pain and gait problem.   Skin:  Negative for rash.   Neurological:  Negative for dizziness and weakness.       Physical Exam     Initial Vitals [08/10/24 1338]   BP Pulse Resp Temp SpO2   121/83 69 18 98.6 °F (37 °C) 100 %      MAP       --         Physical Exam    Constitutional: She appears well-developed and well-nourished.   HENT:   Head: Normocephalic.   Mouth/Throat: Oropharynx is clear and moist.   Eyes: Conjunctivae are normal.   Neck: Neck supple.   Normal range of motion.  Cardiovascular:  Normal rate, regular rhythm and normal heart sounds.           Pulmonary/Chest: Breath sounds normal. No respiratory distress. She has no wheezes. She has no rhonchi.   Abdominal: Abdomen is soft. Bowel sounds are normal. She exhibits no distension. There is abdominal tenderness.   Right upper quadrant pain with palpation.  Abdomen is soft nondistended.  Bowel sounds are positive. There is no rebound and no guarding.   Musculoskeletal:         General: Normal range of motion.      Cervical back: Normal range of motion and neck supple.      Comments: Patient was ambulatory per self her gait is steady.     Neurological: She is alert and oriented to person, place, and time. No sensory deficit. GCS score is 15. GCS eye subscore is 4. GCS verbal subscore is 5. GCS motor subscore is 6.    Skin: Skin is warm and dry. Capillary refill takes less than 2 seconds.   Psychiatric: She has a normal mood and affect. Thought content normal.         ED Course   Procedures  Labs Reviewed   CBC W/ AUTO DIFFERENTIAL - Abnormal       Result Value    WBC 5.09      RBC 3.98 (*)     Hemoglobin 14.0      Hematocrit 39.1      MCV 98      MCH 35.2 (*)     MCHC 35.8      RDW 13.3      Platelets 161      MPV 9.0 (*)     Immature Granulocytes 0.2      Gran # (ANC) 2.0      Immature Grans (Abs) 0.01      Lymph # 2.2      Mono # 0.7      Eos # 0.1      Baso # 0.05      nRBC 0      Gran % 38.9      Lymph % 43.4      Mono % 14.5      Eosinophil % 2.0      Basophil % 1.0      Differential Method Automated     COMPREHENSIVE METABOLIC PANEL - Abnormal    Sodium 138      Potassium 3.7      Chloride 105      CO2 24      Glucose 93      BUN 6      Creatinine 0.6      Calcium 9.2      Total Protein 7.0      Albumin 4.2      Total Bilirubin 0.6      Alkaline Phosphatase 84      AST 62 (*)     ALT 33      eGFR >60.0      Anion Gap 9     LIPASE - Abnormal    Lipase 3 (*)    URINALYSIS, REFLEX TO URINE CULTURE - Abnormal    Specimen UA Urine, Clean Catch      Color, UA Colorless (*)     Appearance, UA Clear      pH, UA 5.0      Specific Gravity, UA <1.005 (*)     Protein, UA Negative      Glucose, UA Negative      Ketones, UA Negative      Bilirubin (UA) Negative      Occult Blood UA Negative      Nitrite, UA Negative      Urobilinogen, UA Negative      Leukocytes, UA Negative      Narrative:     Specimen Source->Urine   ISTAT CREATININE    POC Creatinine 0.7      Sample VENOUS            Imaging Results              CT Abdomen Pelvis With IV Contrast NO Oral Contrast (Final result)  Result time 08/10/24 16:34:33      Final result by Sujit Kwan MD (08/10/24 16:34:33)                   Impression:      Mildly prominent fluid-filled loops of small bowel without discrete transition point which may reflect a nonspecific enteritis.   Clinical correlation advised.    Findings in keeping with sequelae of chronic pancreatitis.    Cholelithiasis.  No definite CT evidence of acute cholecystitis.    Circumferential bladder wall thickening favored to relate to nondistention, although correlation with urinalysis to exclude cystitis advised.    Additional findings as above.      Electronically signed by: Sujit Kwan MD  Date:    08/10/2024  Time:    16:34               Narrative:    EXAMINATION:  CT ABDOMEN PELVIS WITH IV CONTRAST    CLINICAL HISTORY:  Abdominal pain, acute, nonlocalized;History of pancreatitis;    TECHNIQUE:  Low dose axial images, sagittal and coronal reformations were obtained from the lung bases to the pubic symphysis following the IV administration of 100 mL of Omnipaque 350 .  Oral contrast was not given.    COMPARISON:  CT 10/14/2020, ultrasound 08/26/2022    FINDINGS:  The lung bases are unremarkable.  There is no pleural fluid present.  The visualized portions of the heart appear normal.    Liver demonstrates a slightly nodular contour which could reflect underlying cirrhosis.  No focal hepatic abnormality identified.  There are multiple calculi within the gallbladder lumen.  No definite CT evidence to suggest acute cholecystitis.  There is no intra-or extrahepatic biliary ductal dilatation.    The stomach is mildly distended presumably with recently ingested oral contents.  The spleen and adrenal glands demonstrate no acute abnormality.  Pancreas is markedly atrophic with coarse calcification and diffuse dilatation of the main pancreatic duct suggesting sequelae of chronic pancreatitis.  No significant inflammatory change about the residual pancreatic parenchyma to suggest acute pancreatitis.    The kidneys are normal in size and location and enhance symmetrically.  There is a right-sided extrarenal pelvis.  There is a subcentimeter right renal hypodensity, too small to accurately characterize.  No significant  hydronephrosis.  Urinary bladder is decompressed with circumferential wall thickening.  Uterus is surgically absent.    The abdominal aorta is normal in course and caliber with atherosclerotic calcification along its course.    There are mildly prominent fluid-filled loops of small bowel throughout the abdomen and pelvis without discrete transition point with a small volume of liquid stool in the cecum.  Findings may reflect a nonspecific enteritis.  There is scattered stool throughout the left hemicolon.  No CT evidence to suggest acute appendicitis.  There is no ascites, free fluid, or intraperitoneal free air. There are scattered shotty small mesenteric and retroperitoneal lymph nodes.    The visualized osseous structures appear intact.  Nonspecific metallic density noted within the left gluteal subcutaneous soft tissues.                                       Medications   iohexoL (OMNIPAQUE 350) injection 100 mL (100 mLs Intravenous Given 8/10/24 1556)   HYDROmorphone injection 0.5 mg (0.5 mg Intravenous Given 8/10/24 1642)   ondansetron injection 4 mg (4 mg Intravenous Given 8/10/24 1642)     Medical Decision Making  Presents with complaint of right upper quadrant pain.  Onset last night.  Patient reports she vomited once last night.  Denies fever or diarrhea.  Patient has a history of pancreatitis.  She continues to drink alcohol.    Amount and/or Complexity of Data Reviewed  Labs:      Details: Labs are within normal limits for this patient.  Radiology: ordered.     Details: CT of the abdomen reports chronic pancreatitis.  No signs of acute pancreatitis.  Patient has enteritis.  Discussion of management or test interpretation with external provider(s): Patient was given Dilaudid 1 mg with 4 mg of Zofran IV.  This has greatly reduced her pain.  She has been given her results.  She has been instructed to follow up with a GI doctor.  I have given her a ambulatory referral to GI along with an ambulatory referral  to [facility] she has no primary care doctor.  She was given a prescription for Cipro as well as Flagyl for home use.  She was given instructions as to how to take this medication.  She was also given return precautions.  At discharge is patient appeared to be in no acute distress.    Risk  Prescription drug management.                                      Clinical Impression:  Final diagnoses:  [K52.9] Enteritis (Primary)          ED Disposition Condition    Discharge Stable          ED Prescriptions       Medication Sig Dispense Start Date End Date Auth. Provider    metroNIDAZOLE (FLAGYL) 500 MG tablet Take 1 tablet (500 mg total) by mouth 3 (three) times daily. for 7 days 21 tablet 8/10/2024 8/17/2024 Pratima Barros NP    ciprofloxacin HCl (CIPRO) 500 MG tablet Take 1 tablet (500 mg total) by mouth 2 (two) times daily. for 10 days 20 tablet 8/10/2024 8/20/2024 Pratima Barros NP          Follow-up Information    None          Pratima Barros NP  08/10/24 7028       Pratima Barros NP  08/10/24 5077     Patient

## 2024-08-10 NOTE — DISCHARGE INSTRUCTIONS
Make a follow-up appointment with your gastroenterologist Dr. Forrest.  Taking medication as prescribed return to the ED for any worsening of symptoms or any other concerns.  The nurse practitioner in [place] is name is SELAM Simon  her office at [address] [place] [address]  [phone].  Make a follow-up appoint with your primary care doctor as well as your GI doctor.  Return to the ED for any worsening of symptoms or any other concerns.

## 2024-08-13 ENCOUNTER — PATIENT OUTREACH (OUTPATIENT)
Dept: EMERGENCY MEDICINE | Facility: HOSPITAL | Age: 59
End: 2024-08-13

## 2024-10-28 ENCOUNTER — HOSPITAL ENCOUNTER (EMERGENCY)
Facility: HOSPITAL | Age: 59
Discharge: HOME | End: 2024-10-29
Attending: EMERGENCY MEDICINE

## 2024-10-28 VITALS
OXYGEN SATURATION: 98 % | RESPIRATION RATE: 18 BRPM | HEART RATE: 62 BPM | TEMPERATURE: 98 F | SYSTOLIC BLOOD PRESSURE: 135 MMHG | HEIGHT: 64 IN | WEIGHT: 110 LBS | BODY MASS INDEX: 18.78 KG/M2 | DIASTOLIC BLOOD PRESSURE: 80 MMHG

## 2024-10-28 DIAGNOSIS — M25.529 ELBOW PAIN, UNSPECIFIED LATERALITY: Primary | ICD-10-CM

## 2024-10-28 DIAGNOSIS — R52 PAIN: ICD-10-CM

## 2024-10-28 PROCEDURE — 99283 EMERGENCY DEPT VISIT LOW MDM: CPT | Mod: 25

## 2024-10-29 RX ORDER — NAPROXEN 500 MG/1
500 TABLET ORAL 2 TIMES DAILY
Qty: 20 TABLET | Refills: 0 | Status: SHIPPED | OUTPATIENT
Start: 2024-10-29 | End: 2024-11-08

## 2024-12-17 ENCOUNTER — HOSPITAL ENCOUNTER (EMERGENCY)
Facility: HOSPITAL | Age: 59
Discharge: HOME | End: 2024-12-17
Attending: EMERGENCY MEDICINE

## 2024-12-17 VITALS
WEIGHT: 110.69 LBS | OXYGEN SATURATION: 98 % | DIASTOLIC BLOOD PRESSURE: 80 MMHG | HEART RATE: 70 BPM | RESPIRATION RATE: 16 BRPM | SYSTOLIC BLOOD PRESSURE: 142 MMHG | BODY MASS INDEX: 18.9 KG/M2 | HEIGHT: 64 IN | TEMPERATURE: 99 F

## 2024-12-17 DIAGNOSIS — R55 NEAR SYNCOPE: Primary | ICD-10-CM

## 2024-12-17 LAB
ALBUMIN SERPL BCP-MCNC: 4.5 G/DL (ref 3.5–5.2)
ALP SERPL-CCNC: 78 U/L (ref 55–135)
ALT SERPL W/O P-5'-P-CCNC: 18 U/L (ref 10–44)
ANION GAP SERPL CALC-SCNC: 7 MMOL/L (ref 8–16)
AST SERPL-CCNC: 28 U/L (ref 10–40)
BASOPHILS # BLD AUTO: 0.07 K/UL (ref 0–0.2)
BASOPHILS NFR BLD: 1 % (ref 0–1.9)
BILIRUB SERPL-MCNC: 0.6 MG/DL (ref 0.1–1)
BILIRUB UR QL STRIP: NEGATIVE
BNP SERPL-MCNC: 68 PG/ML (ref 0–99)
BUN SERPL-MCNC: 6 MG/DL (ref 6–20)
CALCIUM SERPL-MCNC: 8.8 MG/DL (ref 8.7–10.5)
CHLORIDE SERPL-SCNC: 105 MMOL/L (ref 95–110)
CLARITY UR: CLEAR
CO2 SERPL-SCNC: 23 MMOL/L (ref 23–29)
COLOR UR: COLORLESS
CREAT SERPL-MCNC: 0.6 MG/DL (ref 0.5–1.4)
DIFFERENTIAL METHOD BLD: ABNORMAL
EOSINOPHIL # BLD AUTO: 0.5 K/UL (ref 0–0.5)
EOSINOPHIL NFR BLD: 7.6 % (ref 0–8)
ERYTHROCYTE [DISTWIDTH] IN BLOOD BY AUTOMATED COUNT: 13.4 % (ref 11.5–14.5)
EST. GFR  (NO RACE VARIABLE): >60 ML/MIN/1.73 M^2
GLUCOSE SERPL-MCNC: 74 MG/DL (ref 70–110)
GLUCOSE UR QL STRIP: NEGATIVE
HCT VFR BLD AUTO: 40.4 % (ref 37–48.5)
HGB BLD-MCNC: 14.1 G/DL (ref 12–16)
HGB UR QL STRIP: NEGATIVE
IMM GRANULOCYTES # BLD AUTO: 0.02 K/UL (ref 0–0.04)
IMM GRANULOCYTES NFR BLD AUTO: 0.3 % (ref 0–0.5)
KETONES UR QL STRIP: NEGATIVE
LEUKOCYTE ESTERASE UR QL STRIP: NEGATIVE
LYMPHOCYTES # BLD AUTO: 2.9 K/UL (ref 1–4.8)
LYMPHOCYTES NFR BLD: 42.8 % (ref 18–48)
MAGNESIUM SERPL-MCNC: 1.9 MG/DL (ref 1.6–2.6)
MCH RBC QN AUTO: 31.7 PG (ref 27–31)
MCHC RBC AUTO-ENTMCNC: 34.9 G/DL (ref 32–36)
MCV RBC AUTO: 91 FL (ref 82–98)
MONOCYTES # BLD AUTO: 0.5 K/UL (ref 0.3–1)
MONOCYTES NFR BLD: 7.4 % (ref 4–15)
NEUTROPHILS # BLD AUTO: 2.8 K/UL (ref 1.8–7.7)
NEUTROPHILS NFR BLD: 40.9 % (ref 38–73)
NITRITE UR QL STRIP: NEGATIVE
NRBC BLD-RTO: 0 /100 WBC
OHS QRS DURATION: 82 MS
OHS QTC CALCULATION: 412 MS
PH UR STRIP: 5 [PH] (ref 5–8)
PLATELET # BLD AUTO: 180 K/UL (ref 150–450)
PMV BLD AUTO: 8.8 FL (ref 9.2–12.9)
POCT GLUCOSE: 116 MG/DL (ref 70–110)
POTASSIUM SERPL-SCNC: 4.1 MMOL/L (ref 3.5–5.1)
PROT SERPL-MCNC: 7.1 G/DL (ref 6–8.4)
PROT UR QL STRIP: NEGATIVE
RBC # BLD AUTO: 4.45 M/UL (ref 4–5.4)
SODIUM SERPL-SCNC: 135 MMOL/L (ref 136–145)
SP GR UR STRIP: <1.005 (ref 1–1.03)
TROPONIN I SERPL HS-MCNC: 4 PG/ML (ref 0–14.9)
URN SPEC COLLECT METH UR: ABNORMAL
UROBILINOGEN UR STRIP-ACNC: NEGATIVE EU/DL
WBC # BLD AUTO: 6.87 K/UL (ref 3.9–12.7)

## 2024-12-17 PROCEDURE — 81003 URINALYSIS AUTO W/O SCOPE: CPT | Performed by: EMERGENCY MEDICINE

## 2024-12-17 PROCEDURE — 82962 GLUCOSE BLOOD TEST: CPT

## 2024-12-17 PROCEDURE — 84484 ASSAY OF TROPONIN QUANT: CPT | Performed by: EMERGENCY MEDICINE

## 2024-12-17 PROCEDURE — 83880 ASSAY OF NATRIURETIC PEPTIDE: CPT | Performed by: EMERGENCY MEDICINE

## 2024-12-17 PROCEDURE — 99285 EMERGENCY DEPT VISIT HI MDM: CPT | Mod: 25

## 2024-12-17 PROCEDURE — 80053 COMPREHEN METABOLIC PANEL: CPT | Performed by: EMERGENCY MEDICINE

## 2024-12-17 PROCEDURE — 85025 COMPLETE CBC W/AUTO DIFF WBC: CPT | Performed by: EMERGENCY MEDICINE

## 2024-12-17 PROCEDURE — 83735 ASSAY OF MAGNESIUM: CPT | Performed by: EMERGENCY MEDICINE

## 2024-12-17 NOTE — Clinical Note
"Fariba"El Gamez was seen and treated in our emergency department on 12/17/2024.  She may return to work on 12/20/2024.       If you have any questions or concerns, please don't hesitate to call.      ZAY MillerP RN    "

## 2024-12-17 NOTE — ED PROVIDER NOTES
Encounter Date: 12/17/2024       History     Chief Complaint   Patient presents with    Dizziness     Starting around 1300 today. Patient states she had a near syncopal episode while getting out of her car.      Patient presents complaining of lightheadedness and dizziness.  Patient having mild-to-moderate headache.  Patient states she had a near syncopal episode just prior to arrival.  Patient states she has had lightheadedness throughout the day.  She denies any unilateral numbness tingling or weakness.  No difficulty walking.  She has a history of alcohol-induced pancreatitis, gastric ulcer, pancreatic mass she denies any abdominal pain or discomfort.  She denies black stool.      Review of patient's allergies indicates:   Allergen Reactions    Opioids - morphine analogues Itching     Past Medical History:   Diagnosis Date    Alcohol induced acute pancreatitis     Alcoholic cirrhosis of liver without ascites     Alcoholic liver disease     Gastric ulcer, unspecified as acute or chronic, without hemorrhage or perforation     Hyperlipidemia     Hypertension     Left-sided low back pain with left-sided sciatica     Leucopenia 9/12/2018    Normochromic normocytic anemia 9/12/2018    Pancreatic cancer 9/12/2018    Pancreatic mass 9/12/2018    Pancreatitis     Stomach ulcer     Thrombocytopenia 9/12/2018    Unspecified cirrhosis of liver      Past Surgical History:   Procedure Laterality Date    COLONOSCOPY N/A 8/19/2022    Procedure: COLONOSCOPY;  Surgeon: Simon Corrigan III, MD;  Location: [facility];  Service: Endoscopy;  Laterality: N/A;    ENDOSCOPIC ULTRASOUND OF UPPER GASTROINTESTINAL TRACT N/A 8/19/2022    Procedure: ULTRASOUND, UPPER GI TRACT, ENDOSCOPIC;  Surgeon: Simon Corrigan III, MD;  Location: [facility];  Service: Endoscopy;  Laterality: N/A;    ESOPHAGOGASTRODUODENOSCOPY      PARTIAL HYSTERECTOMY       Family History   Problem Relation Name Age of Onset    Hypertension Mother      Kidney  failure Mother      Hypertension Father      Heart attack Father       Social History     Tobacco Use    Smoking status: Never    Smokeless tobacco: Never   Substance Use Topics    Alcohol use: Yes     Alcohol/week: 2.0 standard drinks of alcohol     Types: 2 Cans of beer per week     Comment: socially    Drug use: Yes     Frequency: 7.0 times per week     Types: Marijuana     Comment: daily     Review of Systems   All other systems reviewed and are negative.      Physical Exam     Initial Vitals [12/17/24 1437]   BP Pulse Resp Temp SpO2   (!) 148/84 64 18 98.4 °F (36.9 °C) 100 %      MAP       --         Physical Exam    Nursing note and vitals reviewed.  Constitutional: She appears well-developed and well-nourished.   Pleasant, polite   HENT:   Head: Normocephalic and atraumatic.   Eyes: EOM are normal.   Neck: Neck supple.   Normal range of motion.  Cardiovascular:  Intact distal pulses.           Pulmonary/Chest: Breath sounds normal. No respiratory distress.   Abdominal: Abdomen is soft. There is no abdominal tenderness. There is no rebound.   Musculoskeletal:      Cervical back: Normal range of motion and neck supple.     Neurological: She is alert and oriented to person, place, and time. She has normal strength.   Vision - Normal  Aphasia - Normal  Neglect - Normal  Pronator drift - Normal  Cerebellum - Normal  RUE strength - Normal  RLE strength - Normal  LUE strength - Normal  LLE strength - Normal   Skin: Skin is warm and dry. Capillary refill takes less than 2 seconds.   Psychiatric: She has a normal mood and affect. Her behavior is normal. Judgment and thought content normal.         ED Course   Procedures  Labs Reviewed   CBC W/ AUTO DIFFERENTIAL - Abnormal       Result Value    WBC 6.87      RBC 4.45      Hemoglobin 14.1      Hematocrit 40.4      MCV 91      MCH 31.7 (*)     MCHC 34.9      RDW 13.4      Platelets 180      MPV 8.8 (*)     Immature Granulocytes 0.3      Gran # (ANC) 2.8      Immature  Grans (Abs) 0.02      Lymph # 2.9      Mono # 0.5      Eos # 0.5      Baso # 0.07      nRBC 0      Gran % 40.9      Lymph % 42.8      Mono % 7.4      Eosinophil % 7.6      Basophil % 1.0      Differential Method Automated      Narrative:     Release to patient->Immediate   COMPREHENSIVE METABOLIC PANEL - Abnormal    Sodium 135 (*)     Potassium 4.1      Chloride 105      CO2 23      Glucose 74      BUN 6      Creatinine 0.6      Calcium 8.8      Total Protein 7.1      Albumin 4.5      Total Bilirubin 0.6      Alkaline Phosphatase 78      AST 28      ALT 18      eGFR >60.0      Anion Gap 7 (*)     Narrative:     Release to patient->Immediate   URINALYSIS, REFLEX TO URINE CULTURE - Abnormal    Specimen UA Urine, Clean Catch      Color, UA Colorless (*)     Appearance, UA Clear      pH, UA 5.0      Specific Gravity, UA <1.005 (*)     Protein, UA Negative      Glucose, UA Negative      Ketones, UA Negative      Bilirubin (UA) Negative      Occult Blood UA Negative      Nitrite, UA Negative      Urobilinogen, UA Negative      Leukocytes, UA Negative      Narrative:     Specimen Source->Urine   POCT GLUCOSE - Abnormal    POCT Glucose 116 (*)    MAGNESIUM    Magnesium 1.9      Narrative:     Release to patient->Immediate   TROPONIN I HIGH SENSITIVITY    Troponin I High Sensitivity 4.0      Narrative:     Release to patient->Immediate   B-TYPE NATRIURETIC PEPTIDE    BNP 68      Narrative:     Release to patient->Immediate   HEPATITIS C ANTIBODY   HIV 1 / 2 ANTIBODY   TROPONIN I HIGH SENSITIVITY        ECG Results              EKG 12-lead (In process)        Collection Time Result Time QRS Duration OHS QTC Calculation    12/17/24 14:59:05 12/17/24 15:20:31 82 412                     In process by Interface, Lab In [facility] (12/17/24 15:20:34)                   Narrative:    Test Reason : R07.9,    Vent. Rate :  57 BPM     Atrial Rate :  57 BPM     P-R Int : 172 ms          QRS Dur :  82 ms      QT Int : 424 ms       P-R-T  Axes :  80  79  72 degrees    QTcB Int : 412 ms    Sinus bradycardia  Septal infarct (cited on or before 12-Feb-2021)  T wave abnormality, consider anterior ischemia  Abnormal ECG  When compared with ECG of 11-Aug-2023 10:06,  No significant change was found    Referred By:            Confirmed By:                                   Imaging Results              X-Ray Chest PA And Lateral (Final result)  Result time 12/17/24 16:16:53   Procedure changed from X-Ray Chest AP Portable     Final result by Oseas Grissom MD (12/17/24 16:16:53)                   Impression:      1. No acute radiographic abnormalities.      Electronically signed by: Oseas Grissom  Date:    12/17/2024  Time:    16:16               Narrative:    EXAMINATION:  XR CHEST PA AND LATERAL    CLINICAL HISTORY:  pain;Chest Pain;    COMPARISON:  September 2023    FINDINGS:  PA and lateral views demonstrate no cardiac, pulmonary, or acute osseous abnormalities.                                       CT Head Without Contrast (Final result)  Result time 12/17/24 15:13:49      Final result by Pamela Burroughs MD (12/17/24 15:13:49)                   Impression:      1. Normal CT appearance of the brain.      Electronically signed by: Pamela Burroughs  Date:    12/17/2024  Time:    15:13               Narrative:    EXAMINATION:  CT HEAD WITHOUT CONTRAST    CLINICAL HISTORY:  Dizziness, persistent/recurrent, cardiac or vascular cause suspected;.    TECHNIQUE:  CMS MANDATED QUALITY DATA - CT RADIATION - 436    All CT scans at this facility utilize dose modulation, iterative reconstruction, and/or weight based dosing when appropriate to reduce radiation dose to as low as reasonably achievable.    Non infusion images were obtained from the skull base to the vertex.    COMPARISON:  None.    FINDINGS:  There is no evidence of intracranial mass, hemorrhage, or midline shift.  The ventricles and sulci are within normal limits.  There are no pathologic  extra-axial fluid collections.    There is no evidence of ischemic change or edema.  Cerebellum and brainstem are unremarkable.  The orbits are unremarkable.    The calvarium is intact.                                       Medications - No data to display  Medical Decision Making  Patient has a normal gait.    Considerations include symptomatic anemia, acute kidney injury, dehydration, electrolyte abnormalities, dysrhythmia    EKG is regular rate and rhythm with T-wave inversion in V2 and V3.  No ST elevation.  This is chronic no acute change compared to previous EKGs.    Blood work reviewed and within normal limits.  Patient with episode of near-syncope did not completely lose consciousness with headache.  Here in the ER is awake and alert in no distress.  Head CT within normal limits.  No active ongoing headache or dizziness.  Blood work reviewed and within normal limits.  Patient was advised close follow up with primary care doctor.  She was advised detailed return precautions and was discharged in stable condition.    Amount and/or Complexity of Data Reviewed  Labs: ordered. Decision-making details documented in ED Course.  Radiology: ordered.               ED Course as of 12/17/24 1857   Tue Dec 17, 2024   1728 Troponin I High Sensitivity: 4.0 [AP]   1728 BNP: 68 [AP]   1728 Magnesium : 1.9 [AP]   1728 WBC: 6.87 [AP]   1728 Hematocrit: 40.4 [AP]   1728 Sodium(!): 135 [AP]   1728 Potassium: 4.1 [AP]   1728 Chloride: 105 [AP]   1728 CO2: 23 [AP]   1728 BUN: 6 [AP]   1728 Creatinine: 0.6 [AP]   1728 PROTEIN TOTAL: 7.1 [AP]   1728 Albumin: 4.5 [AP]   1728 BILIRUBIN TOTAL: 0.6 [AP]   1728 AST: 28 [AP]   1728 ALT: 18 [AP]      ED Course User Index  [AP] Jozef Duong MD                           Clinical Impression:  Final diagnoses:  [R55] Near syncope (Primary)          ED Disposition Condition    Discharge Stable          ED Prescriptions    None       Follow-up Information    None          Jozef Duong  MD  12/17/24 0186

## 2024-12-18 ENCOUNTER — PATIENT OUTREACH (OUTPATIENT)
Dept: EMERGENCY MEDICINE | Facility: HOSPITAL | Age: 59
End: 2024-12-18

## 2025-01-13 ENCOUNTER — HOSPITAL ENCOUNTER (EMERGENCY)
Facility: HOSPITAL | Age: 60
Discharge: HOME | End: 2025-01-13
Attending: EMERGENCY MEDICINE
Payer: MEDICAID

## 2025-01-13 VITALS
SYSTOLIC BLOOD PRESSURE: 160 MMHG | RESPIRATION RATE: 19 BRPM | OXYGEN SATURATION: 100 % | DIASTOLIC BLOOD PRESSURE: 87 MMHG | TEMPERATURE: 98 F | HEART RATE: 88 BPM | BODY MASS INDEX: 17.93 KG/M2 | HEIGHT: 64 IN | WEIGHT: 105 LBS

## 2025-01-13 DIAGNOSIS — I10 HYPERTENSION, UNSPECIFIED TYPE: ICD-10-CM

## 2025-01-13 DIAGNOSIS — R51.9 ACUTE NONINTRACTABLE HEADACHE, UNSPECIFIED HEADACHE TYPE: Primary | ICD-10-CM

## 2025-01-13 PROCEDURE — 99284 EMERGENCY DEPT VISIT MOD MDM: CPT

## 2025-01-13 PROCEDURE — 25000003 PHARM REV CODE 250: Performed by: EMERGENCY MEDICINE

## 2025-01-13 RX ORDER — HYDRALAZINE HYDROCHLORIDE 50 MG/1
50 TABLET, FILM COATED ORAL EVERY 12 HOURS
Qty: 8 TABLET | Refills: 0 | Status: SHIPPED | OUTPATIENT
Start: 2025-01-13 | End: 2025-01-17

## 2025-01-13 RX ORDER — HYDRALAZINE HYDROCHLORIDE 25 MG/1
50 TABLET, FILM COATED ORAL
Status: COMPLETED | OUTPATIENT
Start: 2025-01-13 | End: 2025-01-13

## 2025-01-13 RX ORDER — LISINOPRIL 2.5 MG/1
10 TABLET ORAL
Status: COMPLETED | OUTPATIENT
Start: 2025-01-13 | End: 2025-01-13

## 2025-01-13 RX ORDER — AMLODIPINE BESYLATE 10 MG/1
10 TABLET ORAL DAILY
Qty: 4 TABLET | Refills: 0 | Status: SHIPPED | OUTPATIENT
Start: 2025-01-13 | End: 2025-01-17

## 2025-01-13 RX ORDER — AMLODIPINE BESYLATE 5 MG/1
10 TABLET ORAL
Status: COMPLETED | OUTPATIENT
Start: 2025-01-13 | End: 2025-01-13

## 2025-01-13 RX ADMIN — HYDRALAZINE HYDROCHLORIDE 50 MG: 25 TABLET ORAL at 01:01

## 2025-01-13 RX ADMIN — LISINOPRIL 10 MG: 2.5 TABLET ORAL at 01:01

## 2025-01-13 RX ADMIN — AMLODIPINE BESYLATE 10 MG: 5 TABLET ORAL at 01:01

## 2025-01-13 NOTE — ED PROVIDER NOTES
Chief complaint:  Headache (Headache x 3 days.  Has been out of BP medication for 2 wks.  Has an appointment with [facility] on Thursday.  )      HPI:  Fariba Gamez is a 59 y.o. female with hx htn on hydralazine, AUD, liver cirrhosis and prior pancreatitis, anemia, PUD presenting with elevated blood pressure.  Patient complains of a mild, frontal headache for the past three days intermittent in nature.  She did take acetaminophen earlier today for headache without resolution.  She has had similar headache in the past.  It is nonpositional.  She denies visual change, difficulty speaking, difficulty walking, vomiting, focal numbness or weakness, loss of consciousness.  There is no change in headache with change in position.  She has been out of multiple medications for some time and has upcoming PCP appointment later this week.  She requests refill of antihypertensive medications and reports recently having been on hydralazine, amlodipine, and possibly lisinopril.    ROS: As per HPI and below:  No chest pain, dyspnea, new swelling.    Review of patient's allergies indicates:   Allergen Reactions    Opioids - morphine analogues Itching       Discharge Medication List as of 1/13/2025  3:00 PM        START taking these medications    Details   !! hydrALAZINE (APRESOLINE) 50 MG tablet Take 1 tablet (50 mg total) by mouth every 12 (twelve) hours. for 4 days, Starting Mon 1/13/2025, Until Fri 1/17/2025, Normal       !! - Potential duplicate medications found. Please discuss with provider.        CONTINUE these medications which have CHANGED    Details   amLODIPine (NORVASC) 10 MG tablet Take 1 tablet (10 mg total) by mouth once daily. for 4 days, Starting Mon 1/13/2025, Until Fri 1/17/2025, Normal           CONTINUE these medications which have NOT CHANGED    Details   albuterol (PROVENTIL/VENTOLIN HFA) 90 mcg/actuation inhaler Inhale 1-2 puffs into the lungs every 4 (four) hours as needed for Wheezing. Rescue,  Starting Tue 8/8/2023, Until Wed 8/7/2024 at 2359, Print      albuterol-ipratropium (DUO-NEB) 2.5 mg-0.5 mg/3 mL nebulizer solution Take 3 mLs by nebulization every 6 (six) hours as needed for Wheezing. Rescue, Starting Fri 8/11/2023, Until Sat 8/10/2024 at 2359, Normal      aspirin (ECOTRIN) 81 MG EC tablet Take 81 mg by mouth once daily., Historical Med      atorvastatin (LIPITOR) 20 MG tablet TAKE 1 TABLET(20 MG) BY MOUTH DAILY, Normal      !! carvedilol (COREG) 3.125 MG tablet Take 3.125 mg by mouth Daily., Starting Sat 12/2/2017, Historical Med      !! carvedilol (COREG) 6.25 MG tablet TAKE 1 TABLET(6.25 MG) BY MOUTH DAILY, Normal      cetirizine (ZYRTEC) 10 MG tablet Take 10 mg by mouth once daily., Historical Med      CREON CpDR 1 capsule 3 (three) times daily with meals., Starting Thu 8/16/2018, Historical Med      cyclobenzaprine (FLEXERIL) 10 MG tablet Take 10 mg by mouth 3 (three) times daily., Starting Sat 5/27/2023, Historical Med      ergocalciferol (ERGOCALCIFEROL) 50,000 unit Cap ergocalciferol (vitamin D2) 1,250 mcg (50,000 unit) capsule, Historical Med      famotidine (PEPCID) 20 MG tablet Take 1 tablet (20 mg total) by mouth 2 (two) times daily., Starting Fri 12/9/2022, Until Sun 1/8/2023, Normal      fenofibrate (TRICOR) 48 MG tablet Take 1 tablet (48 mg total) by mouth Daily., Starting Tue 4/17/2018, Normal      !! hydrALAZINE (APRESOLINE) 50 MG tablet Take 50 mg by mouth every 12 (twelve) hours., Starting Sat 7/28/2018, Historical Med      !! hydrALAZINE (APRESOLINE) 50 MG tablet Take 50 mg by mouth., Historical Med      hydrocortisone 2.5 % ointment Apply topically 2 (two) times daily. for 10 days, Starting Sun 9/17/2023, Until Wed 9/27/2023, Normal      lisinopriL (PRINIVIL,ZESTRIL) 20 MG tablet Take 20 mg by mouth once daily., Historical Med      magnesium oxide (MAG-OX) 400 mg tablet Take 1 tablet by mouth once daily., Starting Sun 3/4/2018, Historical Med      omeprazole (PRILOSEC) 40 MG  capsule Take 40 mg by mouth every morning., Historical Med      pantoprazole (PROTONIX) 40 MG tablet Take 40 mg by mouth once daily., Starting Sat 7/28/2018, Historical Med      potassium chloride (MICRO-K) 10 MEQ CpSR Take 10 mEq by mouth once., Historical Med      spironolactone (ALDACTONE) 25 MG tablet Take 25 mg by mouth once daily., Starting Tue 1/29/2019, Historical Med       !! - Potential duplicate medications found. Please discuss with provider.          PMH:  As per HPI and below:  Past Medical History:   Diagnosis Date    Alcohol induced acute pancreatitis     Alcoholic cirrhosis of liver without ascites     Alcoholic liver disease     Gastric ulcer, unspecified as acute or chronic, without hemorrhage or perforation     Hyperlipidemia     Hypertension     Left-sided low back pain with left-sided sciatica     Leucopenia 9/12/2018    Normochromic normocytic anemia 9/12/2018    Pancreatic cancer 9/12/2018    Pancreatic mass 9/12/2018    Pancreatitis     Stomach ulcer     Thrombocytopenia 9/12/2018    Unspecified cirrhosis of liver      Past Surgical History:   Procedure Laterality Date    COLONOSCOPY N/A 8/19/2022    Procedure: COLONOSCOPY;  Surgeon: Simon Corrigan III, MD;  Location: [facility];  Service: Endoscopy;  Laterality: N/A;    ENDOSCOPIC ULTRASOUND OF UPPER GASTROINTESTINAL TRACT N/A 8/19/2022    Procedure: ULTRASOUND, UPPER GI TRACT, ENDOSCOPIC;  Surgeon: Simon Corrigan III, MD;  Location: [facility];  Service: Endoscopy;  Laterality: N/A;    ESOPHAGOGASTRODUODENOSCOPY      PARTIAL HYSTERECTOMY         Social History     Socioeconomic History    Marital status: Legally    Tobacco Use    Smoking status: Never    Smokeless tobacco: Never   Substance and Sexual Activity    Alcohol use: Yes     Alcohol/week: 2.0 standard drinks of alcohol     Types: 2 Cans of beer per week     Comment: socially    Drug use: Yes     Frequency: 7.0 times per week     Types: Marijuana     Comment: daily      Social Drivers of Health     Transportation Needs: Unknown (3/4/2021)    [facility]    PRAPARE - Transportation     Lack of Transportation (Medical): Patient declined     Lack of Transportation (Non-Medical): Patient declined       Family History   Problem Relation Name Age of Onset    Hypertension Mother      Kidney failure Mother      Hypertension Father      Heart attack Father         Physical Exam:    Vitals:    01/13/25 1514   BP: (!) 160/87   Pulse: 88   Resp: 19   Temp: 98 °F (36.7 °C)     GENERAL:  No apparent distress.  Alert.    HEENT:  Moist mucous membranes.  Normocephalic and atraumatic.    NECK:  No swelling.  Midline trachea.  Supple.  CARDIOVASCULAR:  Regular rate and rhythm.  2+ radial pulses.    PULMONARY:  Lungs clear to auscultation bilaterally.  No wheezes, rales, or rhonci.    ABDOMEN:  Non-tender and non-distended.    EXTREMITIES:  Warm and well perfused.  Brisk capillary refill.  No edema.    NEUROLOGICAL:  Normal mental status.  Appropriate and conversant.  Normal gait.  Cranial nerves 3-12 intact.  5/5 strength and equal sensation bilaterally.  SKIN:  No rashes or ecchymoses.    BACK:  Atraumatic.  No CVA tenderness to palpation.      Labs Reviewed - No data to display    Discharge Medication List as of 1/13/2025  3:00 PM        START taking these medications    Details   !! hydrALAZINE (APRESOLINE) 50 MG tablet Take 1 tablet (50 mg total) by mouth every 12 (twelve) hours. for 4 days, Starting Mon 1/13/2025, Until Fri 1/17/2025, Normal       !! - Potential duplicate medications found. Please discuss with provider.        CONTINUE these medications which have CHANGED    Details   amLODIPine (NORVASC) 10 MG tablet Take 1 tablet (10 mg total) by mouth once daily. for 4 days, Starting Mon 1/13/2025, Until Fri 1/17/2025, Normal           CONTINUE these medications which have NOT CHANGED    Details   albuterol (PROVENTIL/VENTOLIN HFA) 90 mcg/actuation inhaler  Inhale 1-2 puffs into the lungs every 4 (four) hours as needed for Wheezing. Rescue, Starting Tue 8/8/2023, Until Wed 8/7/2024 at 2359, Print      albuterol-ipratropium (DUO-NEB) 2.5 mg-0.5 mg/3 mL nebulizer solution Take 3 mLs by nebulization every 6 (six) hours as needed for Wheezing. Rescue, Starting Fri 8/11/2023, Until Sat 8/10/2024 at 2359, Normal      aspirin (ECOTRIN) 81 MG EC tablet Take 81 mg by mouth once daily., Historical Med      atorvastatin (LIPITOR) 20 MG tablet TAKE 1 TABLET(20 MG) BY MOUTH DAILY, Normal      !! carvedilol (COREG) 3.125 MG tablet Take 3.125 mg by mouth Daily., Starting Sat 12/2/2017, Historical Med      !! carvedilol (COREG) 6.25 MG tablet TAKE 1 TABLET(6.25 MG) BY MOUTH DAILY, Normal      cetirizine (ZYRTEC) 10 MG tablet Take 10 mg by mouth once daily., Historical Med      CREON CpDR 1 capsule 3 (three) times daily with meals., Starting Thu 8/16/2018, Historical Med      cyclobenzaprine (FLEXERIL) 10 MG tablet Take 10 mg by mouth 3 (three) times daily., Starting Sat 5/27/2023, Historical Med      ergocalciferol (ERGOCALCIFEROL) 50,000 unit Cap ergocalciferol (vitamin D2) 1,250 mcg (50,000 unit) capsule, Historical Med      famotidine (PEPCID) 20 MG tablet Take 1 tablet (20 mg total) by mouth 2 (two) times daily., Starting Fri 12/9/2022, Until Sun 1/8/2023, Normal      fenofibrate (TRICOR) 48 MG tablet Take 1 tablet (48 mg total) by mouth Daily., Starting Tue 4/17/2018, Normal      !! hydrALAZINE (APRESOLINE) 50 MG tablet Take 50 mg by mouth every 12 (twelve) hours., Starting Sat 7/28/2018, Historical Med      !! hydrALAZINE (APRESOLINE) 50 MG tablet Take 50 mg by mouth., Historical Med      hydrocortisone 2.5 % ointment Apply topically 2 (two) times daily. for 10 days, Starting Sun 9/17/2023, Until Wed 9/27/2023, Normal      lisinopriL (PRINIVIL,ZESTRIL) 20 MG tablet Take 20 mg by mouth once daily., Historical Med      magnesium oxide (MAG-OX) 400 mg tablet Take 1 tablet by mouth  once daily., Starting Sun 3/4/2018, Historical Med      omeprazole (PRILOSEC) 40 MG capsule Take 40 mg by mouth every morning., Historical Med      pantoprazole (PROTONIX) 40 MG tablet Take 40 mg by mouth once daily., Starting Sat 7/28/2018, Historical Med      potassium chloride (MICRO-K) 10 MEQ CpSR Take 10 mEq by mouth once., Historical Med      spironolactone (ALDACTONE) 25 MG tablet Take 25 mg by mouth once daily., Starting Tue 1/29/2019, Historical Med       !! - Potential duplicate medications found. Please discuss with provider.          Orders Placed This Encounter   Procedures    Vital signs       Imaging Results    None         ED Course as of 01/13/25 2012 Mon Jan 13, 2025   1454 Patient feels markedly improved with no ongoing headache and is resting comfortably on reassessment. [MR]      ED Course User Index  [MR] Sujit Alexandra MD       MDM:    59 y.o. female with recent elevated blood pressure out of antihypertensive medications with headache.  There is noted hypertension here with patient's recent antihypertensives given and plan to reassess.  There are no other concerning features to headache and no concerning findings on examination.  She did take acetaminophen prior to arrival.  I will observe patient with antihypertensives and reassess with low suspicion for hypertensive emergency or other acute intracranial process such as CVA.  Patient given chronic antihypertensives and observed with marked improvement in blood pressure.  Minimal symptoms at this time and she prefers to follow up closely, which I feel is reasonable.  I do not think she requires parenteral blood pressure reduction or other emergent diagnostic imaging or treatment.  Short-term refill is on antihypertensives pending close outpatient follow up this week given.  Detailed return precautions reviewed.    Diagnoses:    1. Headache  2. Hypertension       Sujit Alexandra MD  01/13/25 2012

## 2025-01-13 NOTE — Clinical Note
"Fariba Vaughn" Franco was seen and treated in our emergency department on 1/13/2025.  She may return to work on 01/17/2025.       If you have any questions or concerns, please don't hesitate to call.      Yaniv FOWLER"

## 2025-05-07 DIAGNOSIS — F10.90 ALCOHOL USE: ICD-10-CM

## 2025-05-07 DIAGNOSIS — Z86.0100 PERSONAL HISTORY OF COLON POLYPS, UNSPECIFIED: ICD-10-CM

## 2025-05-07 DIAGNOSIS — K86.1 CHRONIC PANCREATITIS: Primary | ICD-10-CM

## 2025-05-07 DIAGNOSIS — Z72.89 OTHER PROBLEMS RELATED TO LIFESTYLE: ICD-10-CM

## 2025-05-12 ENCOUNTER — HOSPITAL ENCOUNTER (OUTPATIENT)
Dept: RADIOLOGY | Facility: HOSPITAL | Age: 60
Discharge: HOME | End: 2025-05-12
Attending: INTERNAL MEDICINE
Payer: MEDICARE

## 2025-05-12 DIAGNOSIS — K86.1 CHRONIC PANCREATITIS: ICD-10-CM

## 2025-05-12 DIAGNOSIS — Z86.0100 PERSONAL HISTORY OF COLON POLYPS, UNSPECIFIED: ICD-10-CM

## 2025-05-12 DIAGNOSIS — Z72.89 OTHER PROBLEMS RELATED TO LIFESTYLE: ICD-10-CM

## 2025-05-12 PROCEDURE — 74183 MRI ABD W/O CNTR FLWD CNTR: CPT | Mod: 26,,, | Performed by: RADIOLOGY

## 2025-05-12 PROCEDURE — A9585 GADOBUTROL INJECTION: HCPCS

## 2025-05-12 PROCEDURE — 25500020 PHARM REV CODE 255

## 2025-05-12 PROCEDURE — 74183 MRI ABD W/O CNTR FLWD CNTR: CPT | Mod: TC

## 2025-05-12 RX ORDER — GADOBUTROL 604.72 MG/ML
INJECTION INTRAVENOUS
Status: COMPLETED
Start: 2025-05-12 | End: 2025-05-12

## 2025-05-12 RX ADMIN — GADOBUTROL 4 ML: 604.72 INJECTION INTRAVENOUS at 07:05

## 2025-06-04 ENCOUNTER — HOSPITAL ENCOUNTER (EMERGENCY)
Facility: OTHER | Age: 60
Discharge: HOME | End: 2025-06-04
Attending: EMERGENCY MEDICINE
Payer: MEDICARE

## 2025-06-04 VITALS
DIASTOLIC BLOOD PRESSURE: 106 MMHG | HEIGHT: 64 IN | TEMPERATURE: 98 F | OXYGEN SATURATION: 100 % | SYSTOLIC BLOOD PRESSURE: 222 MMHG | BODY MASS INDEX: 19.63 KG/M2 | RESPIRATION RATE: 17 BRPM | HEART RATE: 64 BPM | WEIGHT: 115 LBS

## 2025-06-04 DIAGNOSIS — Z91.148 NONCOMPLIANCE WITH MEDICATION REGIMEN: ICD-10-CM

## 2025-06-04 DIAGNOSIS — I10 HYPERTENSION, UNSPECIFIED TYPE: Primary | ICD-10-CM

## 2025-06-04 LAB
HCV AB SERPL QL IA: NEGATIVE
HIV 1+2 AB+HIV1 P24 AG SERPL QL IA: NEGATIVE
HOLD SPECIMEN: NORMAL

## 2025-06-04 PROCEDURE — 25000003 PHARM REV CODE 250: Performed by: NURSE PRACTITIONER

## 2025-06-04 PROCEDURE — 87389 HIV-1 AG W/HIV-1&-2 AB AG IA: CPT

## 2025-06-04 PROCEDURE — 86803 HEPATITIS C AB TEST: CPT

## 2025-06-04 PROCEDURE — 99284 EMERGENCY DEPT VISIT MOD MDM: CPT | Mod: 25

## 2025-06-04 RX ORDER — AMLODIPINE BESYLATE 5 MG/1
10 TABLET ORAL
Status: COMPLETED | OUTPATIENT
Start: 2025-06-04 | End: 2025-06-04

## 2025-06-04 RX ORDER — LISINOPRIL 10 MG/1
20 TABLET ORAL
Status: COMPLETED | OUTPATIENT
Start: 2025-06-04 | End: 2025-06-04

## 2025-06-04 RX ADMIN — LISINOPRIL 20 MG: 10 TABLET ORAL at 01:06

## 2025-06-04 RX ADMIN — AMLODIPINE BESYLATE 10 MG: 5 TABLET ORAL at 01:06

## 2025-06-04 NOTE — ED PROVIDER NOTES
Encounter Date: 6/4/2025       History     Chief Complaint   Patient presents with    Hypertension     Sent from  for 211/90 BP. Pt asymptomatic. Hx of HTN, did not take meds today. Current /109    Shortness of Breath     Pt reporting SOB x 1 week. Hx of bronchitis and has inhaler     58 y/o female with ETOH pancreatitis, HTN, HLD, hx of pancreatic cancer, thrombocytopenia, and cirrhosis which presents to the ED with elevated blood pressure. Denies headaches, blurry vision, chest pain, or SOB. Denies any other symptoms.     The history is provided by the patient.     Review of patient's allergies indicates:   Allergen Reactions    Opioids - morphine analogues Itching     Past Medical History:   Diagnosis Date    Alcohol induced acute pancreatitis     Alcoholic cirrhosis of liver without ascites     Alcoholic liver disease     Gastric ulcer, unspecified as acute or chronic, without hemorrhage or perforation     Hyperlipidemia     Hypertension     Left-sided low back pain with left-sided sciatica     Leucopenia 9/12/2018    Normochromic normocytic anemia 9/12/2018    Pancreatic cancer 9/12/2018    Pancreatic mass 9/12/2018    Pancreatitis     Stomach ulcer     Thrombocytopenia 9/12/2018    Unspecified cirrhosis of liver      Past Surgical History:   Procedure Laterality Date    COLONOSCOPY N/A 8/19/2022    Procedure: COLONOSCOPY;  Surgeon: Simon Corrigan III, MD;  Location: [facility];  Service: Endoscopy;  Laterality: N/A;    ENDOSCOPIC ULTRASOUND OF UPPER GASTROINTESTINAL TRACT N/A 8/19/2022    Procedure: ULTRASOUND, UPPER GI TRACT, ENDOSCOPIC;  Surgeon: Simon Corrigan III, MD;  Location: [facility];  Service: Endoscopy;  Laterality: N/A;    ESOPHAGOGASTRODUODENOSCOPY      PARTIAL HYSTERECTOMY       Family History   Problem Relation Name Age of Onset    Hypertension Mother      Kidney failure Mother      Hypertension Father      Heart attack Father       Social History[1]  Review of Systems    Constitutional:  Negative for fever.   HENT:  Negative for sore throat.    Respiratory:  Negative for shortness of breath.    Cardiovascular:  Negative for chest pain.   Gastrointestinal:  Negative for nausea.   Genitourinary:  Negative for dysuria.   Musculoskeletal:  Negative for back pain.   Skin:  Negative for rash.   Neurological:  Negative for weakness.   Hematological:  Does not bruise/bleed easily.   All other systems reviewed and are negative.    Physical Exam     Initial Vitals [06/04/25 1232]   BP Pulse Resp Temp SpO2   (!) 229/108 70 19 98.2 °F (36.8 °C) 99 %      MAP       --         Physical Exam    Nursing note and vitals reviewed.  Constitutional: She appears well-developed and well-nourished.   HENT:   Head: Normocephalic and atraumatic.   Eyes: Conjunctivae and EOM are normal. Pupils are equal, round, and reactive to light.   Neck:   Normal range of motion.  Cardiovascular:  Normal rate, regular rhythm, normal heart sounds and intact distal pulses.     Exam reveals no gallop and no friction rub.       No murmur heard.  Pulmonary/Chest: Breath sounds normal. No respiratory distress. She has no wheezes. She has no rhonchi. She has no rales. She exhibits no tenderness.   Musculoskeletal:         General: No tenderness or edema. Normal range of motion.      Cervical back: Normal range of motion.     Neurological: She is alert and oriented to person, place, and time. She has normal strength. GCS score is 15. GCS eye subscore is 4. GCS verbal subscore is 5. GCS motor subscore is 6.   Skin: Skin is warm. Capillary refill takes less than 2 seconds. No rash noted. No erythema.   Psychiatric: She has a normal mood and affect.       ED Course   Procedures  Labs Reviewed   HEPATITIS C ANTIBODY - Normal       Result Value    Hep C Ab Interp Negative     HIV 1 / 2 ANTIBODY - Normal    HIV 1/2 Ag/Ab Negative     HEP C VIRUS HOLD SPECIMEN    Extra Tube Hold for add-ons.            Imaging Results               X-Ray Chest AP Portable (Final result)  Result time 06/04/25 13:28:11      Final result by Bry Zee MD (06/04/25 13:28:11)                   Impression:      1. No acute cardiopulmonary process.      Electronically signed by: Bry Zee MD  Date:    06/04/2025  Time:    13:28               Narrative:    EXAMINATION:  XR CHEST AP PORTABLE    CLINICAL HISTORY:  Chest Pain;    TECHNIQUE:  Single frontal view of the chest was performed.    COMPARISON:  12/17/2024    FINDINGS:  The cardiomediastinal silhouette is not enlarged.  There is no pleural effusion.  The trachea is midline.  The lungs are symmetrically expanded bilaterally without evidence of acute parenchymal process. No large focal consolidation seen.  There is no pneumothorax.  The osseous structures are remarkable for degenerative change..                                       Medications   amLODIPine tablet 10 mg (10 mg Oral Given 6/4/25 1322)   lisinopriL tablet 20 mg (20 mg Oral Given 6/4/25 1322)     Medical Decision Making  58 y/o female which presents to the ED with elevated blood pressure after she didn't take her morning medication. She was asymptomatic and given her meds. She was then discharged. Patient given strict return precautions and voiced understanding of all discharge instructions. Pt was stable at discharge.       Differential Diagnosis: hypertensive crisis, noncompliance with medication, HTN    Problems Addressed:  Hypertension, unspecified type: acute illness or injury  Noncompliance with medication regimen: self-limited or minor problem    Risk  Prescription drug management.               ED Course as of 06/05/25 1639   Wed Jun 04, 2025   1327 My EKG interpretation, sinus bradycardia, 59 beats per minute, normal axis, no ST segment changes, when compared to previous EKG December 17, 2024 relatively unchanged [TK]      ED Course User Index  [TK] Hany Wu MD                           Clinical Impression:  Final  diagnoses:  [I10] Hypertension, unspecified type (Primary)  [Z91.148] Noncompliance with medication regimen          ED Disposition Condition    Discharge Stable          ED Prescriptions    None       Follow-up Information       Follow up With Specialties Details Why Contact Hong    [address] [address]  [phone]      [facility] - Emergency Dept Emergency Medicine  If symptoms worsen [address] [facility] [address] [phone]                     [1]   Social History  Tobacco Use    Smoking status: Never    Smokeless tobacco: Never   Vaping Use    Vaping status: Never Used   Substance Use Topics    Alcohol use: Yes     Alcohol/week: 2.0 standard drinks of alcohol     Types: 2 Cans of beer per week     Comment: socially    Drug use: Yes     Frequency: 7.0 times per week     Types: Marijuana     Comment: daily        Marisol Cerda FNP  06/05/25 1640

## 2025-06-04 NOTE — ED TRIAGE NOTES
Pt arrived to the ED from her provider's office with complaints of hypertension.  Pt's blood pressure was elevated on arrival to the ED 200s/100s as she had not taken her daily medications. Pt denies any pain, shortness of breath, headache, nor any additional complaints at this time. Pt is AAOx4 and ambulatory.

## 2025-06-04 NOTE — DISCHARGE INSTRUCTIONS

## 2025-06-06 ENCOUNTER — RESULTS FOLLOW-UP (OUTPATIENT)
Dept: CARDIOLOGY | Facility: CLINIC | Age: 60
End: 2025-06-06

## 2025-06-06 ENCOUNTER — OFFICE VISIT (OUTPATIENT)
Dept: CARDIOLOGY | Facility: CLINIC | Age: 60
End: 2025-06-06
Payer: MEDICARE

## 2025-06-06 ENCOUNTER — APPOINTMENT (OUTPATIENT)
Dept: LAB | Facility: HOSPITAL | Age: 60
End: 2025-06-06
Attending: INTERNAL MEDICINE
Payer: MEDICARE

## 2025-06-06 VITALS
WEIGHT: 111.63 LBS | BODY MASS INDEX: 19.06 KG/M2 | HEART RATE: 63 BPM | HEIGHT: 64 IN | OXYGEN SATURATION: 96 % | SYSTOLIC BLOOD PRESSURE: 195 MMHG | DIASTOLIC BLOOD PRESSURE: 98 MMHG

## 2025-06-06 DIAGNOSIS — I11.0 HYPERTENSIVE HEART DISEASE WITH HEART FAILURE: ICD-10-CM

## 2025-06-06 DIAGNOSIS — R06.09 DOE (DYSPNEA ON EXERTION): ICD-10-CM

## 2025-06-06 DIAGNOSIS — F12.90 MARIJUANA SMOKER, CONTINUOUS: ICD-10-CM

## 2025-06-06 DIAGNOSIS — F10.10 ENGAGES IN BINGE CONSUMPTION OF ALCOHOL: ICD-10-CM

## 2025-06-06 DIAGNOSIS — I16.0 ASYMPTOMATIC HYPERTENSIVE URGENCY: Primary | ICD-10-CM

## 2025-06-06 DIAGNOSIS — F43.9 STRESS AT HOME: ICD-10-CM

## 2025-06-06 DIAGNOSIS — Z91.199 PERSONAL HISTORY OF NONCOMPLIANCE WITH MEDICAL TREATMENT, PRESENTING HAZARDS TO HEALTH: ICD-10-CM

## 2025-06-06 DIAGNOSIS — R09.89 LABILE HYPERTENSION: ICD-10-CM

## 2025-06-06 DIAGNOSIS — I10 PRIMARY HYPERTENSION: ICD-10-CM

## 2025-06-06 DIAGNOSIS — R94.31 ABNORMAL ECG: ICD-10-CM

## 2025-06-06 LAB
OHS QRS DURATION: 74 MS
OHS QTC CALCULATION: 424 MS

## 2025-06-06 PROCEDURE — 99999 PR PBB SHADOW E&M-EST. PATIENT-LVL IV: CPT | Mod: PBBFAC,,, | Performed by: INTERNAL MEDICINE

## 2025-06-06 RX ORDER — DICYCLOMINE HYDROCHLORIDE 20 MG/1
20 TABLET ORAL
COMMUNITY
End: 2025-06-06

## 2025-06-06 RX ORDER — CYPROHEPTADINE HYDROCHLORIDE 4 MG/1
1 TABLET ORAL 2 TIMES DAILY
COMMUNITY
End: 2025-06-06

## 2025-06-06 RX ORDER — CARVEDILOL 6.25 MG/1
6.25 TABLET ORAL 2 TIMES DAILY
COMMUNITY
End: 2025-06-06 | Stop reason: SDUPTHER

## 2025-06-06 RX ORDER — FAMOTIDINE 20 MG/1
20 TABLET, FILM COATED ORAL
COMMUNITY
End: 2025-06-06

## 2025-06-06 RX ORDER — ASPIRIN 81 MG/1
81 TABLET ORAL DAILY
COMMUNITY
Start: 2025-04-28 | End: 2025-06-06 | Stop reason: SDUPTHER

## 2025-06-06 RX ORDER — FENOFIBRATE 48 MG/1
1 TABLET, FILM COATED ORAL DAILY
COMMUNITY
End: 2025-06-06 | Stop reason: SDUPTHER

## 2025-06-06 RX ORDER — HYDROXYZINE HYDROCHLORIDE 25 MG/1
25 TABLET, FILM COATED ORAL
COMMUNITY
End: 2025-06-06

## 2025-06-06 RX ORDER — METHOCARBAMOL 500 MG/1
500 TABLET, FILM COATED ORAL
COMMUNITY
End: 2025-06-06

## 2025-06-06 RX ORDER — ATORVASTATIN CALCIUM 20 MG/1
40 TABLET, FILM COATED ORAL DAILY
COMMUNITY
End: 2025-06-06

## 2025-06-06 RX ORDER — FERROUS SULFATE 325(65) MG
325 TABLET ORAL
COMMUNITY
End: 2025-06-06

## 2025-06-06 RX ORDER — BETAMETHASONE VALERATE 1 MG/G
CREAM TOPICAL
COMMUNITY
End: 2025-06-06

## 2025-06-06 RX ORDER — HYDROCORTISONE 25 MG/G
1 OINTMENT TOPICAL 2 TIMES DAILY
COMMUNITY
End: 2025-06-06

## 2025-06-06 RX ORDER — BUDESONIDE AND FORMOTEROL FUMARATE DIHYDRATE 160; 4.5 UG/1; UG/1
2 AEROSOL RESPIRATORY (INHALATION) EVERY 12 HOURS
COMMUNITY

## 2025-06-06 RX ORDER — CETIRIZINE HYDROCHLORIDE 10 MG/1
1 TABLET ORAL DAILY
COMMUNITY
End: 2025-06-06 | Stop reason: SDUPTHER

## 2025-06-06 RX ORDER — TRIAMCINOLONE ACETONIDE 1 MG/G
CREAM TOPICAL
COMMUNITY
Start: 2025-01-16

## 2025-06-06 RX ORDER — HYDRALAZINE HYDROCHLORIDE 50 MG/1
1 TABLET, FILM COATED ORAL DAILY
COMMUNITY
End: 2025-06-06 | Stop reason: SDUPTHER

## 2025-06-06 RX ORDER — ALBUTEROL SULFATE 90 UG/1
2 INHALANT RESPIRATORY (INHALATION) EVERY 6 HOURS PRN
COMMUNITY

## 2025-06-06 RX ORDER — CLOBETASOL PROPIONATE 0.5 MG/G
CREAM TOPICAL
COMMUNITY
End: 2025-06-06

## 2025-06-06 RX ORDER — POTASSIUM CHLORIDE 1500 MG/1
20 TABLET, EXTENDED RELEASE ORAL DAILY
COMMUNITY

## 2025-06-06 RX ORDER — PANTOPRAZOLE SODIUM 40 MG/1
1 TABLET, DELAYED RELEASE ORAL DAILY
COMMUNITY
End: 2025-06-06 | Stop reason: SDUPTHER

## 2025-06-06 RX ORDER — DICLOFENAC SODIUM 50 MG/1
50 TABLET, DELAYED RELEASE ORAL
COMMUNITY
End: 2025-06-06

## 2025-06-06 RX ORDER — CYCLOBENZAPRINE HCL 10 MG
10 TABLET ORAL 3 TIMES DAILY
COMMUNITY
End: 2025-06-06 | Stop reason: SDUPTHER

## 2025-06-06 RX ORDER — PANCRELIPASE 36000; 180000; 114000 [USP'U]/1; [USP'U]/1; [USP'U]/1
CAPSULE, DELAYED RELEASE PELLETS ORAL
COMMUNITY

## 2025-06-06 RX ORDER — MEGESTROL ACETATE 20 MG/1
20 TABLET ORAL 2 TIMES DAILY
COMMUNITY
End: 2025-06-06

## 2025-06-06 RX ORDER — LISINOPRIL 20 MG/1
20 TABLET ORAL EVERY MORNING
COMMUNITY
End: 2025-06-06 | Stop reason: SDUPTHER

## 2025-06-06 RX ORDER — HYDROXYZINE PAMOATE 25 MG/1
1 CAPSULE ORAL NIGHTLY
COMMUNITY
End: 2025-06-06

## 2025-06-06 RX ORDER — AMLODIPINE BESYLATE 10 MG/1
10 TABLET ORAL DAILY
COMMUNITY
End: 2025-06-06 | Stop reason: SDUPTHER

## 2025-06-06 RX ORDER — SUCRALFATE 1 G/1
1 TABLET ORAL 2 TIMES DAILY
COMMUNITY
End: 2025-06-06

## 2025-06-09 DIAGNOSIS — R80.9 MICROALBUMINURIA: ICD-10-CM

## 2025-06-09 DIAGNOSIS — I11.0 HYPERTENSIVE HEART DISEASE WITH HEART FAILURE: Primary | ICD-10-CM

## 2025-06-09 LAB
OHS QRS DURATION: 80 MS
OHS QTC CALCULATION: 409 MS

## 2025-06-09 RX ORDER — LOSARTAN POTASSIUM 50 MG/1
50 TABLET ORAL DAILY
Qty: 90 TABLET | Refills: 3 | Status: SHIPPED | OUTPATIENT
Start: 2025-06-09 | End: 2026-06-09

## 2025-06-10 PROBLEM — E26.9 HIGH ALDOSTERONE TO RENIN RATIO: Status: ACTIVE | Noted: 2025-06-10

## 2025-06-19 ENCOUNTER — PATIENT MESSAGE (OUTPATIENT)
Dept: CARDIOLOGY | Facility: CLINIC | Age: 60
End: 2025-06-19
Payer: MEDICARE

## 2025-06-30 PROBLEM — F10.20 ANEMIA DUE TO ALCOHOLISM: Status: ACTIVE | Noted: 2025-06-30

## 2025-06-30 PROBLEM — D63.8 ANEMIA, CHRONIC DISEASE: Status: ACTIVE | Noted: 2025-06-30

## 2025-06-30 PROBLEM — D64.89 ANEMIA DUE TO ALCOHOLISM: Status: ACTIVE | Noted: 2025-06-30
